# Patient Record
Sex: MALE | Race: WHITE | Employment: OTHER | URBAN - METROPOLITAN AREA
[De-identification: names, ages, dates, MRNs, and addresses within clinical notes are randomized per-mention and may not be internally consistent; named-entity substitution may affect disease eponyms.]

---

## 2018-12-21 ENCOUNTER — APPOINTMENT (OUTPATIENT)
Dept: CT IMAGING | Facility: CLINIC | Age: 83
DRG: 057 | End: 2018-12-21
Attending: INTERNAL MEDICINE
Payer: MEDICARE

## 2018-12-21 ENCOUNTER — APPOINTMENT (OUTPATIENT)
Dept: GENERAL RADIOLOGY | Facility: CLINIC | Age: 83
DRG: 057 | End: 2018-12-21
Attending: EMERGENCY MEDICINE
Payer: MEDICARE

## 2018-12-21 ENCOUNTER — HOSPITAL ENCOUNTER (INPATIENT)
Facility: CLINIC | Age: 83
LOS: 7 days | Discharge: NURSING, REHAB OR LONG-TERM CARE | DRG: 057 | End: 2018-12-28
Attending: EMERGENCY MEDICINE | Admitting: INTERNAL MEDICINE
Payer: MEDICARE

## 2018-12-21 DIAGNOSIS — F03.918 SENILE DEMENTIA WITH BEHAVIORAL DISTURBANCE (H): Primary | ICD-10-CM

## 2018-12-21 DIAGNOSIS — I48.91 ATRIAL FIBRILLATION WITH RVR (H): ICD-10-CM

## 2018-12-21 LAB
ALBUMIN UR-MCNC: NEGATIVE MG/DL
ANION GAP SERPL CALCULATED.3IONS-SCNC: 8 MMOL/L (ref 3–14)
APPEARANCE UR: CLEAR
BASOPHILS # BLD AUTO: 0 10E9/L (ref 0–0.2)
BASOPHILS NFR BLD AUTO: 0.9 %
BILIRUB UR QL STRIP: NEGATIVE
BUN SERPL-MCNC: 15 MG/DL (ref 7–30)
CALCIUM SERPL-MCNC: 9.2 MG/DL (ref 8.5–10.1)
CHLORIDE SERPL-SCNC: 102 MMOL/L (ref 94–109)
CO2 SERPL-SCNC: 28 MMOL/L (ref 20–32)
COLOR UR AUTO: YELLOW
CREAT SERPL-MCNC: 1.14 MG/DL (ref 0.66–1.25)
DIFFERENTIAL METHOD BLD: ABNORMAL
EOSINOPHIL # BLD AUTO: 0.1 10E9/L (ref 0–0.7)
EOSINOPHIL NFR BLD AUTO: 1.6 %
ERYTHROCYTE [DISTWIDTH] IN BLOOD BY AUTOMATED COUNT: 13.4 % (ref 10–15)
GFR SERPL CREATININE-BSD FRML MDRD: 60 ML/MIN/{1.73_M2}
GLUCOSE SERPL-MCNC: 111 MG/DL (ref 70–99)
GLUCOSE UR STRIP-MCNC: NEGATIVE MG/DL
HCT VFR BLD AUTO: 40.1 % (ref 40–53)
HGB BLD-MCNC: 13.4 G/DL (ref 13.3–17.7)
HGB UR QL STRIP: NEGATIVE
IMM GRANULOCYTES # BLD: 0 10E9/L (ref 0–0.4)
IMM GRANULOCYTES NFR BLD: 0 %
INTERPRETATION ECG - MUSE: NORMAL
KETONES UR STRIP-MCNC: NEGATIVE MG/DL
LACTATE BLD-SCNC: 1.1 MMOL/L (ref 0.7–2)
LEUKOCYTE ESTERASE UR QL STRIP: NEGATIVE
LYMPHOCYTES # BLD AUTO: 1.1 10E9/L (ref 0.8–5.3)
LYMPHOCYTES NFR BLD AUTO: 25.1 %
MAGNESIUM SERPL-MCNC: 2.3 MG/DL (ref 1.6–2.3)
MCH RBC QN AUTO: 32.6 PG (ref 26.5–33)
MCHC RBC AUTO-ENTMCNC: 33.4 G/DL (ref 31.5–36.5)
MCV RBC AUTO: 98 FL (ref 78–100)
MONOCYTES # BLD AUTO: 0.4 10E9/L (ref 0–1.3)
MONOCYTES NFR BLD AUTO: 9.4 %
NEUTROPHILS # BLD AUTO: 2.8 10E9/L (ref 1.6–8.3)
NEUTROPHILS NFR BLD AUTO: 63 %
NITRATE UR QL: NEGATIVE
NRBC # BLD AUTO: 0 10*3/UL
NRBC BLD AUTO-RTO: 0 /100
NT-PROBNP SERPL-MCNC: 2345 PG/ML (ref 0–1800)
PH UR STRIP: 7 PH (ref 5–7)
PLATELET # BLD AUTO: 233 10E9/L (ref 150–450)
POTASSIUM SERPL-SCNC: 4 MMOL/L (ref 3.4–5.3)
RBC # BLD AUTO: 4.11 10E12/L (ref 4.4–5.9)
RBC #/AREA URNS AUTO: <1 /HPF (ref 0–2)
SODIUM SERPL-SCNC: 138 MMOL/L (ref 133–144)
SOURCE: NORMAL
SP GR UR STRIP: 1.01 (ref 1–1.03)
TROPONIN I SERPL-MCNC: 0.01 UG/L (ref 0–0.04)
TROPONIN I SERPL-MCNC: 0.02 UG/L (ref 0–0.04)
TROPONIN I SERPL-MCNC: 0.02 UG/L (ref 0–0.04)
TSH SERPL DL<=0.005 MIU/L-ACNC: 2.78 MU/L (ref 0.4–4)
UROBILINOGEN UR STRIP-MCNC: NORMAL MG/DL (ref 0–2)
WBC # BLD AUTO: 4.4 10E9/L (ref 4–11)
WBC #/AREA URNS AUTO: <1 /HPF (ref 0–5)

## 2018-12-21 PROCEDURE — 25000125 ZZHC RX 250: Performed by: EMERGENCY MEDICINE

## 2018-12-21 PROCEDURE — 25000132 ZZH RX MED GY IP 250 OP 250 PS 637: Mod: GY | Performed by: INTERNAL MEDICINE

## 2018-12-21 PROCEDURE — 70450 CT HEAD/BRAIN W/O DYE: CPT

## 2018-12-21 PROCEDURE — 25000128 H RX IP 250 OP 636: Performed by: EMERGENCY MEDICINE

## 2018-12-21 PROCEDURE — 85025 COMPLETE CBC W/AUTO DIFF WBC: CPT | Performed by: EMERGENCY MEDICINE

## 2018-12-21 PROCEDURE — 71046 X-RAY EXAM CHEST 2 VIEWS: CPT

## 2018-12-21 PROCEDURE — 83735 ASSAY OF MAGNESIUM: CPT | Performed by: PHYSICIAN ASSISTANT

## 2018-12-21 PROCEDURE — 21000001 ZZH R&B HEART CARE

## 2018-12-21 PROCEDURE — 84443 ASSAY THYROID STIM HORMONE: CPT | Performed by: PHYSICIAN ASSISTANT

## 2018-12-21 PROCEDURE — 83880 ASSAY OF NATRIURETIC PEPTIDE: CPT | Performed by: EMERGENCY MEDICINE

## 2018-12-21 PROCEDURE — 83605 ASSAY OF LACTIC ACID: CPT | Performed by: PHYSICIAN ASSISTANT

## 2018-12-21 PROCEDURE — 80048 BASIC METABOLIC PNL TOTAL CA: CPT | Performed by: EMERGENCY MEDICINE

## 2018-12-21 PROCEDURE — A9270 NON-COVERED ITEM OR SERVICE: HCPCS | Mod: GY | Performed by: INTERNAL MEDICINE

## 2018-12-21 PROCEDURE — 99285 EMERGENCY DEPT VISIT HI MDM: CPT | Mod: 25

## 2018-12-21 PROCEDURE — 93005 ELECTROCARDIOGRAM TRACING: CPT

## 2018-12-21 PROCEDURE — 84484 ASSAY OF TROPONIN QUANT: CPT | Performed by: PHYSICIAN ASSISTANT

## 2018-12-21 PROCEDURE — 96375 TX/PRO/DX INJ NEW DRUG ADDON: CPT

## 2018-12-21 PROCEDURE — 84484 ASSAY OF TROPONIN QUANT: CPT | Performed by: EMERGENCY MEDICINE

## 2018-12-21 PROCEDURE — 36415 COLL VENOUS BLD VENIPUNCTURE: CPT | Performed by: PHYSICIAN ASSISTANT

## 2018-12-21 PROCEDURE — 96374 THER/PROPH/DIAG INJ IV PUSH: CPT

## 2018-12-21 PROCEDURE — 99223 1ST HOSP IP/OBS HIGH 75: CPT | Mod: AI | Performed by: INTERNAL MEDICINE

## 2018-12-21 PROCEDURE — 81001 URINALYSIS AUTO W/SCOPE: CPT | Performed by: EMERGENCY MEDICINE

## 2018-12-21 PROCEDURE — 25000125 ZZHC RX 250: Performed by: INTERNAL MEDICINE

## 2018-12-21 RX ORDER — METOPROLOL TARTRATE 25 MG/1
25 TABLET, FILM COATED ORAL 2 TIMES DAILY
Status: DISCONTINUED | OUTPATIENT
Start: 2018-12-21 | End: 2018-12-22

## 2018-12-21 RX ORDER — NALOXONE HYDROCHLORIDE 0.4 MG/ML
.1-.4 INJECTION, SOLUTION INTRAMUSCULAR; INTRAVENOUS; SUBCUTANEOUS
Status: DISCONTINUED | OUTPATIENT
Start: 2018-12-21 | End: 2018-12-28 | Stop reason: HOSPADM

## 2018-12-21 RX ORDER — METOPROLOL TARTRATE 1 MG/ML
5-15 INJECTION, SOLUTION INTRAVENOUS EVERY 6 HOURS PRN
Status: DISCONTINUED | OUTPATIENT
Start: 2018-12-21 | End: 2018-12-28 | Stop reason: HOSPADM

## 2018-12-21 RX ORDER — METOPROLOL TARTRATE 25 MG/1
25 TABLET, FILM COATED ORAL 2 TIMES DAILY
Status: DISCONTINUED | OUTPATIENT
Start: 2018-12-21 | End: 2018-12-21

## 2018-12-21 RX ORDER — ACETAMINOPHEN 325 MG/1
650 TABLET ORAL EVERY 4 HOURS PRN
Status: DISCONTINUED | OUTPATIENT
Start: 2018-12-21 | End: 2018-12-28 | Stop reason: HOSPADM

## 2018-12-21 RX ORDER — DILTIAZEM HYDROCHLORIDE 5 MG/ML
15 INJECTION INTRAVENOUS ONCE
Status: COMPLETED | OUTPATIENT
Start: 2018-12-21 | End: 2018-12-21

## 2018-12-21 RX ORDER — QUETIAPINE FUMARATE 25 MG/1
25 TABLET, FILM COATED ORAL 3 TIMES DAILY PRN
Status: DISCONTINUED | OUTPATIENT
Start: 2018-12-21 | End: 2018-12-22

## 2018-12-21 RX ORDER — QUETIAPINE FUMARATE 25 MG/1
25 TABLET, FILM COATED ORAL 2 TIMES DAILY
Status: DISCONTINUED | OUTPATIENT
Start: 2018-12-21 | End: 2018-12-21

## 2018-12-21 RX ORDER — PROCHLORPERAZINE 25 MG
12.5 SUPPOSITORY, RECTAL RECTAL EVERY 12 HOURS PRN
Status: DISCONTINUED | OUTPATIENT
Start: 2018-12-21 | End: 2018-12-28 | Stop reason: HOSPADM

## 2018-12-21 RX ORDER — SIMETHICONE 80 MG
80 TABLET,CHEWABLE ORAL PRN
COMMUNITY
End: 2019-02-04

## 2018-12-21 RX ORDER — FUROSEMIDE 20 MG
20 TABLET ORAL DAILY
Status: DISCONTINUED | OUTPATIENT
Start: 2018-12-21 | End: 2018-12-23

## 2018-12-21 RX ORDER — PROCHLORPERAZINE MALEATE 5 MG
5 TABLET ORAL EVERY 6 HOURS PRN
Status: DISCONTINUED | OUTPATIENT
Start: 2018-12-21 | End: 2018-12-28 | Stop reason: HOSPADM

## 2018-12-21 RX ORDER — LORAZEPAM 2 MG/ML
1 INJECTION INTRAMUSCULAR ONCE
Status: COMPLETED | OUTPATIENT
Start: 2018-12-21 | End: 2018-12-21

## 2018-12-21 RX ORDER — ACETAMINOPHEN 650 MG/1
650 SUPPOSITORY RECTAL EVERY 4 HOURS PRN
Status: DISCONTINUED | OUTPATIENT
Start: 2018-12-21 | End: 2018-12-28 | Stop reason: HOSPADM

## 2018-12-21 RX ADMIN — FUROSEMIDE 20 MG: 20 TABLET ORAL at 18:09

## 2018-12-21 RX ADMIN — METOPROLOL TARTRATE 5 MG: 5 INJECTION, SOLUTION INTRAVENOUS at 23:40

## 2018-12-21 RX ADMIN — QUETIAPINE 25 MG: 25 TABLET ORAL at 19:01

## 2018-12-21 RX ADMIN — LORAZEPAM 1 MG: 2 INJECTION INTRAMUSCULAR; INTRAVENOUS at 13:55

## 2018-12-21 RX ADMIN — METOPROLOL TARTRATE 25 MG: 25 TABLET ORAL at 18:09

## 2018-12-21 RX ADMIN — DILTIAZEM HYDROCHLORIDE 15 MG: 5 INJECTION INTRAVENOUS at 11:33

## 2018-12-21 RX ADMIN — DILTIAZEM HYDROCHLORIDE 10 MG/HR: 5 INJECTION INTRAVENOUS at 11:56

## 2018-12-21 SDOH — HEALTH STABILITY: MENTAL HEALTH: HOW OFTEN DO YOU HAVE A DRINK CONTAINING ALCOHOL?: NEVER

## 2018-12-21 ASSESSMENT — ENCOUNTER SYMPTOMS
CONFUSION: 1
ARTHRALGIAS: 0
FREQUENCY: 1
NAUSEA: 0
NUMBNESS: 0

## 2018-12-21 ASSESSMENT — MIFFLIN-ST. JEOR: SCORE: 1417.95

## 2018-12-21 ASSESSMENT — ACTIVITIES OF DAILY LIVING (ADL): ADLS_ACUITY_SCORE: 15

## 2018-12-21 NOTE — ED PROVIDER NOTES
History     Chief Complaint:  Urinary Frequency    HPI   The patient's history was somewhat limited secondary to a confusion and memory issues. The patient's daughter provided much of the history.    Paul Milligan is a 83 year old male who presents to the ED for evaluation of urinary frequency. The patient's daughter reports that the patient does not have any formal diagnosis of dementia, though has had waxing and waning cognitive issues since March of 2018. Over the past 3 days, the patient has had increased confusion from his baseline, in addition to some urinary frequency. This concerned his daughter about a possible UTI, so they presented to the ED for evaluation. Here in the ED, the patient states he has no complaints. He denies any nausea, chest pain, visual changes, numbness, or other symptoms. His daughter adds that he has had no known falls recently, and the patient denies any pain. Of note, the patient's daughter suspects that he possibly remotely had an episode of atrial fibrillation, though he has had nothing that is long standing. He is not on any medications besides a melatonin at night.    Allergies:  NKDA    Medications:    Melatonin    Past Medical History:    The patient denies any significant past medical history.    Past Surgical History:    The patient does not have any pertinent past surgical history.    Family History:    No past pertinent family history.    Social History:  Marital Status:   [2]  Negative for tobacco use.  Presents with daughter    Review of Systems   Unable to perform ROS: Mental status change   Eyes: Negative for visual disturbance.   Cardiovascular: Negative for chest pain.   Gastrointestinal: Negative for nausea.   Genitourinary: Positive for frequency.   Musculoskeletal: Negative for arthralgias.   Neurological: Negative for numbness.   Psychiatric/Behavioral: Positive for confusion.     Physical Exam     Patient Vitals for the past 24 hrs:   BP Temp Temp src  "Pulse Heart Rate Resp SpO2 Height   12/21/18 1445 -- -- -- -- 102 10 -- --   12/21/18 1430 (!) 131/114 -- -- 130 -- -- -- --   12/21/18 1400 (!) 146/102 -- -- 111 116 9 98 % --   12/21/18 1330 (!) 139/116 -- -- 114 83 18 99 % --   12/21/18 1315 -- -- -- -- 96 (!) 6 98 % --   12/21/18 1310 -- -- -- -- 108 (!) 7 96 % --   12/21/18 1300 (!) 128/94 -- -- 99 92 (!) 36 98 % --   12/21/18 1235 -- -- -- -- 105 18 97 % --   12/21/18 1230 (!) 127/100 -- -- 105 105 11 94 % --   12/21/18 1200 (!) 142/116 -- -- 130 118 (!) 31 (!) 79 % --   12/21/18 1044 -- -- -- -- -- -- 94 % --   12/21/18 1041 (!) 158/120 -- -- 135 -- -- -- --   12/21/18 1036 -- 97.7  F (36.5  C) Oral 152 -- 16 98 % 1.88 m (6' 2\")     Physical Exam  SKIN:  Warm, dry.  HEMATOLOGIC/IMMUNOLOGIC/LYMPHATIC:  No pallor.  No lower limb edema.  HENT:  No JVD.  EYES:  Conjunctivae normal.  CARDIOVASCULAR:  Tachycardic rate with irregularly irregular rhythm.  No murmur.  RESPIRATORY:  No respiratory distress, breath sounds equal and normal.  GASTROINTESTINAL:  Soft, nontender abdomen.  No distension.  MUSCULOSKELETAL:  Normal body habitus.  NEUROLOGIC:  Alert, conversant.  Memory impairment.  No gross motor or sensory deficit.    PSYCHIATRIC:  Labile mood, at times agitated.    Emergency Department Course   ECG:  Indication: Confusion  Time: 1049  Vent. Rate 142 bpm. CO interval *. QRS duration 138. QT/QTc 338/519. P-R-T axis * 101 -23.  Atrial fibrillation with RVR. RBBB. Abnormal ECG. Read time: 1050    Imaging:  Radiographic findings were communicated with the patient and family who voiced understanding of the findings.  XR Chest 2 views:   Heart size is normal. Small right pleural effusion. No pneumothorax or abnormal area of consolidation. Minimal right basilar atelectasis, as per radiology.     Laboratory:  CBC: WBC: 4.4, HGB: 13.4, PLT: 233  BMP: Glucose 111 (H), GFR 60 (L), o/w WNL (Creatinine: 1.14)    1050 Troponin: 0.016    BNP: 2345 (H)    UA with " Microscopic: ordered, pending on admission    Interventions:  1133 Diltiazem 15 mg IV  1156 Diltiazem 10 mg/hr IV infusion  1355 Lorazepam, 1 mg, IV injection  Please see MAR for full list of medications administered in the ED.    Emergency Department Course:  Nursing notes and vitals reviewed. (1050) I performed an exam of the patient as documented above.     IV inserted. Medicine administered as documented above. Blood drawn. This was sent to the lab for further testing, results above.    The patient was sent for a Chest XR while in the emergency department, findings above.     EKG obtained in the ED, see results above.     (1155) I rechecked the patient and discussed the results of his workup thus far.     (1218)  I consulted with Dr. Herrera of the hospitalist services. They are in agreement to accept the patient for admission.    (1350) I reevaluated the patient and provided an update in regards to his ED course. The patient was voicing concerns that he did not want to stay, but did not fully understand why he was being admitted with his current mental status. The patient's family is still on board with the plan for admission.    Findings and plan explained to the Patient and daughter who consents to admission. Discussed the patient with Dr. Herrera, who will admit the patient to a medical bed for further monitoring, evaluation, and treatment.    Impression & Plan      Medical Decision Making:  Paul Milligan is a 83 year old male who presents with increasing confusion and evaluation revealed atrial fibrillation with rapid ventricular response.  That could cause confusion.  Testing otherwise relatively reassuring. Urinalysis is pending.  The patient will be admitted..    Diagnosis:    ICD-10-CM    1. Atrial fibrillation with RVR (H) I48.91      Disposition:  Admitted to Dr. Herrera    [place] Disclosure:  Jocy CASAS, am serving as a scribe on 12/21/2018 at 10:43 AM to personally document services  performed by Tai Womack MD based on my observations and the provider's statements to me.     Jocy Adames  12/21/2018    EMERGENCY DEPARTMENT       Tai Womack MD  12/22/18 1034

## 2018-12-21 NOTE — H&P
"Admitted:     12/21/2018      PRIMARY CARE PROVIDER:  Patient does not have a primary care provider.      CHIEF COMPLAINT:  Altered mental status.      HISTORY OF PRESENT ILLNESS:  Paul Milligan is an 83-year-old male with past medical history of remote atrial fibrillation, presumed paroxysmal, who presented to the Emergency Department accompanied by daughter for evaluation.  History was largely obtained via daughter as patient is cognitively impaired.  Daughter reports that over the past 3 days, the patient has had worsening confusion and agitation as well as urinary frequency and urgency, resulting in multiple sleepless nights.  Daughter notes that patient does not routinely follow with a provider and has likely not seen a physician in 10 years or more.  She does report that she seems to recall him having a history of atrial fibrillation, although does not remember the extent of this.  She gives a history of patient having cognitive concerns with memory loss since approximately March of this year.  She states that he has had a progressive decline in his cognition, however, remains able to care for himself and continues to reside at home with his wife, although she does note that he no longer drives long distances and typically sticks to areas that he is very familiar with.  She reports that over the last 6-7 months, there have been intermittent overnights which are described as \"bad nights.\"  She talks with her mother on a regular basis and states that when her father has a bad night, he will be extremely confused and at times he does not recognize his own wife.  He does not know where he is and becomes quite agitated with minimal ability to redirect.  She does note that he has historically taken the keys and left the home in the family vehicle, only to return a few hours later without anyone knowing where he goes.  She reports that he and his wife typically take more of a holistic approach to medications and " medicines and, therefore, they are on quite a few supplements.  She has noted some sleep disregulation, particularly over the last 3-4 months for which they have started melatonin which appears to be helpful; however, beginning Monday, 4 days prior to evaluation, the patient had significant changes overnight with severe confusion and inability to redirect.  She has thus spent the night at her parents' home in order to assist her mother.  She reports that patient has been up and ambulatory to the bathroom multiple times overnight which has resulted in lack of sleep, both for him as well as daughter.  She also reports that patient has not recognized her or her mother and is increasingly confused in the overnights.  This morning she called the nurse line for their insurance provider and, after having a conversation, she was directed to the Emergency Department to evaluate for possible underlying infectious or metabolic etiology of patient's acute worsening.      On arrival in the emergency department, the patient was seen by Dr. Womack.  Vitals were performed and were significant for hypertension as well as a heart rate of 152.  EKG was performed and confirmed atrial fibrillation with RVR.  Chest x-ray was obtained and indicated small right pleural effusion without area of consolidation.  Laboratory studies included a CBC and BMP which were overall unremarkable.  Troponin was negative.  BNP was elevated at 2345. The patient clinically appeared euvolemic, however, given his significantly elevated heart rate, he was given diltiazem bolus and started on a diltiazem infusion with improvement in rates to the 110s-120s.  The patient has remained asymptomatic while being in the Emergency Department.  Given atrial fibrillation with RVR and a possible underlying UTI with worsening cognitive impairments, request was made to admit to inpatient status under the Hospitalist Service.      The patient presently is evaluated in the  hospital room with daughter at bedside who provides majority of history as patient does not recall the events of the past 24-48 hours.  Daughter reports that he has not had any fevers, chills, cough, difficulty breathing, chest pain, shortness of breath.  She notes that he will intermittently have elevated heart rate over the past 6-7 months when taking pulse and blood pressure at the local grocery store.  He has been able to get up and walk 2-3 miles per day, although she does note that at times he will experience increasing shortness of breath which appears to pass.  She is highly concerned regarding his cognitive status; however, she does admit that it has been in progressive decline since approximately March of this year.      PAST MEDICAL HISTORY:  Remote history of what sounds like paroxysmal atrial fibrillation.  His daughter reports intermittent elevations in heart rate and irregularity.  Otherwise, no known medical history.      SURGICAL HISTORY:  Right shoulder surgery.      PRIOR TO ADMISSION MEDICATIONS:  None.  The patient takes multiple supplements.      ALLERGIES:  NO KNOWN DRUG ALLERGIES.      FAMILY HISTORY:  Reviewed, significant for cognitive impairment in mother.      SOCIAL HISTORY:  The patient currently lives in [place] with his wife.  He is presently able to perform all ADLs and work around the home.  He is a nonsmoker, nondrinker.      REVIEW OF SYSTEMS:  A 10-point review of systems was performed and is otherwise negative.  Please refer to the HPI.      PHYSICAL EXAMINATION:   VITAL SIGNS:  Temperature of 97.7, heart rate of 130, respiratory rate 10, blood pressure 131/114, SpO2 of 98% on room air.   GENERAL:  Well-developed, thin, elderly male who appears comfortable and stated age.   HEENT:  Head is normocephalic.  EOMs are intact bilaterally.  Conjunctivae and sclerae are clear, not injected bilaterally.  Nose, mouth patent.  Mucous membranes are moist.   LUNGS:  Clear to  auscultation bilaterally without wheezes or crackles.   CARDIOVASCULAR:  Tachycardic, irregularly irregular, normal S1 and S2, no murmur appreciated.   ABDOMEN:  Soft, nontender, nondistended, positive bowel sounds, located diffusely.     NEUROLOGIC:   The patient is awake and alert.  He is oriented only to self.  When asked what year it is, he reports that one can look at the calendar.  When asked who the president is, he says Mark.  He appears to believe it is 1950 or 1960.  He is able to follow commands.   strength is 5/5 bilaterally, plantar flexion and dorsiflexion are 5/5 bilaterally.   SKIN:  Warm and dry, no rash.  There is no pedal edema.      LABORATORY AND IMAGING:  As reviewed in Epic and as noted in HPI.      ASSESSMENT AND PLAN:  Paul Milligan is an 83-year-old male with past medical history of remote paroxysmal atrial fibrillation and subjective progressive cognitive decline as reported by daughter since March 2018.  He presented to the Emergency Department for evaluation of 3-day history of acute worsening confusion with associated tachycardia and identified to be in atrial fibrillation with RVR with possible underlying UTI.   1.  Atrial fibrillation with rapid ventricular response.  The patient has been initiated on a diltiazem drip with improvement in rates, however, are persistently elevated.  He is asymptomatic without chest pain, chest pressure, shortness of breath.  The patient's daughter does report that he has a history of what sounds like paroxysmal atrial fibrillation.  He does not follow with a physician on a regular basis.  At this time, we will obtain an echocardiogram.  He will continue on diltiazem drip.  We will start on metoprolol b.i.d. and monitor on telemetry.  He will have electrolytes as well as thyroid functions evaluated.  All of his prior to admission herbal supplements will be held at this time.  Additionally, he will be evaluated for possible underlying infection as  noted below.  Patient with elevated proBNP in the absence of lower extremity pedal edema, however, does have evidence of slight volume overload on chest x-ray.  At this time, we will continue to monitor clinically and attempt to achieve rate control with a beta blocker and diltiazem as noted above.  We will monitor closely with strict I's and O's and daily weights and initiate Lasix therapy as needed.  Given concern for underlying congestive heart failure.   2.  Dysuria with urinary frequency and urgency.  We will obtain a urinalysis and urine culture  and treat empirically for infection if concerning.   3.  Altered mental status.  The patient without known history of dementia or cognitive impairment, although family reports a progressive decline over the past 6-7 months.  In the setting of patient's underlying paroxysmal atrial fibrillation, now in atrial fibrillation with RVR, it certainly is possible for patient to have sustained a cerebrovascular accident in the past, contributing to cognitive deficits, although there is also strong family history of dementia.  Patient has never been formally evaluated in regard to his cognitive status.  At this time, we will obtain a CT head to rule out alternative etiology, although I strongly suspect that dementia is playing a role.  The patient's QTc is notably 512 on EKG, therefore, would exercise caution in using antipsychotics in this patient; however, given 3-day history of significant confusion overnight, I anticipate the risks may outweigh the benefits and therefore p.r.n. Seroquel has been ordered.  The patient will need to be monitored extremely closely on telemetry should this medication be utilized.   4.  Deep venous thrombosis prophylaxis:  PCDs.     5.  CODE STATUS:  The patient is full code.      This patient was staffed with Dr. Parul Herrera, who independently interviewed and evaluated patient and is in agreement with the above-mentioned plan.         PARUL PACKER  MD LORENZO       As dictated by BROOKLYNN RED PA-C            D: 2018   T: 2018   MT: ISAMAR      Name:     MARCE BOO   MRN:      [id number]        Account:      [id number]   :      1935        Admitted:     2018                   Document: [id number]

## 2018-12-21 NOTE — PROGRESS NOTES
I independently examined patient, reviewed chart and discussed the assessment and plan with Pablo Maxwell PA-C and I agree with her assessment and plan. I discussed our evaluation and plan of care with patient as well.     Paul Milligan is a 83 year old with remove history of PAF and progressive memory loss and increasing confusing since March which became acutely worse in the last couple of days. He also was having urinary symptoms.     He as afebrile, not septic appearing, nl WBC. In Afib with RVR. Nl WBC. CXR reviewed shows R effusion with some patchy infiltrate, possible edema. BNP elevated at 2345.     UA ordered and pending given symptoms, but low suspicion for significant infection.   Started lasix 20 mg dialy for gentle diuresis   For HR control, start metoprolol 25 mg BID with IV metoprolol PRN  Attempt to stop drip if HR remains under 100. Discussed with RN.  Agree with CT to evaluate for stroke/bleed, if negative will proceed to MRI tomorrow.   Seroquel ordered TID PRN for agitation, discussed with RN.     Miladis Herrera MD

## 2018-12-21 NOTE — PHARMACY-ADMISSION MEDICATION HISTORY
Admission medication history interview status for the 12/21/2018  admission is complete. See EPIC admission navigator for prior to admission medications     Medication history source reliability:Good    Actions taken by pharmacist (provider contacted, etc):Interviewed pts daughter     Additional medication history information not noted on PTA med list :Pt takes a daily aspirin and multiple supplements - pts daughter had list of supplements but no doses noted    Medication reconciliation/reorder completed by provider prior to medication history? No    Time spent in this activity: 15 mintues    Prior to Admission medications    Medication Sig Last Dose Taking? Auth Provider   Ascorbic Acid (VITAMIN C PO) Take by mouth daily  Yes Unknown, Entered By History   aspirin (ASA) 325 MG EC tablet Take 325 mg by mouth every 6 hours as needed for moderate pain 12/20/2018 at Unknown time Yes Reported, Patient   aspirin (ASA) 325 MG EC tablet Take 325 mg by mouth daily 12/21/2018 at Unknown time Yes Unknown, Entered By History   CALCIUM CARBONATE PO Take by mouth daily  Yes Unknown, Entered By History   Cholecalciferol (VITAMIN D-3 PO) Take by mouth At Bedtime  Yes Unknown, Entered By History   COENZYME Q10 PO Take by mouth daily  Yes Unknown, Entered By History   Digestive Enzymes (PAPAYA ENZYME PO) Take by mouth daily  Yes Unknown, Entered By History   MAGNESIUM SULFATE PO Take by mouth 2 times daily  Yes Unknown, Entered By History   MELATONIN PO Take by mouth At Bedtime  Yes Unknown, Entered By History   Omega-3 Fatty Acids (FISH OIL PO) Take by mouth daily  Yes Unknown, Entered By History   Saw Palmetto, Serenoa repens, (SAW PALMETTO PO) Take by mouth daily  Yes Unknown, Entered By History   simethicone (GAS-X) 80 MG chewable tablet Take 80 mg by mouth as needed for flatulence or cramping  Yes Unknown, Entered By History   VALERIAN ROOT PO Take by mouth At Bedtime  Yes Unknown, Entered By History   vitamin B complex with  vitamin C (VITAMIN  B COMPLEX) tablet Take 1 tablet by mouth daily  Yes Unknown, Entered By History   VITAMIN E PO Take by mouth daily  Yes Unknown, Entered By History   ZINC SULFATE PO Take by mouth daily  Yes Unknown, Entered By History

## 2018-12-21 NOTE — ED NOTES
"[facility]  ED Nurse Handoff Report    ED Chief complaint: Urinary Frequency (3 days of urinary frequency - daughter concern for UTI)      ED Diagnosis:   Final diagnoses:   Atrial flutter with rapid ventricular response (H)       Code Status: Full Code    Allergies: No Known Allergies    Activity level - Baseline/Home:  Stand with Assist    Activity Level - Current:   Stand with Assist     Needed?: No    Isolation: No  Infection: Not Applicable  Bariatric?: No    Vital Signs:   Vitals:    12/21/18 1044 12/21/18 1200 12/21/18 1230 12/21/18 1235   BP:  (!) 142/116 (!) 127/100    Pulse:  130 105    Resp:  (!) 31 11 18   Temp:       TempSrc:       SpO2: 94% (!) 79% 94% 97%   Height:           Cardiac Rhythm: ,   Cardiac  Cardiac Rhythm: Atrial fibrillation    Pain level:      Is this patient confused?: Yes   Does this patient have a guardian?  No         If yes, is there guardianship documents in the Epic \"Code/ACP\" activity?  No         Guardian Notified?  No  [place] - Suicide Severity Rating Scale Completed?  Yes  If yes, what color did the patient score?  White    Patient Report: Initial Complaint:   83 year old male who presents to the ED for evaluation of urinary frequency. The patient's daughter reports that the patient does not have any formal diagnosis of dementia, though has had waxing and waning cognitive issues since March of 2018. Over the past 3 days, the patient has had increased confusion from his baseline, in addition to some urinary frequency. This concerned his daughter about a possible UTI, so they presented to the ED for evaluation. Here in the ED, the patient states he has no complaints. He denies any nausea, chest pain, visual changes, numbness, or other symptoms. His daughter adds that he has had no known falls recently, and the patient denies any pain. Of note, the patient's daughter suspects that he possibly remotely had an episode of atrial fibrillation, though he " has had nothing that is long standing. He is not on any medications besides a melatonin at night.     Focused Assessment:   Alert - confused to time  Denies any pain or SOB  Lung sounds clear   Atrial fib.     Tests Performed:   Abnormal Results:   BNP 2345    Treatments provided:   Cardezem 15mg bolus  Cardezem 10mg/hour    Family Comments:   daughter    OBS brochure/video discussed/provided to patient/family: No              Name of person given brochure if not patient:                 Relationship to patient:       ED Medications:   Medications   diltiazem (CARDIZEM) 125 mg in sodium chloride 0.9 % 125 mL infusion (10 mg/hr Intravenous New Bag 12/21/18 1156)   diltiazem (CARDIZEM) injection 15 mg (15 mg Intravenous Given 12/21/18 1133)       Drips infusing?:  Yes    For the majority of the shift this patient was Green.   Interventions performed were none.    Severe Sepsis OR Septic Shock Diagnosis Present: No    To be done/followed up on inpatient unit:        ED NURSE PHONE NUMBER:   [phone]

## 2018-12-21 NOTE — PROGRESS NOTES
RECEIVING UNIT ED HANDOFF REVIEW    ED Nurse Handoff Report was reviewed by: Maude Enamorado on December 21, 2018 at 1:29 PM

## 2018-12-22 ENCOUNTER — APPOINTMENT (OUTPATIENT)
Dept: MRI IMAGING | Facility: CLINIC | Age: 83
DRG: 057 | End: 2018-12-22
Attending: INTERNAL MEDICINE
Payer: MEDICARE

## 2018-12-22 LAB
ANION GAP SERPL CALCULATED.3IONS-SCNC: 9 MMOL/L (ref 3–14)
BUN SERPL-MCNC: 14 MG/DL (ref 7–30)
CALCIUM SERPL-MCNC: 8.6 MG/DL (ref 8.5–10.1)
CHLORIDE SERPL-SCNC: 104 MMOL/L (ref 94–109)
CO2 SERPL-SCNC: 25 MMOL/L (ref 20–32)
CREAT SERPL-MCNC: 1.1 MG/DL (ref 0.66–1.25)
GFR SERPL CREATININE-BSD FRML MDRD: 62 ML/MIN/{1.73_M2}
GLUCOSE SERPL-MCNC: 78 MG/DL (ref 70–99)
MAGNESIUM SERPL-MCNC: 2.2 MG/DL (ref 1.6–2.3)
POTASSIUM SERPL-SCNC: 3.4 MMOL/L (ref 3.4–5.3)
SODIUM SERPL-SCNC: 138 MMOL/L (ref 133–144)

## 2018-12-22 PROCEDURE — A9270 NON-COVERED ITEM OR SERVICE: HCPCS | Mod: GY | Performed by: INTERNAL MEDICINE

## 2018-12-22 PROCEDURE — 25000125 ZZHC RX 250: Performed by: INTERNAL MEDICINE

## 2018-12-22 PROCEDURE — 70553 MRI BRAIN STEM W/O & W/DYE: CPT

## 2018-12-22 PROCEDURE — 25000132 ZZH RX MED GY IP 250 OP 250 PS 637: Mod: GY | Performed by: INTERNAL MEDICINE

## 2018-12-22 PROCEDURE — 83735 ASSAY OF MAGNESIUM: CPT | Performed by: INTERNAL MEDICINE

## 2018-12-22 PROCEDURE — 21000001 ZZH R&B HEART CARE

## 2018-12-22 PROCEDURE — 25500064 ZZH RX 255 OP 636: Performed by: INTERNAL MEDICINE

## 2018-12-22 PROCEDURE — 25000132 ZZH RX MED GY IP 250 OP 250 PS 637: Mod: GY | Performed by: PHYSICIAN ASSISTANT

## 2018-12-22 PROCEDURE — 99233 SBSQ HOSP IP/OBS HIGH 50: CPT | Performed by: INTERNAL MEDICINE

## 2018-12-22 PROCEDURE — 80048 BASIC METABOLIC PNL TOTAL CA: CPT | Performed by: INTERNAL MEDICINE

## 2018-12-22 PROCEDURE — A9270 NON-COVERED ITEM OR SERVICE: HCPCS | Mod: GY | Performed by: PHYSICIAN ASSISTANT

## 2018-12-22 PROCEDURE — A9585 GADOBUTROL INJECTION: HCPCS | Performed by: INTERNAL MEDICINE

## 2018-12-22 PROCEDURE — 36415 COLL VENOUS BLD VENIPUNCTURE: CPT | Performed by: INTERNAL MEDICINE

## 2018-12-22 RX ORDER — POTASSIUM CHLORIDE 7.45 MG/ML
10 INJECTION INTRAVENOUS
Status: DISCONTINUED | OUTPATIENT
Start: 2018-12-22 | End: 2018-12-28 | Stop reason: HOSPADM

## 2018-12-22 RX ORDER — GADOBUTROL 604.72 MG/ML
6 INJECTION INTRAVENOUS ONCE
Status: COMPLETED | OUTPATIENT
Start: 2018-12-22 | End: 2018-12-22

## 2018-12-22 RX ORDER — QUETIAPINE FUMARATE 25 MG/1
25 TABLET, FILM COATED ORAL ONCE
Status: COMPLETED | OUTPATIENT
Start: 2018-12-22 | End: 2018-12-22

## 2018-12-22 RX ORDER — METOPROLOL TARTRATE 50 MG
50 TABLET ORAL 2 TIMES DAILY
Status: DISCONTINUED | OUTPATIENT
Start: 2018-12-22 | End: 2018-12-25

## 2018-12-22 RX ORDER — POTASSIUM CHLORIDE 29.8 MG/ML
20 INJECTION INTRAVENOUS
Status: DISCONTINUED | OUTPATIENT
Start: 2018-12-22 | End: 2018-12-28 | Stop reason: HOSPADM

## 2018-12-22 RX ORDER — MAGNESIUM SULFATE HEPTAHYDRATE 40 MG/ML
4 INJECTION, SOLUTION INTRAVENOUS EVERY 4 HOURS PRN
Status: DISCONTINUED | OUTPATIENT
Start: 2018-12-22 | End: 2018-12-28 | Stop reason: HOSPADM

## 2018-12-22 RX ORDER — POTASSIUM CHLORIDE 1500 MG/1
20-40 TABLET, EXTENDED RELEASE ORAL
Status: DISCONTINUED | OUTPATIENT
Start: 2018-12-22 | End: 2018-12-28 | Stop reason: HOSPADM

## 2018-12-22 RX ORDER — QUETIAPINE FUMARATE 25 MG/1
25 TABLET, FILM COATED ORAL AT BEDTIME
Status: DISCONTINUED | OUTPATIENT
Start: 2018-12-22 | End: 2018-12-24

## 2018-12-22 RX ORDER — POTASSIUM CHLORIDE 7.45 MG/ML
10 INJECTION INTRAVENOUS
Status: DISCONTINUED | OUTPATIENT
Start: 2018-12-22 | End: 2018-12-22

## 2018-12-22 RX ORDER — MAGNESIUM SULFATE HEPTAHYDRATE 40 MG/ML
2 INJECTION, SOLUTION INTRAVENOUS DAILY PRN
Status: DISCONTINUED | OUTPATIENT
Start: 2018-12-22 | End: 2018-12-28 | Stop reason: HOSPADM

## 2018-12-22 RX ORDER — POTASSIUM CL/LIDO/0.9 % NACL 10MEQ/0.1L
10 INTRAVENOUS SOLUTION, PIGGYBACK (ML) INTRAVENOUS
Status: DISCONTINUED | OUTPATIENT
Start: 2018-12-22 | End: 2018-12-28 | Stop reason: HOSPADM

## 2018-12-22 RX ORDER — POTASSIUM CHLORIDE 29.8 MG/ML
20 INJECTION INTRAVENOUS
Status: DISCONTINUED | OUTPATIENT
Start: 2018-12-22 | End: 2018-12-22

## 2018-12-22 RX ORDER — POTASSIUM CHLORIDE 1.5 G/1.58G
20-40 POWDER, FOR SOLUTION ORAL
Status: DISCONTINUED | OUTPATIENT
Start: 2018-12-22 | End: 2018-12-28 | Stop reason: HOSPADM

## 2018-12-22 RX ORDER — POTASSIUM CL/LIDO/0.9 % NACL 10MEQ/0.1L
10 INTRAVENOUS SOLUTION, PIGGYBACK (ML) INTRAVENOUS
Status: DISCONTINUED | OUTPATIENT
Start: 2018-12-22 | End: 2018-12-22

## 2018-12-22 RX ORDER — POTASSIUM CHLORIDE 1.5 G/1.58G
20-40 POWDER, FOR SOLUTION ORAL
Status: DISCONTINUED | OUTPATIENT
Start: 2018-12-22 | End: 2018-12-22

## 2018-12-22 RX ORDER — POTASSIUM CHLORIDE 1500 MG/1
20-40 TABLET, EXTENDED RELEASE ORAL
Status: DISCONTINUED | OUTPATIENT
Start: 2018-12-22 | End: 2018-12-22

## 2018-12-22 RX ADMIN — FUROSEMIDE 20 MG: 20 TABLET ORAL at 09:50

## 2018-12-22 RX ADMIN — METOPROLOL TARTRATE 50 MG: 50 TABLET, FILM COATED ORAL at 09:50

## 2018-12-22 RX ADMIN — METOPROLOL TARTRATE 50 MG: 50 TABLET, FILM COATED ORAL at 19:12

## 2018-12-22 RX ADMIN — Medication 12.5 MG: at 19:12

## 2018-12-22 RX ADMIN — Medication 1 MG: at 00:00

## 2018-12-22 RX ADMIN — POTASSIUM CHLORIDE 20 MEQ: 1500 TABLET, EXTENDED RELEASE ORAL at 15:18

## 2018-12-22 RX ADMIN — GADOBUTROL 6 ML: 604.72 INJECTION INTRAVENOUS at 13:58

## 2018-12-22 RX ADMIN — QUETIAPINE 25 MG: 25 TABLET ORAL at 12:00

## 2018-12-22 RX ADMIN — ASPIRIN 325 MG: 325 TABLET, DELAYED RELEASE ORAL at 09:50

## 2018-12-22 RX ADMIN — METOPROLOL TARTRATE 5 MG: 5 INJECTION, SOLUTION INTRAVENOUS at 08:27

## 2018-12-22 ASSESSMENT — ACTIVITIES OF DAILY LIVING (ADL)
ADLS_ACUITY_SCORE: 23

## 2018-12-22 NOTE — PLAN OF CARE
A&Ox1.  VSS.  Tele afib with CVR/RVR with activity.  Impulsive and restless, unsteady on feet.  Sitter at bedside.  Up with 2 to edge of bed to use urinal, incontinent at times.  CT head done.  Echo tomorrow and possibly MRI.

## 2018-12-22 NOTE — PLAN OF CARE
"Pt is oriented to self. Forgetful. Sometimes redirectable. Up ao1-2, unsteady gait. Daughter Velma, resourceful and primary caretaker with pt's wife. +CMS. LS clear. Diltiazem gtt discontinued at 1730, PO metropolol dose given  at 1810. HR below 100, continues to be Afib. CT head completed. Echo needed. UA/UC sent.   Pt became anxious and agitated after daughter and daughter's  left for the evening. Pt forcefully attempted to remove gait belt once applied for balance needing help using urinal. Pt attempted to get out of bed x5, setting off alarm, stating \"I'm not staying here,\" \"I need to go home.\" removed telemetry box. seroquel given x1 for agitation. Requested assistance supervising pt from charge RN.  "

## 2018-12-22 NOTE — PLAN OF CARE
Pt stable over night, Pt in A fib with CVR.  Pt did received one dose if PRN Metroprolol for HR greater than 120.  Pt denies pain or sob.  Pt is alert to self only and is very forgetful and can get agitated at times.  Pt had a sitter over night.  Will continue to monitor.

## 2018-12-22 NOTE — PROGRESS NOTES
[facility]    Medicine Progress Note - Hospitalist Service       Date of Admission:  12/21/2018  Assessment & Plan  Paul Milligan is an 83-year-old male with past medical history of remote paroxysmal atrial fibrillation and subjective progressive cognitive decline as reported by daughter since March 2018.  He presented to the Emergency Department for evaluation of 3-day history of acute worsening confusion with associated tachycardia and identified to be in atrial fibrillation with RVR with possible underlying UTI.     AFib with RVR   Heart Failure mild, unknown EF.- h/o paroxysmal afib. TSH nl. K 4 at admission, Mg 2.2. BNP elevated with small right pleural effusion on CXR, and patchy infiltrate/possible edema, no SOB/hypoxia.   - Off diltiazem gtt  - Started metoprolol 25 mg BID, increase to 50 mg BID  - started on lasix 20 mg daily.   - If HR remains > 110, will add diltiazem PO.   - Challenge will be make sure patient takes his medication given disorientation with dementia.    - Echo pending.   - Follow daily weights and strict I and O's.   - CHADsVASc 3-4. (+/-CHF)  Stroke evaluation as below. Discussed with daughter on 12/22/18 and she would like to know if he has had recent strokes for both prognosis and to help her make a decision re: anticoagulation.     Altered Mental Status, in the context of probable Dementia - Family reports a progressive decline over the past 6-7 months. Daughter staying with parents over the last 3 and notes significant confusion overnight, and this is what prompted ER visit (not afib)  - Head CT shows pronounced atrophy within the hippocampus  - MRI ordered. Give seroquel before test.     Prolonged QTc on admission   - follow on telemetry with EKG in AM as he will receive seroquel PRN.     Dysuria with urinary frequency and urgency.  UA negative           Diet: Combination Diet Regular Diet Adult; Low Lactose Diet    DVT Prophylaxis: Pneumatic Compression  Devices  Petersen Catheter: not present  Code Status: Full Code      Disposition Plan   Expected discharge: 1-2 days , recommended to transitional care unit once HR controlled, have made a decision re: anticoagulation, safe discharge plan.  Entered: Miladis Herrera MD 12/22/2018, 11:05 AM       Miladis Herrera MD  Hospitalist Service  [facility]    ______________________________________________________________________    Interval History Patient received seroquel last night x 1, more agitated this morning and confused. No SOB, HR elevated when he was late for medications this AM. Otherwise controlled. Off dilt gtt. No N/V.     Data reviewed today: I reviewed all medications, new labs and imaging results over the last 24 hours. I personally reviewed the EKG tracing showing afib with intermittent RVR.    Physical Exam   Vital Signs: Temp: 97.4  F (36.3  C) Temp src: Oral BP: 106/79 Pulse: 64 Heart Rate: 101 Resp: 20 SpO2: 97 % O2 Device: None (Room air)    Weight: 141 lbs 12.8 oz  Constitutional:  NAD,   Neuropsyche:  Not oriented, able to answer some questions appropriately, currently calm  Respiratory:  Breathing comfortably, good air exchange, no wheezes, no crackles.   Cardiovascular:  Irregular rate and rhythm, no edema.  GI:  soft, NT/ND, BS normal  Skin/Integumen:  No acute rash, bruising or sign of bleeding.         Data   Recent Labs   Lab 12/22/18  0533 12/21/18  2255 12/21/18  1644 12/21/18  1050   WBC  --   --   --  4.4   HGB  --   --   --  13.4   MCV  --   --   --  98   PLT  --   --   --  233     --   --  138   POTASSIUM 3.4  --   --  4.0   CHLORIDE 104  --   --  102   CO2 25  --   --  28   BUN 14  --   --  15   CR 1.10  --   --  1.14   ANIONGAP 9  --   --  8   LILIANA 8.6  --   --  9.2   GLC 78  --   --  111*   TROPI  --  0.015 0.018 0.016     Recent Results (from the past 24 hour(s))   CT Head w/o Contrast    Narrative    CT SCAN OF THE HEAD WITHOUT CONTRAST   12/21/2018 6:30 PM      HISTORY:  Six to seven-month history of worsening cognition.    TECHNIQUE:  Axial images of the head and coronal reformations without  IV contrast material. Radiation dose for this scan was reduced using  automated exposure control, adjustment of the mA and/or kV according  to patient size, or iterative reconstruction technique.    COMPARISON: None.    FINDINGS: There is no evidence of intracranial hemorrhage, mass, acute  infarct or anomaly. There is generalized atrophy of the brain.  Moderate hippocampal atrophy. There is low attenuation in the white  matter of the cerebral hemispheres consistent with sequelae of small  vessel ischemic disease. Ventricular size is within normal limits  without evidence of hydrocephalus.     The visualized portions of the sinuses and mastoids appear normal. The  bony calvarium and bones of the skull base appear intact.       Impression    IMPRESSION:     1. No evidence of acute intracranial hemorrhage, mass, or herniation.  2. There is generalized atrophy of the brain. Moderate hippocampal  atrophy. White matter changes are present in the cerebral hemispheres  that are consistent with small vessel ischemic disease in this age  patient.      SUNNY SR MD     Medications     Reason anticoagulant not prescribed for atrial fibrillation         aspirin  325 mg Oral Daily     furosemide  20 mg Oral Daily     gadobutrol  6 mL Intravenous Once     metoprolol tartrate  50 mg Oral BID     QUEtiapine  25 mg Oral At Bedtime

## 2018-12-23 ENCOUNTER — APPOINTMENT (OUTPATIENT)
Dept: OCCUPATIONAL THERAPY | Facility: CLINIC | Age: 83
DRG: 057 | End: 2018-12-23
Attending: INTERNAL MEDICINE
Payer: MEDICARE

## 2018-12-23 ENCOUNTER — APPOINTMENT (OUTPATIENT)
Dept: PHYSICAL THERAPY | Facility: CLINIC | Age: 83
DRG: 057 | End: 2018-12-23
Attending: INTERNAL MEDICINE
Payer: MEDICARE

## 2018-12-23 LAB
CALCIUM SERPL-MCNC: 8.3 MG/DL (ref 8.5–10.1)
CHLORIDE SERPL-SCNC: 104 MMOL/L (ref 94–109)
CO2 SERPL-SCNC: 23 MMOL/L (ref 20–32)
CREAT SERPL-MCNC: 1.18 MG/DL (ref 0.66–1.25)
GFR SERPL CREATININE-BSD FRML MDRD: 57 ML/MIN/{1.73_M2}
GLUCOSE SERPL-MCNC: 81 MG/DL (ref 70–99)
POTASSIUM SERPL-SCNC: 3.7 MMOL/L (ref 3.4–5.3)
SODIUM SERPL-SCNC: 137 MMOL/L (ref 133–144)

## 2018-12-23 PROCEDURE — 40000193 ZZH STATISTIC PT WARD VISIT

## 2018-12-23 PROCEDURE — A9270 NON-COVERED ITEM OR SERVICE: HCPCS | Mod: GY | Performed by: INTERNAL MEDICINE

## 2018-12-23 PROCEDURE — 36415 COLL VENOUS BLD VENIPUNCTURE: CPT | Performed by: INTERNAL MEDICINE

## 2018-12-23 PROCEDURE — A9270 NON-COVERED ITEM OR SERVICE: HCPCS | Mod: GY | Performed by: PSYCHIATRY & NEUROLOGY

## 2018-12-23 PROCEDURE — 97116 GAIT TRAINING THERAPY: CPT | Mod: GP

## 2018-12-23 PROCEDURE — 93010 ELECTROCARDIOGRAM REPORT: CPT | Performed by: INTERNAL MEDICINE

## 2018-12-23 PROCEDURE — 82310 ASSAY OF CALCIUM: CPT | Performed by: INTERNAL MEDICINE

## 2018-12-23 PROCEDURE — 99233 SBSQ HOSP IP/OBS HIGH 50: CPT | Performed by: INTERNAL MEDICINE

## 2018-12-23 PROCEDURE — 97166 OT EVAL MOD COMPLEX 45 MIN: CPT | Mod: GO | Performed by: OCCUPATIONAL THERAPIST

## 2018-12-23 PROCEDURE — 25000132 ZZH RX MED GY IP 250 OP 250 PS 637: Mod: GY | Performed by: INTERNAL MEDICINE

## 2018-12-23 PROCEDURE — 93005 ELECTROCARDIOGRAM TRACING: CPT

## 2018-12-23 PROCEDURE — 97162 PT EVAL MOD COMPLEX 30 MIN: CPT | Mod: GP

## 2018-12-23 PROCEDURE — 99223 1ST HOSP IP/OBS HIGH 75: CPT | Performed by: PSYCHIATRY & NEUROLOGY

## 2018-12-23 PROCEDURE — 97535 SELF CARE MNGMENT TRAINING: CPT | Mod: GO | Performed by: OCCUPATIONAL THERAPIST

## 2018-12-23 PROCEDURE — 82947 ASSAY GLUCOSE BLOOD QUANT: CPT | Performed by: INTERNAL MEDICINE

## 2018-12-23 PROCEDURE — 80051 ELECTROLYTE PANEL: CPT | Performed by: INTERNAL MEDICINE

## 2018-12-23 PROCEDURE — 21000001 ZZH R&B HEART CARE

## 2018-12-23 PROCEDURE — 25000132 ZZH RX MED GY IP 250 OP 250 PS 637: Mod: GY | Performed by: PSYCHIATRY & NEUROLOGY

## 2018-12-23 PROCEDURE — 82565 ASSAY OF CREATININE: CPT | Performed by: INTERNAL MEDICINE

## 2018-12-23 PROCEDURE — 25000132 ZZH RX MED GY IP 250 OP 250 PS 637: Mod: GY | Performed by: PHYSICIAN ASSISTANT

## 2018-12-23 PROCEDURE — 40000133 ZZH STATISTIC OT WARD VISIT: Performed by: OCCUPATIONAL THERAPIST

## 2018-12-23 PROCEDURE — A9270 NON-COVERED ITEM OR SERVICE: HCPCS | Mod: GY | Performed by: PHYSICIAN ASSISTANT

## 2018-12-23 RX ORDER — OLANZAPINE 10 MG/2ML
5 INJECTION, POWDER, FOR SOLUTION INTRAMUSCULAR DAILY PRN
Status: DISCONTINUED | OUTPATIENT
Start: 2018-12-23 | End: 2018-12-28 | Stop reason: HOSPADM

## 2018-12-23 RX ORDER — DILTIAZEM HYDROCHLORIDE 120 MG/1
120 CAPSULE, EXTENDED RELEASE ORAL DAILY
Status: DISCONTINUED | OUTPATIENT
Start: 2018-12-23 | End: 2018-12-28 | Stop reason: HOSPADM

## 2018-12-23 RX ADMIN — DILTIAZEM HYDROCHLORIDE 120 MG: 120 CAPSULE, EXTENDED RELEASE ORAL at 18:09

## 2018-12-23 RX ADMIN — POTASSIUM CHLORIDE 20 MEQ: 1500 TABLET, EXTENDED RELEASE ORAL at 15:51

## 2018-12-23 RX ADMIN — Medication 12.5 MG: at 18:18

## 2018-12-23 RX ADMIN — Medication 12.5 MG: at 11:40

## 2018-12-23 RX ADMIN — ASPIRIN 325 MG: 325 TABLET, DELAYED RELEASE ORAL at 11:40

## 2018-12-23 RX ADMIN — Medication 12.5 MG: at 09:32

## 2018-12-23 RX ADMIN — METOPROLOL TARTRATE 50 MG: 50 TABLET, FILM COATED ORAL at 11:39

## 2018-12-23 RX ADMIN — QUETIAPINE 12.5 MG: 25 TABLET, FILM COATED ORAL at 15:29

## 2018-12-23 RX ADMIN — METOPROLOL TARTRATE 50 MG: 50 TABLET, FILM COATED ORAL at 18:09

## 2018-12-23 ASSESSMENT — MIFFLIN-ST. JEOR: SCORE: 1423.39

## 2018-12-23 ASSESSMENT — ACTIVITIES OF DAILY LIVING (ADL)
ADLS_ACUITY_SCORE: 21
ADLS_ACUITY_SCORE: 23
ADLS_ACUITY_SCORE: 21
PREVIOUS_RESPONSIBILITIES: YARDWORK;DRIVING
ADLS_ACUITY_SCORE: 23

## 2018-12-23 NOTE — PROGRESS NOTES
12/23/18 1231   Quick Adds   Type of Visit Initial Occupational Therapy Evaluation   Living Environment   Lives With spouse   Living Arrangements house   Living Environment Comment Lives in a rambler-style home. Has 2 steps to enter the home from the garage. Has a railing on the L as he is entering. He uses a walk-in shower with grab bars in the basement.   Self-Care   Usual Activity Tolerance good   Current Activity Tolerance fair   Equipment Currently Used at Home none   Activity/Exercise/Self-Care Comment Recently has been having limited endurance.   Functional Level   Ambulation 0-->independent   Transferring 0-->independent   Toileting 0-->independent   Bathing 0-->independent   Dressing 0-->independent   Eating 0-->independent   Swallowing 0-->swallows foods/liquids without difficulty   Fall history within last six months no   Prior Functional Level Comment Was walking up to 2 miles per day, sometimes outside and sometimes at the mall with family. Has been doing the yardwork and still driving familiar routes.   General Information   Onset of Illness/Injury or Date of Surgery - Date 12/22/18   Referring Physician Miladis Herrera MD   Patient/Family Goals Statement Patient wants to go home; family concerned about what is the best/safest discharge plan   Additional Occupational Profile Info/Pertinent History of Current Problem aPul Milligan is an 83-year-old male with past medical history of remote paroxysmal atrial fibrillation and subjective progressive cognitive decline as reported by daughter since March 2018.  He presented to the Emergency Department for evaluation of 3-day history of acute worsening confusion with associated tachycardia and identified to be in atrial fibrillation with RVR with possible underlying UTI.    Precautions/Limitations fall precautions   General Observations Sitting up in a chair with 1:1 present. 2 daughters also present.   Cognitive Status Examination   Orientation person    Level of Consciousness alert;agitated   Follows Commands (Cognition) follows one step commands   Memory impaired   Attention Sustained attention impaired   Organization/Problem Solving Problem solving impaired   Executive Function Working memory impaired, decreased storage of information for performing tasks;Planning ability impaired   Visual Perception   Visual Perception No deficits were identified   Visual Perception Comments Denied troubles   Sensory Examination   Sensory Quick Adds No deficits were identified   Pain Assessment   Patient Currently in Pain No   Range of Motion (ROM)   ROM Comment R shoulder limited to approx 90 flexion from previous frozen shoulder, otherwise WNL   Strength   Strength Comments 5/5 LUE and R biceps/triceps   Hand Strength   Hand Strength Comments strong, equal grasp   Muscle Tone Assessment   Muscle Tone Quick Adds No deficits were identified   Coordination   Coordination Comments difficulty following commands for finger to thumb opposition   Mobility   Bed Mobility Comments Not observed; SBA per report   Transfer Skills   Transfer Comments Not observed this session; needs CGA from 1:1 attendant   Balance   Balance Comments Patient has been walking halls with CGA-MIN A without AD, with unsteadiness and path deviations per 1:1 attendant   Upper Body Dressing   Level of [place]: Dress Upper Body minimum assist (75% patients effort)   Physical Assist/Nonphysical Assist: Dress Upper Body verbal cues   Lower Body Dressing   Level of [place]: Dress Lower Body minimum assist (75% patients effort)   Physical Assist/Nonphysical Assist: Dress Lower Body verbal cues   Toileting   Level of [place]: Toilet contact guard   Physical Assist/Nonphysical Assist: Toilet verbal cues   Grooming   Level of [place]: Grooming contact guard   Physical Assist/Nonphysical Assist: Grooming verbal cues   Eating/Self Feeding   Level of [place]: Eating stand-by assist   Physical  Assist/Nonphysical Assist: Eating set-up required   Instrumental Activities of Daily Living (IADL)   Previous Responsibilities yardwork;driving   Activities of Daily Living Analysis   Impairments Contributing to Impaired Activities of Daily Living balance impaired;cognition impaired   General Therapy Interventions   Planned Therapy Interventions ADL retraining;cognition;transfer training;risk factor education;progressive activity/exercise   Clinical Impression   Criteria for Skilled Therapeutic Interventions Met yes, treatment indicated   OT Diagnosis impaired ADL due to cognitive impairment   Influenced by the following impairments impaired activity tolerance-tachycardia   Assessment of Occupational Performance 3-5 Performance Deficits   Identified Performance Deficits ADL, IADL, mobility, cognition, social   Clinical Decision Making (Complexity) Moderate complexity   Therapy Frequency 5 times/wk   Predicted Duration of Therapy Intervention (days/wks) 1 week   Anticipated Equipment Needs at Discharge shower chair;toileting equipment   Anticipated Discharge Disposition Transitional Care Facility;Long Term Care Facility;Other (see comments)   Risks and Benefits of Treatment have been explained. Yes   Patient, Family & other staff in agreement with plan of care Yes   Clinical Impression Comments Needs close 24 hour care due to dementia, whether at home or TCU, but would initially recommend discharge to TCU   Total Evaluation Time   Total Evaluation Time (Minutes) 15

## 2018-12-23 NOTE — PLAN OF CARE
OT: Patient is confused with 1:1 attendant. Will hold OT until later today when daughter available to provide history, as per chart, patient was living in his own home but with worsening mentation over the last year.

## 2018-12-23 NOTE — PROGRESS NOTES
[facility]    Medicine Progress Note - Hospitalist Service       Date of Admission:  12/21/2018  Assessment & Plan  Paul Milligan is an 83-year-old male with past medical history of remote paroxysmal atrial fibrillation and subjective progressive cognitive decline as reported by daughter since March 2018.  He presented to the Emergency Department for evaluation of 3-day history of acute worsening confusion with associated tachycardia and identified to be in atrial fibrillation with RVR with possible underlying UTI.     AFib with RVR   Heart Failure mild, unknown EF.- h/o paroxysmal afib. TSH nl. K 4 at admission, Mg 2.2. BNP elevated with small right pleural effusion on CXR, and patchy infiltrate/possible edema, no SOB/hypoxia.   - Started metoprolol 50 mg BID.   - Given lasix 20 mg daily x 2 days, appears euvolemic, good oxygen saturations > discharge further doses.   - If HR remains > 110, will add diltiazem PO.   - Challenge will be make sure patient takes his medication given disorientation with dementia.    - Echo pending, patient refusing this AM due to disorientation.    - Follow daily weights and strict I and O's.   - On  mg daily.   - CHADsVASc 3-4. (+/-CHF)  No evidence of stroke on MRI. Discussed with patient's daughter, Velma that preventing stroke likely outweighs risk of bleed. Given his new diagnosis of dementia, she appropriately is considering whether she wants to start this.     Altered Mental Status, in the context of probable Dementia, Alzheimer's Type - Family reports a progressive decline over the past 6-7 months. Daughter staying with parents over the last 3 and notes significant confusion overnight, and this is what prompted ER visit (not afib). Head CT showed pronounced atrophy within the hippocampus, MRI negative for stroke.   - Continues to be paranoid, difficult to control.   - Seroquel ordered 25 mg q HS with PRN doses during the day.   - Zyprexa IM  - Psychiatry  consulted for their recommendations for managing behavioral disturbance. Namenda?   - Calms when daughter available.   - Will likely need to discharge to Memory Care. Per daughter, wife not capable of taking care of him at home. PT/OT, social work consulted.     Prolonged QTc reviewed EKG shows QT of 380 and due to afib I think QTc may be overestimated. Discussed with EP cardiologist, Dr. Logan. We reviewed EKG.   - He agrees low risk, likely over-estimated due to afib. Continue psychotropic medications following on telemetry.     Dysuria with urinary frequency and urgency - UA completely normal and no fever, leukocytosis or other signs of infection.   - RN's reporting foul smell on 12/23/18. Repeat UA. If negative, no culture indicated.   - encouragePO intake, as dehydration, most likely cause of foul smell.         Diet: Combination Diet Regular Diet Adult; Low Lactose Diet    DVT Prophylaxis: Pneumatic Compression Devices  Petersen Catheter: not present  Code Status: Full Code      Communication: Discussed with cardiology, RN and daughter on 12/23/18     Disposition Plan   Expected discharge: 1-2 days , recommended to transitional care unit once HR controlled, have made a decision re: anticoagulation, safe discharge plan. PT, OT, Social work consulted.   Entered: Miladis Herrera MD 12/23/2018, 9:54 AM       Miladis Herrera MD  Hospitalist Service  [facility]    ______________________________________________________________________    Interval History  Patient always agitated in the AM, refusing medications and cares. Wants to leave. No respiratory distress. VSS except for afib with RVR when agitated and not taking medications. Nose notes foul smell of urine. Patient unable to participate in ROS.     Data reviewed today: I reviewed all medications, new labs and imaging results over the last 24 hours. I personally reviewed the EKG tracing showing afib with intermittent RVR. QTc 380, QTc calculated  at 510. RBBB and LAFB    Physical Exam   Vital Signs: Temp: 98  F (36.7  C) Temp src: Axillary BP: (!) 133/104 Pulse: 60 Heart Rate: 85 Resp: 18 SpO2: 90 % O2 Device: None (Room air)    Weight: 143 lbs 0 oz  Constitutional:  NAD,   Neuropsyche:  Not oriented, paranoid, but not aggressive. Answering questions, but disoriented wants to go home.  Respiratory:  Breathing comfortably, good air exchange, no wheezes, no crackles.   Cardiovascular:  Irregular rate and rhythm, tachy, no edema.  GI:  soft, NT/ND, BS normal   Skin/Integumen:  No acute rash, bruising or sign of bleeding.         Data   Recent Labs   Lab 12/23/18  0548 12/22/18  0533 12/21/18  2255 12/21/18  1644 12/21/18  1050   WBC  --   --   --   --  4.4   HGB  --   --   --   --  13.4   MCV  --   --   --   --  98   PLT  --   --   --   --  233   NA  --  138  --   --  138   POTASSIUM 3.7 3.4  --   --  4.0   CHLORIDE  --  104  --   --  102   CO2  --  25  --   --  28   BUN  --  14  --   --  15   CR  --  1.10  --   --  1.14   ANIONGAP  --  9  --   --  8   LILIANA  --  8.6  --   --  9.2   GLC  --  78  --   --  111*   TROPI  --   --  0.015 0.018 0.016     Recent Results (from the past 24 hour(s))   MR Brain w/o & w Contrast    Narrative    MRI BRAIN WITHOUT AND WITH CONTRAST  12/22/2018 2:02 PM    HISTORY:  Altered level of consciousness. Atrial fibrillation.     TECHNIQUE:  Multiplanar, multisequence MRI of the brain without and  with 6 mL Gadavist.    COMPARISON: CT head 12/21/2018    FINDINGS:  There is generalized atrophy of the brain. White matter T2  hyperintensities are seen in the cerebral hemispheres consistent with  sequelae of small vessel ischemic disease.  There is no evidence of  hemorrhage, mass, acute infarct, or anomaly.  There are no gadolinium  enhancing lesions.     The facial structures appear normal. The arteries at the base of the  brain and the dural venous sinuses appear patent.       Impression    IMPRESSION:     1. No evidence of infarct or  other acute abnormality.  2. Brain atrophy and white matter changes consistent with sequelae of  small vessel ischemic disease.     ADRIAN VERMA MD     Medications     Reason anticoagulant not prescribed for atrial fibrillation         aspirin  325 mg Oral Daily     furosemide  20 mg Oral Daily     metoprolol tartrate  50 mg Oral BID     QUEtiapine  25 mg Oral At Bedtime

## 2018-12-23 NOTE — PROGRESS NOTES
Discussed plan with daughter and she and her sister both want to start anticoagulation for stroke prevention for atrial fibrillation. Stop ASA. start apixaban tomorrow. Pharmacy Liason consulted.     Started diltiazem for improved HR control.

## 2018-12-23 NOTE — PLAN OF CARE
Discharge Planner PT   Patient plan for discharge: pt wants to go home  Current status: PT consult received. Chart reviewed. PT evaluation and treatment initiated. 83-year-old male with past medical history of remote paroxysmal atrial fibrillation and subjective progressive cognitive decline as reported by daughter since March 2018.  He presented to the Emergency Department for evaluation of 3-day history of acute worsening confusion with associated tachycardia and identified to be in atrial fibrillation with RVR with possible underlying UTI.PLOF: lives with wife in rambler home with 2 steps and platform step to enter. Was shoveling driveway about 1 month ago, does not use assistive device.     Currrently: Pt oriented to self only. Not oriented to hospital. Decreased command following. Pt very irritable throughout encounter, frequently questioning why he is doing things and easily upset. Min A x 1 for balance with sit to stand without assistive device. Amb 350ft without AD, min A x 2 people for safety due to balance and impulsivity, right hand hold assist, pt demos very fast gait speed-requires frequent cues to slow down for safety. intermittent lateral veering left and right, no scissoring, however demos narrow base of support. Flexed forward trunk. HR at rest 95bpm, HR post qfj=979c with mild SOB, BP post fyz=639/100mmHg, SPO2=97%.    Barriers to return to prior living situation: cognition, balance, stairs to enter home  Recommendations for discharge: TCU  Rationale for recommendations: Pt will benefit from continued skilled PT to address currently strength and balance impairments to maximize his safety with mobility. Pt's daughter's are concerned that pt's spouse will not be able to provide pt with level of assist he is currently needing.        Entered by: Amber Funk 12/23/2018 2:17 PM

## 2018-12-23 NOTE — CONSULTS
"Consult Date:  12/23/2018      REASON FOR CONSULTATION:  Altered mental status in the context of dementia.      REQUESTING CLINICIAN:  Miladis Herrera MD.      CHIEF COMPLAINT:  Dementia with behavioral disturbance.      HISTORY OF PRESENT ILLNESS:  Paul Milligan is an 83-year-old male with no past psychiatric history and a medical history to include remote atrial fibrillation, who was brought to the Emergency Department accompanied by his daughter for evaluation of worsening mental status over the preceding week.  He had no formal past diagnosis of dementia, though family first started noticing symptoms in that regard around March of this year.  The first thing they recognized was they were getting new tires put on the car and while that was taking place, he began getting confused and was not certain why the car was at the facility.  Since that time, his mental status has ebbed and flowed, but he has been behaviorally contained.  Some days are better than others and he is able to be redirected.  They typically find that evenings are more difficult for him with respect to disorientation.  He has been having episodes wherein he is not recognizing his wife and will perseverate on \"wanting to go home,\" which is in reference to [place].      They started noticing a more abrupt decline around Thanksgiving where he was very fatigued and not really interactive.  However, over the past week, they have noticed that he has been increasingly more confused.  They have not seen any response to internal stimuli, though they have noticed that he has been getting up to use the bathroom on the order of 8 times per night.  Given these changes in the urinary symptoms, family was concerned about a urinary tract infection and brought him to the Emergency Department for evaluation.      In the Emergency Department, he was found to be hypertensive and tachycardic at 152.  EKG demonstrated fibrillation with RVR and chest x-ray " demonstrated a small right pleural effusion.  CBC and BMP were unremarkable.  BNP was elevated (2345).  Negative troponins.  There was mention of concerns about a prolonged QT interval and use of antipsychotics; however, Cardiology determined that QTc may be artificially inflated due to AFib and that he remains at low risk with respect to antipsychotic usage.  Given urinary complaints, a UA was completed which thus far has been normal.  He has been afebrile.  They will repeat the UA, but there is some suspicion that it was simple dehydration, which may be leading to some of the foul smell.      On interview, the patient is fully disoriented, but calm and typically sits in the room with his eyes closed.  The bulk of the history as described above was gathered from his 2 daughters who were present in the room.  He apparently slept well last night with the utilization of Seroquel, whereas prior sleep was more elusive.  He still is having some periods of confusion and agitation, most notably this morning, and has received 2 small doses of Seroquel.  He has not required restraint.  Given the circumstances, family is wondering about the potential for improvement versus needs to find more supportive cares as he lives independently with his wife and his care needs are likely escalating beyond her abilities.      PAST PSYCHIATRIC HISTORY:  None.      PAST CHEMICAL DEPENDENCY HISTORY:  None.      PAST MEDICAL HISTORY:  Remote history of paroxysmal atrial fibrillation.      PAST SURGICAL HISTORY:  Right shoulder surgery.      FAMILY HISTORY:  Father stroke.   Mother with cognitive impairment.      SOCIAL HISTORY:  The patient lives independently with his wife in [place], [place].  He is supported by 2 of his daughters.      REVIEW OF SYSTEMS:  Ten-point review of systems completed and negative other than noted in HPI.      ALLERGIES:  NO KNOWN DRUG ALLERGIES.      MEDICATIONS:  No prior to admission medications.       MENTAL STATUS EXAMINATION:  Tall, lean, elderly male, sitting in hospital chair, accompanied by his 2 daughters.  He is fully disoriented, but calm and pleasant.  The bulk of my time in the room was spent with him sitting calmly in his chair with his eyes closed, though he shook my hand upon leaving.  No evidence of response to internal stimuli.  Insight is minimal.  No active safety concerns.      VITAL SIGNS:  Temperature 98, pulse 60, respirations 18, blood pressure 133/104, oxygen saturation 90%.      DIAGNOSES:   1.  Delirium on top of Alzheimer's dementia.   2.  Atrial fibrillation with rapid ventricular response.   3.  Dysuria with urinary frequency and urgency, rule out UTI.      ASSESSMENT:  Paul Milligan is an 83-year-old male with no past psychiatric history and has been rather healthy through the bulk of his life without much interface with the medical system.  Family noticed the emergence of memory difficulties beginning in March of this year and has seen a progressive decline since that time, though he has generally been able to be reoriented and dealt with with minimal difficulties since that time.  However, over the past week in particular, they have noticed a much more dramatic worsening of symptoms to include worsening confusion, sleep, and behavioral abnormalities.  He was brought to the Emergency Department and found to be in atrial fibrillation with RVR and given urinary complaints, a urinalysis was performed which was initially negative.  They are repeating the urinalysis, but at present there are no markers of infection.  He was responsive to Seroquel last night and restoration of sleep, though had some behavioral challenges this morning.  He received a couple low doses of Seroquel and at present is calm.  We will transition those p.r.n. doses to scheduled Seroquel and titrate towards further benefit while the medical team continues investigating possible sources of delirium.      PLAN:   1.   Begin Seroquel 12.5 mg in the morning, 12.5 mg at 2:00 p.m. and continue with 25 mg each day at bedtime dose.   2.  Will adjust Seroquel pending response.   3.  Family in consideration of potential ongoing care needs upon discharge.   4.  Reconsult Psychiatry.         SAMI SANABRIA DO             D: 2018   T: 2018   MT: JAIMIE      Name:     MARCE BOO   MRN:      [id number]        Account:       [id number]   :      1935           Consult Date:  2018      Document: [id number]       cc: Miladis Herrera MD

## 2018-12-23 NOTE — PLAN OF CARE
Discharge Planner OT   Patient plan for discharge: Patient wants to go home; family unsure of what to do.  Current status: OT eval completed and treatment initiated. Patient has 1:1 attendant. He has walked the halls several times with CGA-Min A with some path deviations and unsteadiness. Today, he was sitting up in the chair; HR 90's-120's at rest. Completed SLUMS; patient scored 2/30, indicating dementia. His two daughters were present and report that he was still driving, showering himself, shoveling the driveway. They have noticed a cognitive decline since last March, and one of his daughters reports this low score is not surprising to her.  Barriers to return to prior living situation: Dementia, need for close 24 hour care; unsteady gait with high falls risk and impaired tolerance to activity with tachycardia at rest.  Recommendations for discharge: Memory Care TCU. Patient should stop driving.  Rationale for recommendations: Patient needs close, 24 hour care at this time, due to progressive dementia. It is likely that his walking/balance and activity tolerance will improve, as just last week he was walking up to 2 miles per family report. Family does not think that their mother would be able to provide close, 24 hour care for him at their home, although he was reportedly functioning fairly well in his familiar environment prior to coming here.       Entered by: Selena Joel 12/23/2018 1:48 PM

## 2018-12-23 NOTE — PROGRESS NOTES
12 hour RN.  Transfers A1.  A/O to self.  Pt is confused.  Bedside attendant in place. Pt refused to take morning medications and refused echocardiogram.  Family called and updated.  2 daughters came in.  Pt did take medication after daughter arrived.  Pt did ambulate in halls x2.  VSS. Tele afib cvr/rvr.  HR ranges from 80-120s.  Occasionally HR goes to 140-150 with activity.  Pt started on po Diltiazem. Denies pain.  Psychiatry did consult.  Pt now on scheduled Seroquel BID + at bedtime.  Pt voiding w/o difficutly.  Plan TBD.  TCU or memory care?  UA sent down. Results pending.

## 2018-12-23 NOTE — PLAN OF CARE
Alert to self.  Unsteady.  Sitter at bedside. Received Seroquel on evening and slept well late evening and during night.  Cooperative and calm.  VSS.  AFIB with CVR with rate going as low as 60 during night. Started Metoprolol PO and has PRN IV available for heart rate >120.  Oral Lasix given, although patient up 2 pounds since yesterday. UA sent and normal despite urine having foul odor. Incontinent at times.  Psych to see today.

## 2018-12-23 NOTE — CONSULTS
Care Transition Initial Assessment - SW  Reason For Consult: discharge planning  Met with: FAMILY   Active Problems:    Atrial fibrillation with RVR (H)       DATA  Lives With: spouse   Living Arrangements: house  Quality of Family Relationships: involved, supportive  Description of Support System: Supportive, Involved  Who is your support system?: Wife, Children  Support Assessment: Adequate family and caregiver support.   Identified issues/concerns regarding health management: SW following for d.c needs  Quality of Family Relationships: involved, supportive     ASSESSMENT  Cognitive Status: DAELA  Concerns to be addressed: Patient is a 83 year old male who was admitted to the hospital for A-fib with RVR. Prior to hospitalization patient was living at home with spouse where he was managing well. Patient does have dementia and is currently requiring a sitter at bedside. Patient's daughter's (Dagoberto) are aware that patient will need TCU at d. and are in agreement to having a referral sent to [facility]. SW will send referrals and update family once assessments are complete. Patient's daughter's also [facility] for [facility].      PLAN  Financial costs for the patient includes   Patient given options and choices for discharge to memory care TCU  Patient/family is agreeable to the plan?  YES  Patient Goals and Preferences: [facility].  Patient anticipates discharging to: TCU-memory care    TOR Rockwell   [phone]

## 2018-12-23 NOTE — PROGRESS NOTES
12/23/18 1330   Quick Adds   Type of Visit Initial PT Evaluation   Living Environment   Lives With spouse   Living Arrangements house   Living Environment Comment Lives in a rambler-style home. Has 2 steps to enter the home from the garage. Has a railing on the L as he is entering. He uses a walk-in shower with grab bars in the basement.   Self-Care   Usual Activity Tolerance good   Current Activity Tolerance moderate   Equipment Currently Used at Home none   Activity/Exercise/Self-Care Comment Daughters report he was shoveling his driveway about a month ago   Functional Level Prior   Ambulation 0-->independent   Transferring 0-->independent   Toileting 0-->independent   Bathing 0-->independent   Fall history within last six months no   Prior Functional Level Comment Was walking up to 2 miles per day, sometimes outside and sometimes at the mall with family. Has been doing the yardwork and still driving familiar routes.   General Information   Onset of Illness/Injury or Date of Surgery - Date 12/21/18   Referring Physician Miladis Herrera MD   Patient/Family Goals Statement pt wants to go home   Pertinent History of Current Problem (include personal factors and/or comorbidities that impact the POC) 83-year-old male with past medical history of remote paroxysmal atrial fibrillation and subjective progressive cognitive decline as reported by daughter since March 2018.  He presented to the Emergency Department for evaluation of 3-day history of acute worsening confusion with associated tachycardia and identified to be in atrial fibrillation with RVR with possible underlying UTI.   Precautions/Limitations fall precautions   General Observations sitting up in chair, 2 daughters present at bedside. 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    Balance   Balance Comments decreased initial standing balance    Sensory Examination   Sensory Perception Comments pt denies numbness/tingling   Coordination   Coordination Comments NT-pt very irritable throughout encounter   General Therapy Interventions   Planned Therapy Interventions balance training;bed mobility training;gait training;strengthening;transfer training;progressive activity/exercise   Clinical Impression   Criteria for Skilled Therapeutic Intervention yes, treatment indicated   PT Diagnosis impaired gait   Influenced by the following impairments impaired balance, impaired cognition, impulsivity   Functional limitations due to impairments impaired ambulation, impaired stair negotiation   Clinical Presentation Evolving/Changing   Clinical Presentation Rationale based on cognition, clinical presentation, mood, requires 1:1 sitter.    Clinical Decision Making (Complexity) Moderate complexity   Therapy Frequency` 5 times/week   Predicted Duration of Therapy Intervention (days/wks) 1 week   Anticipated Equipment Needs at Discharge (TBD)   Anticipated Discharge  "Disposition Transitional Care Facility   Risk & Benefits of therapy have been explained Yes   Patient, Family & other staff in agreement with plan of care Yes   [facility] AM-PAC TM \"6 Clicks\"   2016, Trustees of [facility], under license to [employer].  All rights reserved.   6 Clicks Short Forms Basic Mobility Inpatient Short Form   [facility] AM-[facility]  \"6 Clicks\" V.2 Basic Mobility Inpatient Short Form   1. Turning from your back to your side while in a flat bed without using bedrails? 3 - A Little   2. Moving from lying on your back to sitting on the side of a flat bed without using bedrails? 3 - A Little   3. Moving to and from a bed to a chair (including a wheelchair)? 3 - A Little   4. Standing up from a chair using your arms (e.g., wheelchair, or bedside chair)? 3 - A Little   5. To walk in hospital room? 3 - A Little   6. Climbing 3-5 steps with a railing? 3 - A Little   Basic Mobility Raw Score (Score out of 24.Lower scores equate to lower levels of function) 18   Total Evaluation Time   Total Evaluation Time (Minutes) 13     "

## 2018-12-24 ENCOUNTER — APPOINTMENT (OUTPATIENT)
Dept: OCCUPATIONAL THERAPY | Facility: CLINIC | Age: 83
DRG: 057 | End: 2018-12-24
Payer: MEDICARE

## 2018-12-24 LAB
ALBUMIN UR-MCNC: 10 MG/DL
APPEARANCE UR: CLEAR
BILIRUB UR QL STRIP: NEGATIVE
COLOR UR AUTO: YELLOW
GLUCOSE UR STRIP-MCNC: NEGATIVE MG/DL
HGB UR QL STRIP: NEGATIVE
HYALINE CASTS #/AREA URNS LPF: 1 /LPF (ref 0–2)
KETONES UR STRIP-MCNC: NEGATIVE MG/DL
LEUKOCYTE ESTERASE UR QL STRIP: NEGATIVE
MAGNESIUM SERPL-MCNC: 2.3 MG/DL (ref 1.6–2.3)
MUCOUS THREADS #/AREA URNS LPF: PRESENT /LPF
NITRATE UR QL: NEGATIVE
PH UR STRIP: 5.5 PH (ref 5–7)
POTASSIUM SERPL-SCNC: 4 MMOL/L (ref 3.4–5.3)
RBC #/AREA URNS AUTO: 1 /HPF (ref 0–2)
SOURCE: ABNORMAL
SP GR UR STRIP: 1.02 (ref 1–1.03)
UROBILINOGEN UR STRIP-MCNC: NORMAL MG/DL (ref 0–2)
WBC #/AREA URNS AUTO: 1 /HPF (ref 0–5)

## 2018-12-24 PROCEDURE — 99232 SBSQ HOSP IP/OBS MODERATE 35: CPT | Performed by: INTERNAL MEDICINE

## 2018-12-24 PROCEDURE — 25000132 ZZH RX MED GY IP 250 OP 250 PS 637: Mod: GY | Performed by: PSYCHIATRY & NEUROLOGY

## 2018-12-24 PROCEDURE — 36415 COLL VENOUS BLD VENIPUNCTURE: CPT | Performed by: INTERNAL MEDICINE

## 2018-12-24 PROCEDURE — A9270 NON-COVERED ITEM OR SERVICE: HCPCS | Mod: GY | Performed by: INTERNAL MEDICINE

## 2018-12-24 PROCEDURE — 84132 ASSAY OF SERUM POTASSIUM: CPT | Performed by: INTERNAL MEDICINE

## 2018-12-24 PROCEDURE — A9270 NON-COVERED ITEM OR SERVICE: HCPCS | Mod: GY | Performed by: PSYCHIATRY & NEUROLOGY

## 2018-12-24 PROCEDURE — 25000132 ZZH RX MED GY IP 250 OP 250 PS 637: Mod: GY | Performed by: INTERNAL MEDICINE

## 2018-12-24 PROCEDURE — 99232 SBSQ HOSP IP/OBS MODERATE 35: CPT | Performed by: PSYCHIATRY & NEUROLOGY

## 2018-12-24 PROCEDURE — 93005 ELECTROCARDIOGRAM TRACING: CPT

## 2018-12-24 PROCEDURE — 83735 ASSAY OF MAGNESIUM: CPT | Performed by: INTERNAL MEDICINE

## 2018-12-24 PROCEDURE — 81001 URINALYSIS AUTO W/SCOPE: CPT | Performed by: INTERNAL MEDICINE

## 2018-12-24 PROCEDURE — 21000001 ZZH R&B HEART CARE

## 2018-12-24 PROCEDURE — 40000133 ZZH STATISTIC OT WARD VISIT: Performed by: OCCUPATIONAL THERAPIST

## 2018-12-24 PROCEDURE — 97535 SELF CARE MNGMENT TRAINING: CPT | Mod: GO | Performed by: OCCUPATIONAL THERAPIST

## 2018-12-24 PROCEDURE — 93010 ELECTROCARDIOGRAM REPORT: CPT | Performed by: INTERNAL MEDICINE

## 2018-12-24 RX ORDER — QUETIAPINE FUMARATE 25 MG/1
50 TABLET, FILM COATED ORAL AT BEDTIME
Status: DISCONTINUED | OUTPATIENT
Start: 2018-12-24 | End: 2018-12-28

## 2018-12-24 RX ADMIN — DILTIAZEM HYDROCHLORIDE 120 MG: 120 CAPSULE, EXTENDED RELEASE ORAL at 18:38

## 2018-12-24 RX ADMIN — QUETIAPINE 12.5 MG: 25 TABLET, FILM COATED ORAL at 13:41

## 2018-12-24 RX ADMIN — POTASSIUM CHLORIDE 20 MEQ: 1500 TABLET, EXTENDED RELEASE ORAL at 09:46

## 2018-12-24 RX ADMIN — APIXABAN 5 MG: 5 TABLET, FILM COATED ORAL at 08:48

## 2018-12-24 RX ADMIN — Medication 12.5 MG: at 18:38

## 2018-12-24 RX ADMIN — QUETIAPINE 12.5 MG: 25 TABLET, FILM COATED ORAL at 08:48

## 2018-12-24 RX ADMIN — METOPROLOL TARTRATE 50 MG: 50 TABLET, FILM COATED ORAL at 18:38

## 2018-12-24 RX ADMIN — QUETIAPINE 50 MG: 25 TABLET ORAL at 20:11

## 2018-12-24 RX ADMIN — APIXABAN 5 MG: 5 TABLET, FILM COATED ORAL at 20:12

## 2018-12-24 RX ADMIN — METOPROLOL TARTRATE 50 MG: 50 TABLET, FILM COATED ORAL at 08:48

## 2018-12-24 ASSESSMENT — MIFFLIN-ST. JEOR: SCORE: 1409.79

## 2018-12-24 ASSESSMENT — ACTIVITIES OF DAILY LIVING (ADL)
ADLS_ACUITY_SCORE: 21
ADLS_ACUITY_SCORE: 21
ADLS_ACUITY_SCORE: 23

## 2018-12-24 NOTE — PLAN OF CARE
"Patient restless latter part of evening and most of night shift.  Patient did not sleep at all.  Has a general mistrust of sitters.  Patient pulling off telemetry wires.  Refuses BP's and oral meds.  Mistrusts the medications we are giving him.  Patient talks about wanting to \"walk out of here and get in my car and go!\".  Patient oriented only to self.  Unable to remember daughter's names.  Repeat UA obtained and sent to lab.  Urine appeared concentrated, clear and no foul odor noted at this time.  Patient slightly unsteady when up.  "

## 2018-12-24 NOTE — PLAN OF CARE
"PT: Attempted PT session, pt resting comfortably in bed, declining participation stating \"I did enough already today.\" Provided encouragement but pt continue to decline participation at this time.  "

## 2018-12-24 NOTE — PROGRESS NOTES
[facility]    Medicine Progress Note - Hospitalist Service       Date of Admission:  12/21/2018  Assessment & Plan  Paul Milligan is an 83-year-old male with past medical history of remote paroxysmal atrial fibrillation and subjective progressive cognitive decline as reported by daughter since March 2018.  He presented to the Emergency Department for evaluation of 3-day history of acute worsening confusion with associated tachycardia and identified to be in atrial fibrillation with RVR with possible underlying UTI.     AFib with RVR   Heart Failure mild, unknown EF.- h/o paroxysmal afib. TSH nl. K 4 at admission, Mg 2.2. BNP elevated with small right pleural effusion on CXR, and patchy infiltrate/possible edema, no SOB/hypoxia.    - Started metoprolol 50 mg BID at admission.   - Started diltiazem  mg daily on 12/23/18   - HR better controlled.   - Given lasix 20 mg daily x 2 days, appears euvolemic, good oxygen saturations > discharge further doses. Wt down on 12/24/18.   - Challenge will be make sure patient takes his medication given disorientation with dementia.    - Echo still pending on 12/24/18, patient previously refusing but now calm, discussed with HUC to make sure this is done.    - CHADsVASc 3-4. (+/-CHF)  No evidence of stroke on MRI. Discussed with patient's daughter, Velma that preventing stroke likely outweighs risk of bleed. Despite dementia, physically fit, no history of falls. Both daughters agree.   - Started eliquis 5 mg BID on 12/24.     Probable Dementia, Alzheimer's Type, New Diagnosis - Family reports a progressive decline over the past 6-7 months. Daughter staying with parents over the 3 days PTA and noted significant confusion overnight, and this is what prompted ER visit (not afib). Head CT showed pronounced atrophy within the hippocampus, MRI negative for stroke, shows small vessel ischemic disease.   - Calm and cooperative on 12/24/18   - Appreciate psychiatry  consult on 12/24/18. Increased seroquel to 50 mg HS with 12.5 mg BID during the day.   - Zyprexa IM available for emergency.   - Needs TCU at discharge.     Prolonged QTc reviewed EKG shows QT of 380 and due to afib I think QTc may be overestimated. Discussed with EP cardiologist, Dr. Logan. We reviewed EKG.   - He agrees low risk, likely over-estimated due to afib. Continue psychotropic medications following on telemetry.   - With increase in seroquel. Ordered EKG for 12/25.     Foul smelling urine -  - RN's reporting foul smell on 12/23/18. Repeat UA remains negative, appears dehydrated. No indication for UCx     Diet: Combination Diet Regular Diet Adult; Low Lactose Diet    DVT Prophylaxis: Pneumatic Compression Devices  Petersen Catheter: not present  Code Status: Full Code      Communication: Discussed with daughter on 12/23/18     Disposition Plan   Expected discharge: 1-2 days , recommended to transitional care unit once without sitter for 24 hours and bed available, HR remains controlled. . PT, OT, Social work consulted.   Entered: Miladis Herrera MD 12/24/2018, 5:41 PM       Miladis Herrera MD  Hospitalist Service  [facility]    ______________________________________________________________________    Interval History  Much more calm and cooperative today. HR controlled this morning. No SOB, weight down, fevers. Can not give history.     Data reviewed today: I reviewed all medications, new labs and imaging results over the last 24 hours. I personally reviewed the EKG tracing showing afib with intermittent RVR. QTc 380, QTc calculated at 510. RBBB and LAFB    Physical Exam   Vital Signs: Temp: 97.3  F (36.3  C) Temp src: Oral BP: (!) 117/91 Pulse: 88 Heart Rate: 72 Resp: 16 SpO2: 97 % O2 Device: None (Room air)    Weight: 140 lbs 0 oz  Constitutional:  NAD,   Neuropsyche:  Not oriented cooperative and calm today.  Respiratory:  Breathing comfortably, good air exchange, no wheezes, no  crackles.   Cardiovascular:  Irregular rate and rhythm, tachy, no edema.  GI:  soft, NT/ND, BS normal   Skin/Integumen:  No acute rash, bruising or sign of bleeding.         Data   Recent Labs   Lab 12/24/18  0649 12/23/18  0548 12/22/18  0533 12/21/18  2255 12/21/18  1644 12/21/18  1050   WBC  --   --   --   --   --  4.4   HGB  --   --   --   --   --  13.4   MCV  --   --   --   --   --  98   PLT  --   --   --   --   --  233   NA  --  137 138  --   --  138   POTASSIUM 4.0 3.7 3.4  --   --  4.0   CHLORIDE  --  104 104  --   --  102   CO2  --  23 25  --   --  28   BUN  --   --  14  --   --  15   CR  --  1.18 1.10  --   --  1.14   ANIONGAP  --   --  9  --   --  8   LILIANA  --  8.3* 8.6  --   --  9.2   GLC  --  81 78  --   --  111*   TROPI  --   --   --  0.015 0.018 0.016     No results found for this or any previous visit (from the past 24 hour(s)).  Medications     Reason anticoagulant not prescribed for atrial fibrillation         apixaban ANTICOAGULANT  5 mg Oral BID     diltiazem ER  120 mg Oral Daily     metoprolol tartrate  50 mg Oral BID     QUEtiapine  12.5 mg Oral BID     QUEtiapine  50 mg Oral At Bedtime

## 2018-12-24 NOTE — CONSULTS
Medication coverage check for [employer]. Patient has a full deductible remaining to meet. He is eligible for free trial first fill, but then next year his first month would cost over $400 out of pocket before he would have a somewhat reasonable copay (about $85 monthly).  Vania Peraza CphT  [facility] Discharge Pharmacy Liaison  Liaison Cell: [phone]

## 2018-12-24 NOTE — PLAN OF CARE
Discharge Planner OT   Patient plan for discharge: Memory Care TCU (family's plan)  Current status: Patient still with 1:1. Able to follow single step commands when he is agreeable, but often will refuse. Refused to get out of bed to walk to the bathroom for ADL. Shaved with electric shaver with repeated simple cues, but then needed Mod A for thoroughness as he leaves several spots unshaven and is not able to correct with cues. Brushed teeth and combed hair in similar fashion.  Barriers to return to prior living situation: Newly diagnosed dementia, his wife is unable to care for patient at home, unsteady gait  Recommendations for discharge: Memory care TCU. Patient should stop driving  Rationale for recommendations: Patient was living at home with his wife and was recently shoveling the driveway, taking long walks. He has had worsening mentation since last March per family, and is now needing significant cues and assist with all ADL and mobility. Prior to admit he was still driving.       Entered by: Selena Joel 12/24/2018 12:18 PM

## 2018-12-24 NOTE — CONSULTS
[facility] Psychiatric Consult Progress Note    Interval History:   Pt seen, chart reviewed, case discussed with nursing staff and treating clinicians.  Paul was assessed on the cardiac special care unit.  He has a sitter at bedside.  He was lying passively in bed, he seems confused, at times his answers are nonsensical, though he seems to know for the most part that he is in the hospital.  Nursing notes reflect some periods of agitation and poor sleep.  He was started on some quetiapine last Friday by Dr. Martinez.  He really cannot give me any meaningful history at this time.     Review of systems:   10 point Review of Systems completed by Dr. Kumar, and is  is negative other than noted in the HPI     Medications:       apixaban ANTICOAGULANT  5 mg Oral BID     diltiazem ER  120 mg Oral Daily     metoprolol tartrate  50 mg Oral BID     QUEtiapine  12.5 mg Oral BID     QUEtiapine  50 mg Oral At Bedtime     acetaminophen, acetaminophen, magnesium sulfate, magnesium sulfate, magnesium sulfate, melatonin, metoprolol, naloxone, Reason anticoagulant not prescribed for atrial fibrillation, OLANZapine, potassium chloride, potassium chloride with lidocaine, potassium chloride, potassium chloride, potassium chloride, prochlorperazine **OR** prochlorperazine **OR** prochlorperazine, QUEtiapine    Mental Status Examination:     Appearance:  dressed in hospital scrubs, appeared older than stated age and fatigued  Eye Contact:  poor   Speech:  mumbling  Language:poor  Psychomotor Behavior:  no evidence of tardive dyskinesia, dystonia, or tics and fidgeting  Mood:  anxious  Affect:  intensity is flat  Thought Process:  disorganized no loose associations  Thought Content:  no evidence of suicidal ideation or homicidal ideation and no evidence of psychotic thought  Oriented to:  confused  Attention Span and Concentration:  poor  Recent and Remote Memory:  poor  Fund of Knowledge: delayed  Muscle Strength and  Tone: normal  Gait and Station: Normal  Insight:  limited  Judgment:  poor        Labs/Vitals:     Recent Results (from the past 24 hour(s))   UA with Microscopic reflex to Culture    Collection Time: 12/24/18  2:10 AM   Result Value Ref Range    Color Urine Yellow     Appearance Urine Clear     Glucose Urine Negative NEG^Negative mg/dL    Bilirubin Urine Negative NEG^Negative    Ketones Urine Negative NEG^Negative mg/dL    Specific Gravity Urine 1.021 1.003 - 1.035    Blood Urine Negative NEG^Negative    pH Urine 5.5 5.0 - 7.0 pH    Protein Albumin Urine 10 (A) NEG^Negative mg/dL    Urobilinogen mg/dL Normal 0.0 - 2.0 mg/dL    Nitrite Urine Negative NEG^Negative    Leukocyte Esterase Urine Negative NEG^Negative    Source Midstream Urine     WBC Urine 1 0 - 5 /HPF    RBC Urine 1 0 - 2 /HPF    Mucous Urine Present (A) NEG^Negative /LPF    Hyaline Casts 1 0 - 2 /LPF   Potassium    Collection Time: 12/24/18  6:49 AM   Result Value Ref Range    Potassium 4.0 3.4 - 5.3 mmol/L   Magnesium (AM Draw)    Collection Time: 12/24/18  6:49 AM   Result Value Ref Range    Magnesium 2.3 1.6 - 2.3 mg/dL     B/P: 129/100, T: 97.6, P: 103, R: 16    Impression:   Paul presents with a major neurocognitive disorder consistent with Alzheimer's dementia, superimposed delirium.  We will make some minor adjustments to quetiapine, dose at 8 PM, increase this to 50 mg to target sleep      DIagnoses:   1.  Delirium secondary to multiple medical comorbidities  2.  Major neurocognitive disorder, Alzheimer's type  3.Atrial fibrillation         Plan:   1. Written information given on medications. Side effects, risks, benefits reviewed.  2.  Adjust quetiapine to 12.5 mg twice daily, 50 mg nightly  3.  Agree he will need a TCU, however level of care after discharge  4.  Reconsult psychiatry as needed      Attestation:  Patient has been seen and evaluated by me,  Christopher Kumar MD

## 2018-12-24 NOTE — PROGRESS NOTES
SW:  D:  Received a call back from [facility].  They can clinically accept patent once he is sitter free for 24 hours.  Received a call back from [facility].  They have no available cognitive impairment beds.  They will follow up on Wednesday.  P:  Will continue to follow.

## 2018-12-25 ENCOUNTER — APPOINTMENT (OUTPATIENT)
Dept: CARDIOLOGY | Facility: CLINIC | Age: 83
DRG: 057 | End: 2018-12-25
Attending: INTERNAL MEDICINE
Payer: MEDICARE

## 2018-12-25 LAB
ANION GAP SERPL CALCULATED.3IONS-SCNC: 6 MMOL/L (ref 3–14)
BUN SERPL-MCNC: 27 MG/DL (ref 7–30)
CALCIUM SERPL-MCNC: 8.3 MG/DL (ref 8.5–10.1)
CHLORIDE SERPL-SCNC: 103 MMOL/L (ref 94–109)
CO2 SERPL-SCNC: 27 MMOL/L (ref 20–32)
CREAT SERPL-MCNC: 1.27 MG/DL (ref 0.66–1.25)
GFR SERPL CREATININE-BSD FRML MDRD: 52 ML/MIN/{1.73_M2}
GLUCOSE SERPL-MCNC: 76 MG/DL (ref 70–99)
POTASSIUM SERPL-SCNC: 3.6 MMOL/L (ref 3.4–5.3)
SODIUM SERPL-SCNC: 136 MMOL/L (ref 133–144)

## 2018-12-25 PROCEDURE — A9270 NON-COVERED ITEM OR SERVICE: HCPCS | Mod: GY | Performed by: PHYSICIAN ASSISTANT

## 2018-12-25 PROCEDURE — 25000132 ZZH RX MED GY IP 250 OP 250 PS 637: Mod: GY | Performed by: PSYCHIATRY & NEUROLOGY

## 2018-12-25 PROCEDURE — 40000264 ECHOCARDIOGRAM COMPLETE

## 2018-12-25 PROCEDURE — A9270 NON-COVERED ITEM OR SERVICE: HCPCS | Mod: GY | Performed by: INTERNAL MEDICINE

## 2018-12-25 PROCEDURE — 25000132 ZZH RX MED GY IP 250 OP 250 PS 637: Mod: GY | Performed by: HOSPITALIST

## 2018-12-25 PROCEDURE — A9270 NON-COVERED ITEM OR SERVICE: HCPCS | Mod: GY | Performed by: PSYCHIATRY & NEUROLOGY

## 2018-12-25 PROCEDURE — 25500064 ZZH RX 255 OP 636: Performed by: INTERNAL MEDICINE

## 2018-12-25 PROCEDURE — 80048 BASIC METABOLIC PNL TOTAL CA: CPT | Performed by: HOSPITALIST

## 2018-12-25 PROCEDURE — A9270 NON-COVERED ITEM OR SERVICE: HCPCS | Mod: GY | Performed by: HOSPITALIST

## 2018-12-25 PROCEDURE — 21000001 ZZH R&B HEART CARE

## 2018-12-25 PROCEDURE — 93306 TTE W/DOPPLER COMPLETE: CPT | Mod: 26 | Performed by: INTERNAL MEDICINE

## 2018-12-25 PROCEDURE — 25000132 ZZH RX MED GY IP 250 OP 250 PS 637: Mod: GY | Performed by: PHYSICIAN ASSISTANT

## 2018-12-25 PROCEDURE — 36415 COLL VENOUS BLD VENIPUNCTURE: CPT | Performed by: HOSPITALIST

## 2018-12-25 PROCEDURE — 25000132 ZZH RX MED GY IP 250 OP 250 PS 637: Mod: GY | Performed by: INTERNAL MEDICINE

## 2018-12-25 PROCEDURE — 99232 SBSQ HOSP IP/OBS MODERATE 35: CPT | Performed by: HOSPITALIST

## 2018-12-25 RX ORDER — METOPROLOL TARTRATE 25 MG/1
25 TABLET, FILM COATED ORAL 2 TIMES DAILY
Status: DISCONTINUED | OUTPATIENT
Start: 2018-12-25 | End: 2018-12-26

## 2018-12-25 RX ADMIN — Medication 12.5 MG: at 11:52

## 2018-12-25 RX ADMIN — Medication 12.5 MG: at 17:33

## 2018-12-25 RX ADMIN — QUETIAPINE 12.5 MG: 25 TABLET, FILM COATED ORAL at 08:40

## 2018-12-25 RX ADMIN — METOPROLOL TARTRATE 25 MG: 25 TABLET ORAL at 19:57

## 2018-12-25 RX ADMIN — QUETIAPINE 50 MG: 25 TABLET ORAL at 19:57

## 2018-12-25 RX ADMIN — METOPROLOL TARTRATE 50 MG: 50 TABLET, FILM COATED ORAL at 10:57

## 2018-12-25 RX ADMIN — QUETIAPINE 12.5 MG: 25 TABLET, FILM COATED ORAL at 14:49

## 2018-12-25 RX ADMIN — APIXABAN 5 MG: 5 TABLET, FILM COATED ORAL at 08:40

## 2018-12-25 RX ADMIN — APIXABAN 5 MG: 5 TABLET, FILM COATED ORAL at 19:56

## 2018-12-25 RX ADMIN — DILTIAZEM HYDROCHLORIDE 120 MG: 120 CAPSULE, EXTENDED RELEASE ORAL at 19:56

## 2018-12-25 RX ADMIN — Medication 1 MG: at 21:41

## 2018-12-25 RX ADMIN — HUMAN ALBUMIN MICROSPHERES AND PERFLUTREN 9 ML: 10; .22 INJECTION, SOLUTION INTRAVENOUS at 13:15

## 2018-12-25 RX ADMIN — POTASSIUM CHLORIDE 20 MEQ: 1500 TABLET, EXTENDED RELEASE ORAL at 06:11

## 2018-12-25 ASSESSMENT — ACTIVITIES OF DAILY LIVING (ADL)
ADLS_ACUITY_SCORE: 23

## 2018-12-25 ASSESSMENT — MIFFLIN-ST. JEOR: SCORE: 1419.76

## 2018-12-25 NOTE — PROGRESS NOTES
SW:  D:  Call placed to update [facility] as to patient's status.  They can accept patient tomorrow.  P:  Will continue to follow.

## 2018-12-25 NOTE — PROGRESS NOTES
[facility]    Medicine Progress Note - Hospitalist Service       Date of Admission:  12/21/2018  Assessment & Plan  Paul Milligan is an 83-year-old male with past medical history of remote paroxysmal atrial fibrillation and subjective progressive cognitive decline as reported by daughter since March 2018.  He presented to the Emergency Department for evaluation of 3-day history of acute worsening confusion with associated tachycardia and identified to be in atrial fibrillation with RVR with possible underlying UTI.     AFib with RVR   - Heart Failure mild, unknown EF.- h/o paroxysmal afib. TSH nl.  BNP elevated with small right pleural effusion on CXR, and patchy infiltrate/possible edema, no SOB/hypoxia.    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    - Echo still pending , patient previously refusing but now calm   - CHADsVASc 3-4. (+/-CHF)  No evidence of stroke on MRI. Dr Herrera Discussed with patient's daughter, Velma that preventing stroke likely outweighs risk of bleed. Despite dementia, physically fit, no history of falls. Both daughters agree.   - Started eliquis 5 mg BID on 12/24.     Probable Dementia, Alzheimer's Type, New Diagnosis - Family reports a progressive decline over the past 6-7 months. Daughter staying with parents over the 3 days PTA and noted significant confusion overnight, and this is what prompted ER visit (not afib). Head CT showed pronounced atrophy within the hippocampus, MRI negative for stroke, shows small vessel  ischemic disease.   - Calm and cooperative on 12/24/18   - Appreciate psychiatry consult on 12/24/18. Increased seroquel to 50 mg HS with 12.5 mg BID during the day.   - Zyprexa IM available for emergency.   - Needs TCU at discharge.     Prolonged QTc reviewed EKG shows QT of 510 and due to afib , QTc may be overestimated. Dr york Discussed with EP cardiologist, Dr. Logan. We reviewed EKG. ; repeat EKG with qTc 480  - He agrees low risk, likely over-estimated due to afib. Continue psychotropic medications following on telemetry    Foul smelling urine -  - RN's reporting foul smell on 12/23/18. Repeat UA remains negative, appears dehydrated. No indication for UCx     Diet: Combination Diet Regular Diet Adult; Low Lactose Diet    DVT Prophylaxis: Pneumatic Compression Devices  Petersen Catheter: not present  Code Status: Full Code      Communication: Nurse updated family (daughter and wife) about disposition plan     Disposition Plan   Expected discharge: 1-2 days , recommended to transitional care unit once without sitter for 24 hours and bed available, HR remains controlled. . PT, OT, Social work consulted.     Entered: Nik Davis MD 12/25/2018, 10:56 AM       Nik Davis MD  Hospitalist Service  [facility]    ______________________________________________________________________    Interval History  HR overnight 12/14 noted in high 30s and then increases to 130s with activity; sitter discontinued last evening; appears calm and cooperative today.     Data reviewed today: I reviewed all medications, new labs and imaging results over the last 24 hours.    Physical Exam   Vital Signs: Temp: 97.9  F (36.6  C) Temp src: Oral BP: 98/56 Pulse: 67 Heart Rate: 58 Resp: 16 SpO2: 99 % O2 Device: None (Room air)    Weight: 142 lbs 3.2 oz  Constitutional:  NAD, sitting in chair  Neuropsyche:  Not oriented; cooperative and calm today.  Respiratory:  Breathing comfortably, good air exchange, no  wheezes, no crackles.   Cardiovascular:  Irregular rate and rhythm, tachy, no edema.  GI:  soft, NT/ND, BS normal   Skin/Integumen:  No acute rash, bruising or sign of bleeding.         Data   Recent Labs   Lab 12/25/18  0530 12/24/18  0649 12/23/18  0548 12/22/18  0533 12/21/18  2255 12/21/18  1644 12/21/18  1050   WBC  --   --   --   --   --   --  4.4   HGB  --   --   --   --   --   --  13.4   MCV  --   --   --   --   --   --  98   PLT  --   --   --   --   --   --  233     --  137 138  --   --  138   POTASSIUM 3.6 4.0 3.7 3.4  --   --  4.0   CHLORIDE 103  --  104 104  --   --  102   CO2 27  --  23 25  --   --  28   BUN 27  --   --  14  --   --  15   CR 1.27*  --  1.18 1.10  --   --  1.14   ANIONGAP 6  --   --  9  --   --  8   LILIANA 8.3*  --  8.3* 8.6  --   --  9.2   GLC 76  --  81 78  --   --  111*   TROPI  --   --   --   --  0.015 0.018 0.016     No results found for this or any previous visit (from the past 24 hour(s)).  Medications     Reason anticoagulant not prescribed for atrial fibrillation         apixaban ANTICOAGULANT  5 mg Oral BID     diltiazem ER  120 mg Oral Daily     metoprolol tartrate  50 mg Oral BID     QUEtiapine  12.5 mg Oral BID     QUEtiapine  50 mg Oral At Bedtime

## 2018-12-25 NOTE — PROGRESS NOTES
12 hour RN.  Transfers SBA -A1.  A/O to self.  Pt is confused.  Pt has been calm and cooperative all day.  VSS. Tele afib cvr at rest, but with ambulation HR ranges from 100s-130s.  Hospitalist updated.  Pt to have EP consult and Metoprolol dose was decreased.  Pt did finally agree to have echocardiogram.  Denies pain.  Pt voiding w/o difficutly.  TCU memory care at discharge when pt is ready.  Pt's daughter would like a private room and is willing to pay extra.

## 2018-12-25 NOTE — PROGRESS NOTES
12 hour RN.  Transfers SBA -A1.  A/O to self.  Pt is confused.  Pt has been calm and cooperative all day.  VSS. Tele afib cvr/rvr.  HR ranges from 80-100s.  Denies pain.  Pt voiding w/o difficutly.  Plan TBD.  TCU memory care at discharge.

## 2018-12-25 NOTE — PLAN OF CARE
Alert to self only, lethargic overnight after bedtime dose of 50 mg Seroquel. Pt confused, impulsive but redirectable. Tele: A fib w/ BBB. Pt initially tachycardiac w/ -110's w/ ambulation at beginning of shift. However once patient was sleeping HR 50-60's. Pt HR low at times, noted HR dropping into 30's as low as 34. Soft BP's overnight, pt asymptomatic and nonsustained. C/o chest tightness after ambulation in hallways, resolved spontaneously w/ rest. EKG done, no notable EKG changes. Pt declines intervention for pain. Up w/ 1 and gait belt d/t unsteady gait. Plan for echocardiogram and likely discharge to [facility].

## 2018-12-26 ENCOUNTER — APPOINTMENT (OUTPATIENT)
Dept: OCCUPATIONAL THERAPY | Facility: CLINIC | Age: 83
DRG: 057 | End: 2018-12-26
Payer: MEDICARE

## 2018-12-26 LAB
ANION GAP SERPL CALCULATED.3IONS-SCNC: 6 MMOL/L (ref 3–14)
BUN SERPL-MCNC: 24 MG/DL (ref 7–30)
CALCIUM SERPL-MCNC: 9.2 MG/DL (ref 8.5–10.1)
CHLORIDE SERPL-SCNC: 106 MMOL/L (ref 94–109)
CO2 SERPL-SCNC: 28 MMOL/L (ref 20–32)
CREAT SERPL-MCNC: 1.12 MG/DL (ref 0.66–1.25)
GFR SERPL CREATININE-BSD FRML MDRD: 61 ML/MIN/{1.73_M2}
GLUCOSE SERPL-MCNC: 94 MG/DL (ref 70–99)
POTASSIUM SERPL-SCNC: 3.9 MMOL/L (ref 3.4–5.3)
SODIUM SERPL-SCNC: 140 MMOL/L (ref 133–144)

## 2018-12-26 PROCEDURE — 25000132 ZZH RX MED GY IP 250 OP 250 PS 637: Mod: GY | Performed by: PSYCHIATRY & NEUROLOGY

## 2018-12-26 PROCEDURE — 99232 SBSQ HOSP IP/OBS MODERATE 35: CPT | Performed by: HOSPITALIST

## 2018-12-26 PROCEDURE — 25000132 ZZH RX MED GY IP 250 OP 250 PS 637: Mod: GY | Performed by: INTERNAL MEDICINE

## 2018-12-26 PROCEDURE — A9270 NON-COVERED ITEM OR SERVICE: HCPCS | Mod: GY | Performed by: HOSPITALIST

## 2018-12-26 PROCEDURE — A9270 NON-COVERED ITEM OR SERVICE: HCPCS | Mod: GY | Performed by: PSYCHIATRY & NEUROLOGY

## 2018-12-26 PROCEDURE — 36415 COLL VENOUS BLD VENIPUNCTURE: CPT | Performed by: HOSPITALIST

## 2018-12-26 PROCEDURE — 97530 THERAPEUTIC ACTIVITIES: CPT | Mod: GO | Performed by: OCCUPATIONAL THERAPIST

## 2018-12-26 PROCEDURE — 99207 ZZC APP CREDIT; MD BILLING SHARED VISIT: CPT | Performed by: NURSE PRACTITIONER

## 2018-12-26 PROCEDURE — 25000132 ZZH RX MED GY IP 250 OP 250 PS 637: Mod: GY | Performed by: HOSPITALIST

## 2018-12-26 PROCEDURE — 80048 BASIC METABOLIC PNL TOTAL CA: CPT | Performed by: HOSPITALIST

## 2018-12-26 PROCEDURE — 99221 1ST HOSP IP/OBS SF/LOW 40: CPT | Performed by: INTERNAL MEDICINE

## 2018-12-26 PROCEDURE — A9270 NON-COVERED ITEM OR SERVICE: HCPCS | Mod: GY | Performed by: INTERNAL MEDICINE

## 2018-12-26 PROCEDURE — 25000128 H RX IP 250 OP 636: Performed by: NURSE PRACTITIONER

## 2018-12-26 PROCEDURE — 40000133 ZZH STATISTIC OT WARD VISIT: Performed by: OCCUPATIONAL THERAPIST

## 2018-12-26 PROCEDURE — 25000128 H RX IP 250 OP 636: Performed by: INTERNAL MEDICINE

## 2018-12-26 PROCEDURE — 21000001 ZZH R&B HEART CARE

## 2018-12-26 RX ORDER — WATER 10 ML/10ML
INJECTION INTRAMUSCULAR; INTRAVENOUS; SUBCUTANEOUS
Status: DISPENSED
Start: 2018-12-26 | End: 2018-12-26

## 2018-12-26 RX ORDER — OLANZAPINE 10 MG/2ML
10 INJECTION, POWDER, FOR SOLUTION INTRAMUSCULAR ONCE
Status: COMPLETED | OUTPATIENT
Start: 2018-12-26 | End: 2018-12-26

## 2018-12-26 RX ADMIN — APIXABAN 5 MG: 5 TABLET, FILM COATED ORAL at 08:42

## 2018-12-26 RX ADMIN — Medication 12.5 MG: at 10:53

## 2018-12-26 RX ADMIN — OLANZAPINE 5 MG: 10 INJECTION, POWDER, FOR SOLUTION INTRAMUSCULAR at 05:21

## 2018-12-26 RX ADMIN — APIXABAN 5 MG: 5 TABLET, FILM COATED ORAL at 21:30

## 2018-12-26 RX ADMIN — Medication 12.5 MG: at 04:38

## 2018-12-26 RX ADMIN — POTASSIUM CHLORIDE 20 MEQ: 1500 TABLET, EXTENDED RELEASE ORAL at 12:07

## 2018-12-26 RX ADMIN — QUETIAPINE 12.5 MG: 25 TABLET, FILM COATED ORAL at 15:33

## 2018-12-26 RX ADMIN — OLANZAPINE 5 MG: 10 INJECTION, POWDER, FOR SOLUTION INTRAMUSCULAR at 12:48

## 2018-12-26 RX ADMIN — METOPROLOL TARTRATE 25 MG: 25 TABLET ORAL at 08:42

## 2018-12-26 RX ADMIN — QUETIAPINE 50 MG: 25 TABLET ORAL at 21:30

## 2018-12-26 RX ADMIN — OLANZAPINE 5 MG: 10 INJECTION, POWDER, FOR SOLUTION INTRAMUSCULAR at 05:19

## 2018-12-26 RX ADMIN — QUETIAPINE 12.5 MG: 25 TABLET, FILM COATED ORAL at 08:42

## 2018-12-26 RX ADMIN — Medication 12.5 MG: at 21:34

## 2018-12-26 RX ADMIN — DILTIAZEM HYDROCHLORIDE 120 MG: 120 CAPSULE, EXTENDED RELEASE ORAL at 21:31

## 2018-12-26 ASSESSMENT — ACTIVITIES OF DAILY LIVING (ADL)
ADLS_ACUITY_SCORE: 23

## 2018-12-26 ASSESSMENT — MIFFLIN-ST. JEOR: SCORE: 1419.76

## 2018-12-26 NOTE — PROGRESS NOTES
"Pt getting up out of bed, requested to leave to go home. Not redirectable, stated he has to leave to go home to wife. Suggested going for walk as part of plan to redirect energy, and pt consented. Pt refused gait belt, but unsteady on feet and said he was leaving. Refused to sit in wheelchair, walked over to CCU unit, and then attempted to open door to exit stair case. Code green called. Patient standing by stairway door, refused to sit in wheelchair but unsteady. With security, tried to get patient into wheelchair but he started shouting, yelling \"help me.\" Pt took a knee on ground, but we eventually managed to lift patient into wheelchair behind him. Once in wheelchair, pt quiet, stopped shouting. Pt wheeled back to room and lifted back to bed. Given IM olanzapine PRN.   "

## 2018-12-26 NOTE — PLAN OF CARE
"Patient alert to self. VSS. Calm and  slept until about 0400. Denies pain. Tele: Afib with CVR. Plan for EP consult. Sitter at bedside.  0430- Patient up and restless, anxious wanting to go home. Walked with patient in hallway and able to direct to room. PRN Seroquel given.    0500- Patient agitated, anxious, restless. Screaming in hallway stating, \"help! Take me to to [facility]!\" \"I need to leave.\" Unable to re-direct patient. 10 mg of IM Zyprexa given per NP. Code 21 called and security able to bring patient in room. Patient now sitting on the bed, sitter at bedside, will continue to monitor.   "

## 2018-12-26 NOTE — PLAN OF CARE
Discharge Planner OT   Patient plan for discharge: Family's plan is memory care TCU  Current status: Patient still with 1:1 attendent. More cooperative today but needing more physical assist. Needed Mod A to stand from chair and hand hold assist of 2 for ambulating approx 200' in the olivares with slow, shuffling steps. Answers questions with illogical speech at times.  Barriers to return to prior living situation: cognitive impairment, unsteady gait with high falls risk  Recommendations for discharge: Memory Care TCU  Rationale for recommendations: Patient has new dementia diagnosis with acute changes in mentation, behaviors, physical abilities.       Entered by: Selena Joel 12/26/2018 2:01 PM

## 2018-12-26 NOTE — PROGRESS NOTES
[facility]  RRT Note: CODE 21  12/26/2018   Time Called: 0520  Code Status: Full Code    Assessment & Plan   IMPRESSION & PLAN:  Code 21 for agitation, aggression, and yelling. Patient is here with dementia and alzheimer's. Security at the bedside and brought patient back to bed. No need for restraints currently.     INTERVENTIONS:  -- 10 mg IM zyprexa  -- redirection to room by security    Interval History     Paul Milligan is a 83 year old male who was admitted on 12/21/2018 for confusion.    Medical history significant for:  History reviewed. No pertinent past medical history.  Past Surgical History:   Procedure Laterality Date     ORTHOPEDIC SURGERY         Allergies   No Known Allergies    Physical Exam   Vital Signs with Ranges:  Temp:  [97.4  F (36.3  C)-98.1  F (36.7  C)] 97.5  F (36.4  C)  Pulse:  [] 123  Heart Rate:  [] 88  Resp:  [16] 16  BP: ()/() 125/91  SpO2:  [97 %-99 %] 99 %  I/O last 3 completed shifts:  In: 1300 [P.O.:1300]  Out: 200 [Urine:200]    Physical Exam   Constitutional: He appears distressed.   HENT:   Head: Normocephalic and atraumatic.   Eyes: Pupils are equal, round, and reactive to light.   Neck: Normal range of motion.   Cardiovascular: An irregularly irregular rhythm present.   Pulmonary/Chest: Effort normal and breath sounds normal.   Abdominal: Soft. He exhibits no distension.   Musculoskeletal: Normal range of motion.   Neurological: He is alert.   Skin: Skin is warm and dry. Capillary refill takes 2 to 3 seconds.   Psychiatric: His speech is normal. His mood appears anxious. His affect is angry and labile. He is agitated, aggressive, hyperactive and actively hallucinating. Thought content is paranoid and delusional. Cognition and memory are impaired. He expresses impulsivity and inappropriate judgment. He is inattentive.     Data     IMAGING: (X-ray/CT/MRI)   No results found for this or any previous visit (from the past 24  hour(s)).    CBC with Diff:  Recent Labs   Lab Test 12/21/18  1050   WBC 4.4   HGB 13.4   MCV 98         No results found for: RETICABSCT  No results found for: RETP    Lactic Acid:    No results found for: LACT        Comprehensive Metabolic Panel:  Recent Labs   Lab 12/25/18  0530 12/24/18  0649     --    POTASSIUM 3.6 4.0   CHLORIDE 103  --    CO2 27  --    ANIONGAP 6  --    GLC 76  --    BUN 27  --    CR 1.27*  --    GFRESTIMATED 52*  --    GFRESTBLACK 61  --    LILIANA 8.3*  --    MAG  --  2.3       UA:  Recent Labs   Lab 12/24/18  0210   COLOR Yellow   APPEARANCE Clear   URINEGLC Negative   URINEBILI Negative   URINEKETONE Negative   SG 1.021   UBLD Negative   URINEPH 5.5   PROTEIN 10*   NITRITE Negative   LEUKEST Negative   RBCU 1   WBCU 1       Time Spent on this Encounter   I spent 30 minutes on the unit/floor managing the care of Paul Milligan. 100% of my time was spent counseling the patient and/or coordinating care regarding services listed in this note.    PAUL Richard, CNP  Hospitalist - [facility]  Text Page  [phone])

## 2018-12-26 NOTE — PROVIDER NOTIFICATION
MD Notification    Notified Person: MD    Notified Person Name:  Susan    Notification Date/Time: 12:40 PM December 26, 2018    Notification Interaction: paged, via in person    Purpose of Notification: 3 code greens this morning/afternoon. Given PRN PO Seroquel x1 and IM Zyprexa x1.    Orders Received: MD will talk to SW and potentially reconsult psych.     Comments:

## 2018-12-26 NOTE — PLAN OF CARE
PT: Attempted PT session. Per discussion with RN, patient just received Seroquel due to restlessness. Will hold PT session this PM as pt's daughter is not present to assist with redirecting patient to ambulate in hallway. Per RN, patient has been up ambulating in room with assist of nursing.

## 2018-12-26 NOTE — PLAN OF CARE
"VSS, O2 sats 94% RA. Baseline disoriented to everything but self. Ambulates without difficulty. Denies problems. \"wants to get car keys and drive\", \"hoping my wife comes soon\". Lots of reassurance given and redirecting but patient is unable to retain memory. Taking scheduled seroquel and was given PRN melatonin for sleep at HS.  "

## 2018-12-26 NOTE — CONSULTS
[facility]    Cardiac Electrophysiology Consultation     Date of Admission:  12/21/2018  Date of Consult (When I saw the patient): 12/26/18    Assessment & Plan   Paul Milligan is a 83 year old male who was admitted on 12/21/2018. I was asked to see the patient for AFib of unknown duration. Worsening cognitive decline past few months but more acute with confusion and agitation last few days along with urinary frequency and urgency. ?h/o pAF. ECG shows AF with RVR at 142 bpm along with RBBB. dilt 15 mg iv given along with dilt sr 120 mg daily and metoprolol 25 mg bid. Rate noted in the 30's at midnight presumably when sleeping. Hr currently in 70's.    AF of unknown duration, asymptomatic although given cognitive deficit can't know for sure. Pt does have conduction disease manifested in RBBB. SVR noted at midnight reflection of that, AVN blockers and hightened vagal tone. Improving now since bb was reduced. I would lower bb even more. We can always titrate up. LVEF is a bit down, no comparison. Given dementia suggesting conservative medical therapy. Appears tolerating Eliquis. No indication for ppm at this point in time.        Eder Van Che    Code Status    Full Code    Primary Care Physician   Physician No Ref-Primary    Unable to obtain a history from the patient due to confusion    Past Medical History   I have reviewed this patient's medical history and updated it with pertinent information if needed.   History reviewed. No pertinent past medical history.    Past Surgical History   I have reviewed this patient's surgical history and updated it with pertinent information if needed.  Past Surgical History:   Procedure Laterality Date     ORTHOPEDIC SURGERY         Prior to Admission Medications   Prior to Admission Medications   Prescriptions Last Dose Informant Patient Reported? Taking?   Ascorbic Acid (VITAMIN C PO)  Daughter Yes Yes   Sig: Take by mouth daily   CALCIUM CARBONATE PO  Daughter  Yes Yes   Sig: Take by mouth daily   COENZYME Q10 PO  Daughter Yes Yes   Sig: Take by mouth daily   Cholecalciferol (VITAMIN D-3 PO)  Daughter Yes Yes   Sig: Take by mouth At Bedtime   Digestive Enzymes (PAPAYA ENZYME PO)  Daughter Yes Yes   Sig: Take by mouth daily   MAGNESIUM SULFATE PO  Daughter Yes Yes   Sig: Take by mouth 2 times daily   MELATONIN PO  Daughter Yes Yes   Sig: Take by mouth At Bedtime   Omega-3 Fatty Acids (FISH OIL PO)  Daughter Yes Yes   Sig: Take by mouth daily   Saw Palmetto, Serenoa repens, (SAW PALMETTO PO)  Daughter Yes Yes   Sig: Take by mouth daily   VALERIAN ROOT PO  Daughter Yes Yes   Sig: Take by mouth At Bedtime   VITAMIN E PO  Daughter Yes Yes   Sig: Take by mouth daily   ZINC SULFATE PO  Daughter Yes Yes   Sig: Take by mouth daily   aspirin (ASA) 325 MG EC tablet 12/20/2018 at Unknown time Daughter Yes Yes   Sig: Take 325 mg by mouth every 6 hours as needed for moderate pain   aspirin (ASA) 325 MG EC tablet 12/21/2018 at Unknown time Daughter Yes Yes   Sig: Take 325 mg by mouth daily   simethicone (GAS-X) 80 MG chewable tablet  Daughter Yes Yes   Sig: Take 80 mg by mouth as needed for flatulence or cramping   vitamin B complex with vitamin C (VITAMIN  B COMPLEX) tablet  Daughter Yes Yes   Sig: Take 1 tablet by mouth daily      Facility-Administered Medications: None     Allergies   No Known Allergies    Social History   I have reviewed this patient's social history and updated it with pertinent information if needed. Paul Milligan  reports that  has never smoked. he has never used smokeless tobacco. He reports that he does not drink alcohol or use drugs.    Family History   I have reviewed this patient's family history and updated it with pertinent information if needed.   No family history on file.    Review of Systems   Comprehensive review of systems was performed with pertinent positives and negatives listed in assessment and plan section.    Physical Exam   Temp: 97.4  F  (36.3  C) Temp src: Oral BP: (!) 129/91 Pulse: 70 Heart Rate: 73 Resp: 16 SpO2: 98 % O2 Device: None (Room air)    Vital Signs with Ranges  Temp:  [97.4  F (36.3  C)-98.1  F (36.7  C)] 97.4  F (36.3  C)  Pulse:  [] 70  Heart Rate:  [] 73  Resp:  [16] 16  BP: (108-129)/() 129/91  SpO2:  [97 %-99 %] 98 %  142 lbs 3.18 oz    Constitutional: awake  Eyes: Lids and lashes normal, pupils equal, round and reactive to light, extra ocular muscles intact, sclera clear, conjunctiva normal  ENT: Normocephalic, without obvious abnormality, atraumatic, sinuses nontender on palpation, external ears without lesions, oral pharynx with moist mucous membranes, tonsils without erythema or exudates, gums normal and good dentition.  Hematologic / Lymphatic: no cervical lymphadenopathy  Respiratory: No increased work of breathing, good air exchange, clear to auscultation bilaterally, no crackles or wheezing  Cardiovascular: irregularly irregular rhythm  GI: No scars, normal bowel sounds, soft, non-distended, non-tender, no masses palpated, no hepatosplenomegally  Skin: no bruising or bleeding  Musculoskeletal: There is no redness, warmth, or swelling of the joints.  Full range of motion noted.    Neurologic: Awake, alert,   Neuropsychiatric: General: normal, calm and normal eye contact    Data   I personally reviewed all recent ECGs and images.  Results for orders placed or performed during the hospital encounter of 18 (from the past 24 hour(s))   Echocardiogram Complete    Narrative    [id number]  [id number]  [id number]  [id number]^LORENZO^PARUL^E           [facility]  Echocardiography Laboratory  [address] [place] [address]        Name: MARCE BOO  MRN: [id number]  : 1935  Study Date: 2018 12:30 PM  Age: 83 yrs  Gender: Male  Patient Location: [facility]  Reason For Study: Afib  Ordering Physician: PARUL VENTURA  Performed By: Karla Nathan     BSA: 1.9 m2  Height: 74  in  Weight: 140 lb  HR: 77  BP: 136/81 mmHg  _____________________________________________________________________________  __        Procedure  Complete Portable Echo Adult. Contrast Optison.  _____________________________________________________________________________  __        Interpretation Summary     1. The left ventricle is normal in size. The visual ejection fraction is  estimated at 45-50%. There is borderline-mild global hypokinesia of the left  ventricle.  2. The right ventricle is normal in structure, function and size.  3. No valve disease.  4. The ascending aorta is Mildly dilated.  5. The IVC is dilated and fails to change with respiration, suggesting  elevated central venous pressure.     No previous echo for comparison.  _____________________________________________________________________________  __        Left Ventricle  The left ventricle is normal in size. There is normal left ventricular wall  thickness. The visual ejection fraction is estimated at 45-50%. Diastolic  function not assessed due to atrial fibrillation. There is borderline-mild  global hypokinesia of the left ventricle.     Right Ventricle  The right ventricle is normal in structure, function and size.     Atria  Normal left atrial size. Right atrial size is normal. There is no atrial shunt  seen.     Mitral Valve  The mitral valve leaflets appear thickened, but open well. There is trace  mitral regurgitation.        Tricuspid Valve  There is mild (1+) tricuspid regurgitation. The right ventricular systolic  pressure is approximated at 9.4 mmHg plus the right atrial pressure.     Aortic Valve  There is mild trileaflet aortic sclerosis. There is mild (1+) aortic  regurgitation. No aortic stenosis is present.     Pulmonic Valve  The pulmonic valve is normal in structure and function.     Vessels  The ascending aorta is Mildly dilated. The IVC is dilated and fails to change  with respiration, suggesting elevated central venous  pressure.     Pericardium  There is no pericardial effusion.        Rhythm  The rhythm was atrial fibrillation with wide QRS rhythm.  _____________________________________________________________________________  __  MMode/2D Measurements & Calculations     IVSd: 1.0 cm  LVIDd: 4.9 cm  LVIDs: 3.9 cm  LVPWd: 1.1 cm  FS: 20.4 %  LV mass(C)d: 195.0 grams  LV mass(C)dI: 104.4 grams/m2  Ao root diam: 4.0 cm  LA dimension: 4.4 cm  asc Aorta Diam: 4.0 cm  LA/Ao: 1.1  RWT: 0.44        Doppler Measurements & Calculations  MV E max samuel: 93.2 cm/sec  MV dec slope: 155.0 cm/sec2  MV dec time: 0.32 sec  PA acc time: 0.19 sec  TR max samuel: 151.2 cm/sec  TR max P.4 mmHg  E/E' av.3  Lateral E/e': 7.8     Medial E/e': 10.9           _____________________________________________________________________________  __           Report approved by: Billy Naylor 2018 01:35 PM      Basic metabolic panel   Result Value Ref Range    Sodium 140 133 - 144 mmol/L    Potassium 3.9 3.4 - 5.3 mmol/L    Chloride 106 94 - 109 mmol/L    Carbon Dioxide 28 20 - 32 mmol/L    Anion Gap 6 3 - 14 mmol/L    Glucose 94 70 - 99 mg/dL    Urea Nitrogen 24 7 - 30 mg/dL    Creatinine 1.12 0.66 - 1.25 mg/dL    GFR Estimate 61 >60 mL/min/[1.73_m2]    GFR Estimate If Black 71 >60 mL/min/[1.73_m2]    Calcium 9.2 8.5 - 10.1 mg/dL

## 2018-12-26 NOTE — PROGRESS NOTES
Pt walking in halls with nursing assistant due to restlessness, but escalated and attempted to leave unit. Not redirectable, when attempting to take back to room stormed off down hallway. Unstable gait. Called code green. Managed to get patient to sit in chair in hallway, pt refusing to go back to room. Eventually with persuasion of Darryl MCCOLLUM and security, went back to room and settled into chair. Given seroquel PRN PO. Pt more peaceful, will continue to monitor.

## 2018-12-26 NOTE — PROGRESS NOTES
SPIRITUAL HEALTH SERVICES Progress Note  [facility]    Visited pt Paul khushbu BHARDWAJ. Pt has dementia and was not able to have a conversation with pt.    I will re-visit him tomorrow with music.     Deb Palomo  Chaplain Resident

## 2018-12-27 ENCOUNTER — APPOINTMENT (OUTPATIENT)
Dept: CT IMAGING | Facility: CLINIC | Age: 83
DRG: 057 | End: 2018-12-27
Attending: NURSE PRACTITIONER
Payer: MEDICARE

## 2018-12-27 ENCOUNTER — APPOINTMENT (OUTPATIENT)
Dept: PHYSICAL THERAPY | Facility: CLINIC | Age: 83
DRG: 057 | End: 2018-12-27
Attending: INTERNAL MEDICINE
Payer: MEDICARE

## 2018-12-27 LAB
GLUCOSE BLDC GLUCOMTR-MCNC: 104 MG/DL (ref 70–99)
POTASSIUM SERPL-SCNC: 4.1 MMOL/L (ref 3.4–5.3)

## 2018-12-27 PROCEDURE — 84132 ASSAY OF SERUM POTASSIUM: CPT | Performed by: INTERNAL MEDICINE

## 2018-12-27 PROCEDURE — 97116 GAIT TRAINING THERAPY: CPT | Mod: GP

## 2018-12-27 PROCEDURE — 99232 SBSQ HOSP IP/OBS MODERATE 35: CPT | Performed by: HOSPITALIST

## 2018-12-27 PROCEDURE — A9270 NON-COVERED ITEM OR SERVICE: HCPCS | Mod: GY | Performed by: PSYCHIATRY & NEUROLOGY

## 2018-12-27 PROCEDURE — 25000132 ZZH RX MED GY IP 250 OP 250 PS 637: Mod: GY | Performed by: INTERNAL MEDICINE

## 2018-12-27 PROCEDURE — 00000146 ZZHCL STATISTIC GLUCOSE BY METER IP

## 2018-12-27 PROCEDURE — 25000132 ZZH RX MED GY IP 250 OP 250 PS 637: Mod: GY | Performed by: PSYCHIATRY & NEUROLOGY

## 2018-12-27 PROCEDURE — A9270 NON-COVERED ITEM OR SERVICE: HCPCS | Mod: GY | Performed by: INTERNAL MEDICINE

## 2018-12-27 PROCEDURE — 21000001 ZZH R&B HEART CARE

## 2018-12-27 PROCEDURE — 40000193 ZZH STATISTIC PT WARD VISIT

## 2018-12-27 PROCEDURE — 70450 CT HEAD/BRAIN W/O DYE: CPT

## 2018-12-27 PROCEDURE — 99231 SBSQ HOSP IP/OBS SF/LOW 25: CPT | Performed by: NURSE PRACTITIONER

## 2018-12-27 PROCEDURE — 99231 SBSQ HOSP IP/OBS SF/LOW 25: CPT | Performed by: INTERNAL MEDICINE

## 2018-12-27 RX ADMIN — APIXABAN 5 MG: 5 TABLET, FILM COATED ORAL at 19:00

## 2018-12-27 RX ADMIN — APIXABAN 5 MG: 5 TABLET, FILM COATED ORAL at 08:35

## 2018-12-27 RX ADMIN — Medication 12.5 MG: at 11:59

## 2018-12-27 RX ADMIN — DILTIAZEM HYDROCHLORIDE 120 MG: 120 CAPSULE, EXTENDED RELEASE ORAL at 19:00

## 2018-12-27 RX ADMIN — Medication 12.5 MG: at 08:35

## 2018-12-27 RX ADMIN — QUETIAPINE 12.5 MG: 25 TABLET, FILM COATED ORAL at 13:26

## 2018-12-27 RX ADMIN — QUETIAPINE 50 MG: 25 TABLET ORAL at 19:00

## 2018-12-27 RX ADMIN — QUETIAPINE 12.5 MG: 25 TABLET, FILM COATED ORAL at 08:35

## 2018-12-27 RX ADMIN — Medication 12.5 MG: at 21:47

## 2018-12-27 RX ADMIN — Medication 12.5 MG: at 19:01

## 2018-12-27 ASSESSMENT — ACTIVITIES OF DAILY LIVING (ADL)
ADLS_ACUITY_SCORE: 23
ADLS_ACUITY_SCORE: 23
ADLS_ACUITY_SCORE: 24
ADLS_ACUITY_SCORE: 23
ADLS_ACUITY_SCORE: 24
ADLS_ACUITY_SCORE: 23

## 2018-12-27 ASSESSMENT — MIFFLIN-ST. JEOR
SCORE: 1427.75
SCORE: 1431.75

## 2018-12-27 NOTE — PROGRESS NOTES
[facility]    Medicine Progress Note - Hospitalist Service       Date of Admission:  12/21/2018  Assessment & Plan  Paul Milligan is an 83-year-old male with past medical history of remote paroxysmal atrial fibrillation and subjective progressive cognitive decline as reported by daughter since March 2018.  He presented to the Emergency Department for evaluation of 3-day history of acute worsening confusion with associated tachycardia and identified to be in atrial fibrillation with RVR with possible underlying UTI.     AFib with RVR   Sinus Pause  - Heart Failure mild, EF 45 %.- h/o paroxysmal afib. TSH nl.  BNP elevated with small right pleural effusion on CXR, and patchy infiltrate/possible edema, no SOB/hypoxia.    - was Started metoprolol 50 mg BID at admission; Started diltiazem  mg daily on 12/23/18   - HR overnight 12/24 noted in high 30s and then increases to 130s with activity  - evaluated by EP; Given dementia suggesting conservative medical therapy; decreased metoprolol further to 12.5 mg po Bid  - 12/26 noted sinus pause 2.8 secs while sleeping; EP recommended to continue CCB and BB and Stop BB for longer pauses in waking hours (>3 seconds).   - Echo with EF 45 % and dilated IVC with some s/o fluid overload but clinically euvolumic  - Given lasix 20 mg daily x 2 days,> discharge further doses on 12/24/18;   - slightly worsening Cr 1.18---1.27---1.12; stabilizing off diuresis    - CHADsVASc 3-4. (+/-CHF)  No evidence of stroke on MRI. Dr Herrera Discussed with patient's daughter, Velma that preventing stroke likely outweighs risk of bleed. Despite dementia, physically fit, no history of falls. Both daughters agree.   - Started eliquis 5 mg BID on 12/24.     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 Daughter staying with parents over the 3 days PTA and noted significant confusion  overnight, and this is what prompted ER visit (not afib). Head CT showed pronounced atrophy within the hippocampus, MRI negative for stroke, shows small vessel ischemic disease.   - intermittently agitated; Code Green was called 12/26, required IM Zyprexa  - Appreciate psychiatry consult on 12/24/18. Increased seroquel to 50 mg HS with 12.5 mg BID during the day.  - reconsulted psych-- to re-eval 12/27  - Zyprexa IM available for emergency.   - Therapies recommend Memory Care TCU   - will discontinue sitter 12/27 as has been calm this am and will monitor--- needs to be off sitter for >24 hrs before he can go to a Memory care Unit    Prolonged QTc reviewed EKG shows QT of 510 and due to afib , QTc may be overestimated. Dr york Discussed with EP cardiologist, Dr. Logan. We reviewed EKG. ; repeat EKG with qTc 480    Foul smelling urine -  - RN's reporting foul smell on 12/23/18. Repeat UA remains negative, appears dehydrated. No indication for UCx     Diet: Combination Diet Regular Diet Adult; Low Lactose Diet    DVT Prophylaxis: Pneumatic Compression Devices  Petersen Catheter: not present  Code Status: Full Code      Communication: discussed care plan with his daughter 12/26 and answered several questions she had; also discussed with care coordinator Alejandra     Disposition Plan   Expected discharge: pending placement;  following for disposition; recommended to transitional care unit once off sitter for >24 hrs.    Entered: Nik Davis MD 12/27/2018, 11:24 AM       Nik Davis MD  Hospitalist Service  [facility]    ______________________________________________________________________    Interval History    - overngiht noted with sinus pauses, longest 2.8 seconds;  intermittent agitation but didn't have much behavior issues overnight; calm this am    Data reviewed today: I reviewed all medications, new labs and imaging results over the last 24 hours.    Physical Exam    Vital Signs: Temp: 97.4  F (36.3  C) Temp src: Oral BP: 115/70 Pulse: 121 Heart Rate: 67 Resp: 18 SpO2: 98 % O2 Device: None (Room air)    Weight: 143 lbs 15.37 oz  Constitutional:  NAD, sitting in chair, alert awake but not oriented  Neuropsyche:  Not oriented; cooperative and calm sitting in chair   Respiratory:  Breathing comfortably, good air exchange, no wheezes, no crackles.   Cardiovascular:  Irregular rate and rhythm, tachy, no edema.  GI:  soft, NT/ND, BS normal   Skin/Integumen:  No acute rash or bruising        Data   Recent Labs   Lab 12/27/18  0632 12/26/18  0828 12/25/18  0530  12/23/18  0548 12/22/18  0533 12/21/18  2255 12/21/18  1644 12/21/18  1050   WBC  --   --   --   --   --   --   --   --  4.4   HGB  --   --   --   --   --   --   --   --  13.4   MCV  --   --   --   --   --   --   --   --  98   PLT  --   --   --   --   --   --   --   --  233   NA  --  140 136  --  137 138  --   --  138   POTASSIUM 4.1 3.9 3.6   < > 3.7 3.4  --   --  4.0   CHLORIDE  --  106 103  --  104 104  --   --  102   CO2  --  28 27  --  23 25  --   --  28   BUN  --  24 27  --   --  14  --   --  15   CR  --  1.12 1.27*  --  1.18 1.10  --   --  1.14   ANIONGAP  --  6 6  --   --  9  --   --  8   LILIANA  --  9.2 8.3*  --  8.3* 8.6  --   --  9.2   GLC  --  94 76  --  81 78  --   --  111*   TROPI  --   --   --   --   --   --  0.015 0.018 0.016    < > = values in this interval not displayed.     No results found for this or any previous visit (from the past 24 hour(s)).  Medications     Reason anticoagulant not prescribed for atrial fibrillation         apixaban ANTICOAGULANT  5 mg Oral BID     diltiazem ER  120 mg Oral Daily     metoprolol tartrate  12.5 mg Oral BID     QUEtiapine  12.5 mg Oral BID     QUEtiapine  50 mg Oral At Bedtime

## 2018-12-27 NOTE — PROGRESS NOTES
"SPIRITUAL HEALTH SERVICES Progress Note  [facility] [place] [place]    Follow up visit with pt Paul. Pt was agitated and yelling at his sitter, and  entered into the room and tried to calm him down.  Pt expressed his emotions of frustration and angry, and  confirmed his emotions and provided emotional support.  Pt was mumbling and pointed out his family picture.  showed the pictures to pt and said, \"You have a beautiful family.     provided presence in care and listening, and emotional support.    I and SH remain available per pt/family request.       Deb Palomo  Chaplain Resident    "

## 2018-12-27 NOTE — PLAN OF CARE
[id number]. Confused, alert to self. Restless and hyperactive today. Bedside attendant. Denied pain. Tele: A-fib CVR. Psych re-consulted. Plan for discharge to TCU memory care.

## 2018-12-27 NOTE — PLAN OF CARE
PT:  Discharge Planner PT   Patient plan for discharge: Did not state  Current status: Pt required CGA sit to/from stand, CGA for gait of 450 ft with HHA with dec in balance noted, more unsteady when amb slowly and is steadier with a faster pace.   Barriers to return to prior living situation: Falls risk, needs assist with mobility  Recommendations for discharge: Memory care TCU  Rationale for recommendations: Pt would benefit from continued PT to improve strength, balance, mobility to increase independence, reduce falls risk and increase safety before returning home. Wife unable to care for patient in his current state.       Entered by: Alyssia Bernardo 12/27/2018 10:27 AM

## 2018-12-27 NOTE — PROGRESS NOTES
12 hour RN.  Transfers SBA -A1 with gait belt.  A/O to self.  Pt is confused.  Pt has been calm and cooperative all day.  VSS. Tele afib cvr at rest and with activity HR ranges from 80-120s.  EP following.  Denies pain.  Pt voiding w/o difficutly.  TCU memory care at discharge when pt is ready.

## 2018-12-27 NOTE — PROGRESS NOTES
[facility]  EP Cardiology Progress Note  Date of Service: 12/27/2018  Primary Cardiologist: Dr. Che    Assessment & Plan    Paul Milligan is a 83 year old male with past medical history significant for waxing and waning cognitive issues   admitted on 12/21/2018 for confusion, possible UTI and found to be in atrial fibrillation with RVR.   EP was asked to see patient due to atrial fibrillation of unknown duration.   He was started on diltiazem IV along with diltiazem  mg daily and metoprolol 25mg twice daily. His heart rate was noted in the 30's at night presumably when sleeping and during the day in 70's therefore his AV node blockers were decreased. His EKG reviewed by Dr. Che AF with RVR at 142 bpm along with RBBB suggesting a conduction disease  His ECHO (12/25/2018) revealed EF 45-50% with mildly dilated ascending aorta, with dilated IVC.       Interval History  VS reviewed and HR has been  bpm, afebrile, -126/64-89, oxygen % on room air.  His weight has been stable.  His I &O reveal he is up 2.3 L since admission and down 0.25 L on 12/26.  Smart disclosure reviewed and the longest pause was 2.8 seconds at 8:30 on 12/27.  He is oriented x 1 and is being fed by staff this morning.        Assessment/Plan:  1.  Atrial fibrillation  Atrial fibrillation of unknown time frame.   His ECHO show reduced EF of 45% and dilated IVC.  On exam he is euvolemic.   Originally, his diltiazem was 120 mg daily and metoprolol was 25 mg BID and were decreased on 12/27. Over the past 24 hours, his heart rates have been 60s-110s with the longest pause was 2.8 seconds at 8:30 on 12/27.  Plan:    Continue anticoagulation for a CHADS VASC 2 (age++) continue Eliquis 5 mg twice daily.   He qualifies for his based on weight of 64 kg and normal creatinine.   If his weight decreases below 60 kg recommend decreasing Eliquis to 2.5 mg twice daily.      Continue diltiazem 120 mg daily     Continue  metoprolol tartrate 12.5 mg twice daily       Alicia Salazar, APRN CNP  P Heart  Text Page  (M-F 7:30 am - 4:00 pm)      Physical Exam   Temp: 97.4  F (36.3  C) Temp src: Oral BP: 115/70 Pulse: 121 Heart Rate: 67 Resp: 18 SpO2: 98 % O2 Device: None (Room air)    Vitals:    12/26/18 0500 12/27/18 0626 12/27/18 0627   Weight: 64.5 kg (142 lb 3.2 oz) 65.7 kg (144 lb 13.5 oz) 65.3 kg (143 lb 15.4 oz)       GEN:  In general, this is thin frail elderly male in no acute distress.  Alert x 1  HEENT:  Pupils normal size, equal, and round. Sclerae nonicteric.   NECK:  Trachea midline.   C/V:  Irregular irregularly rhythm, no murmur, rub or gallop. No S3 or RV heave.   RESP: Respirations are unlabored. No use of accessory muscles. Clear to auscultation bilaterally without wheezing, rales, or rhonchi.  GI: Abdomen soft, nontender, nondistended. No hepatosplenomegaly appreciated. No masses palpable, bowel sounds normal.  EXTREM: no LE edema. No cyanosis or clubbing.  VASC: Radial and dorsalis pedis are normal in volumes and symmetric bilaterally.   NEURO: Alert x 1   PSYCH: flat affect  SKIN: Warm and dry.    Medications     Reason anticoagulant not prescribed for atrial fibrillation         apixaban ANTICOAGULANT  5 mg Oral BID     diltiazem ER  120 mg Oral Daily     metoprolol tartrate  12.5 mg Oral BID     QUEtiapine  12.5 mg Oral BID     QUEtiapine  50 mg Oral At Bedtime       Data   Most Recent 3 CBC's:  Recent Labs   Lab Test 12/21/18  1050   WBC 4.4   HGB 13.4   MCV 98        Most Recent 3 BMP's:  Recent Labs   Lab Test 12/27/18  0632 12/26/18  0828 12/25/18  0530  12/23/18  0548 12/22/18  0533   NA  --  140 136  --  137 138   POTASSIUM 4.1 3.9 3.6   < > 3.7 3.4   CHLORIDE  --  106 103  --  104 104   CO2  --  28 27  --  23 25   BUN  --  24 27  --   --  14   CR  --  1.12 1.27*  --  1.18 1.10   ANIONGAP  --  6 6  --   --  9   LILIANA  --  9.2 8.3*  --  8.3* 8.6   GLC  --  94 76  --  81 78    < > = values in this  interval not displayed.     Most Recent 3 BNP's:  Recent Labs   Lab Test 12/21/18  1050   NTBNPI 2,345*     Most Recent TSH and T4:  Recent Labs   Lab Test 12/21/18  1644   TSH 2.78     Last 24 hours labs reviewed

## 2018-12-27 NOTE — PROGRESS NOTES
"BRIEF NUTRITION ASSESSMENT      REASON FOR ASSESSMENT:  LOS      CURRENT DIET AND INTAKE:  Diet:  Regular, low lactose, no MSG              Chart reviewed  Note pt with dementia - plan memory care TCU at discharge    He has been ordering 3 full meals per day  Flowsheets reflect intake of 100%  Stopped by to see pt - he tells me that he ate lunch  CNA in room notes that he has been eating all of his meals - \"he is a fast eater!\"    ANTHROPOMETRICS:  Height: 6'2\"  Weight: (12/27) 65.3 kg  BMI: 18.4 kg/m2  IBW:  86.4 kg  Weight Status: Underweight BMI <18.5  %IBW: 76%  Weight History:   Wt Readings from Last 10 Encounters:   12/27/18 65.3 kg (143 lb 15.4 oz)         LABS:  Labs noted    MALNUTRITION:  Unable to assess  Pt appears thin, but do not have a wt history or diet history      NUTRITION INTERVENTION:  Nutrition Diagnosis:  No nutrition diagnosis at this time.    Implementation:  Nutrition Education --->  not appropriate at this time due to patient condition      FOLLOW UP/MONITORING:   Will re-evaluate in 7 - 10 days, or sooner, if re-consulted.          "

## 2018-12-28 ENCOUNTER — APPOINTMENT (OUTPATIENT)
Dept: PHYSICAL THERAPY | Facility: CLINIC | Age: 83
DRG: 057 | End: 2018-12-28
Payer: MEDICARE

## 2018-12-28 ENCOUNTER — APPOINTMENT (OUTPATIENT)
Dept: OCCUPATIONAL THERAPY | Facility: CLINIC | Age: 83
DRG: 057 | End: 2018-12-28
Payer: MEDICARE

## 2018-12-28 VITALS
SYSTOLIC BLOOD PRESSURE: 107 MMHG | DIASTOLIC BLOOD PRESSURE: 64 MMHG | OXYGEN SATURATION: 100 % | BODY MASS INDEX: 18.48 KG/M2 | WEIGHT: 143.96 LBS | HEIGHT: 74 IN | RESPIRATION RATE: 18 BRPM | HEART RATE: 138 BPM | TEMPERATURE: 97.3 F

## 2018-12-28 LAB
INTERPRETATION ECG - MUSE: NORMAL
INTERPRETATION ECG - MUSE: NORMAL

## 2018-12-28 PROCEDURE — 25000132 ZZH RX MED GY IP 250 OP 250 PS 637: Mod: GY | Performed by: INTERNAL MEDICINE

## 2018-12-28 PROCEDURE — A9270 NON-COVERED ITEM OR SERVICE: HCPCS | Mod: GY | Performed by: INTERNAL MEDICINE

## 2018-12-28 PROCEDURE — 40000193 ZZH STATISTIC PT WARD VISIT: Performed by: PHYSICAL THERAPIST

## 2018-12-28 PROCEDURE — 97110 THERAPEUTIC EXERCISES: CPT | Mod: GP | Performed by: PHYSICAL THERAPIST

## 2018-12-28 PROCEDURE — 99239 HOSP IP/OBS DSCHRG MGMT >30: CPT | Performed by: HOSPITALIST

## 2018-12-28 PROCEDURE — 97535 SELF CARE MNGMENT TRAINING: CPT | Mod: GO | Performed by: OCCUPATIONAL THERAPIST

## 2018-12-28 PROCEDURE — A9270 NON-COVERED ITEM OR SERVICE: HCPCS | Mod: GY | Performed by: PSYCHIATRY & NEUROLOGY

## 2018-12-28 PROCEDURE — 25000132 ZZH RX MED GY IP 250 OP 250 PS 637: Mod: GY | Performed by: PSYCHIATRY & NEUROLOGY

## 2018-12-28 PROCEDURE — 40000133 ZZH STATISTIC OT WARD VISIT: Performed by: OCCUPATIONAL THERAPIST

## 2018-12-28 PROCEDURE — 97116 GAIT TRAINING THERAPY: CPT | Mod: GP | Performed by: PHYSICAL THERAPIST

## 2018-12-28 RX ORDER — DILTIAZEM HYDROCHLORIDE 120 MG/1
120 CAPSULE, EXTENDED RELEASE ORAL DAILY
Qty: 30 CAPSULE | Refills: 0 | DISCHARGE
Start: 2018-12-28 | End: 2019-02-04

## 2018-12-28 RX ORDER — LANOLIN ALCOHOL/MO/W.PET/CERES
3 CREAM (GRAM) TOPICAL AT BEDTIME
Status: DISCONTINUED | OUTPATIENT
Start: 2018-12-28 | End: 2018-12-28 | Stop reason: HOSPADM

## 2018-12-28 RX ORDER — QUETIAPINE FUMARATE 25 MG/1
25 TABLET, FILM COATED ORAL AT BEDTIME
Qty: 30 TABLET | Refills: 0 | DISCHARGE
Start: 2018-12-28 | End: 2019-01-28

## 2018-12-28 RX ORDER — METOPROLOL TARTRATE 25 MG/1
12.5 TABLET, FILM COATED ORAL 2 TIMES DAILY
Qty: 30 TABLET | Refills: 0 | DISCHARGE
Start: 2018-12-28 | End: 2019-01-28

## 2018-12-28 RX ORDER — QUETIAPINE FUMARATE 25 MG/1
12.5 TABLET, FILM COATED ORAL 2 TIMES DAILY
Qty: 30 TABLET | Refills: 0 | DISCHARGE
Start: 2018-12-28 | End: 2019-01-28

## 2018-12-28 RX ORDER — LANOLIN ALCOHOL/MO/W.PET/CERES
3 CREAM (GRAM) TOPICAL AT BEDTIME
Qty: 30 TABLET | Refills: 0 | DISCHARGE
Start: 2018-12-28 | End: 2019-02-04

## 2018-12-28 RX ORDER — QUETIAPINE FUMARATE 25 MG/1
25 TABLET, FILM COATED ORAL AT BEDTIME
Status: DISCONTINUED | OUTPATIENT
Start: 2018-12-28 | End: 2018-12-28 | Stop reason: HOSPADM

## 2018-12-28 RX ORDER — QUETIAPINE FUMARATE 25 MG/1
12.5 TABLET, FILM COATED ORAL 3 TIMES DAILY PRN
DISCHARGE
Start: 2018-12-28 | End: 2019-01-28

## 2018-12-28 RX ADMIN — QUETIAPINE 12.5 MG: 25 TABLET, FILM COATED ORAL at 08:52

## 2018-12-28 RX ADMIN — APIXABAN 5 MG: 5 TABLET, FILM COATED ORAL at 08:52

## 2018-12-28 RX ADMIN — Medication 12.5 MG: at 08:52

## 2018-12-28 ASSESSMENT — ACTIVITIES OF DAILY LIVING (ADL)
ADLS_ACUITY_SCORE: 23

## 2018-12-28 NOTE — PLAN OF CARE
Patient to discharge to Memory Care at [facility] via stretcher. Daughter here at bedside. Paperwork packet sent with patient.

## 2018-12-28 NOTE — PLAN OF CARE
Physical Therapy Discharge Summary    Reason for therapy discharge:    Discharged to Memory care TCU     Progress towards therapy goal(s). See goals on Care Plan in [facility] electronic health record for goal details.  Goals partially met.  Barriers to achieving goals:   limited tolerance for therapy and discharge from facility.    Therapy recommendation(s):    Continued therapy is recommended.  Rationale/Recommendations:  TCU to maximize return to PLOF.

## 2018-12-28 NOTE — PROGRESS NOTES
SW:  D:  Received discharge orders for patient.  Bed available at [facility] for today.  Patient will need stretcher transport as he is confused and is not safe to be alone in the back of the rig.  Call placed to [facility] to arrange for stretcher transport at 13:00 today.  Faxed the facesheet and PCS to [facility].  Call placed to update patient's daughter and she is in agreement to the plan and confirmed that patient will have a private room with no extra charge.  Also explained that they will try and have a room with a courtyard view for patient.  Call placed to update [facility] and faxed the orders and the PAS.    PAS-RR    D: Per DHS regulation, SW completed and submitted PAS-RR to [place] [employer] Direct Connect via the Senior LinkAge Line.  PAS-RR confirmation # is : [id number].    I: SW spoke with patient's daughter Velma and they are aware a PAS-RR has been submitted.  SW reviewed with kolton Carreon that they may be contacted for a follow up appointment within 10 days of hospital discharge if their SNF stay is < 30 days.  Contact information for [facility] LinkAge Line was also provided.    A: Kolton Carreon verbalized understanding.    P: Further questions may be directed to [facility] LinkAge Line at #[phone], option #4 for PAS-RR staff.

## 2018-12-28 NOTE — PLAN OF CARE
Discharge Planner PT   Patient plan for discharge: Memory care TCU per chart  Current status: PT: Patient noted to have a fall last night without apparent injury; Flat affect this am; slower to follow commands; sitting at EOB with nursing assistant present; sit>stand with min A and unsteadiness; encouragement to come to stand; able to ambulate 150 feet this am but needed min A/HHA on L and SBA of nursing assistant for safety as staff reports he appears weaker this am. Some SOB noted and very slow gait speed initially with lateral path deviations but no gross LOB. Able to transfer to bedside chair with min A and extra cues to get patient to sit down. Chair alarm placed for patient safety; Able to participate in seated ex's for strengthening and circulatory benefits.  Barriers to return to prior living situation: falls risk; current level of assist; impaired cognition; wife unable to provide current cares needed  Recommendations for discharge: TCU/Memory care TCU  Rationale for recommendations: Pt would benefit from continued PT to improve strength, balance, mobility to increase independence, reduce falls risk and increase safety before returning home. Wife unable to care for patient in his current state         Entered by: Sherine Velasco 12/28/2018 8:50 AM

## 2018-12-28 NOTE — PROGRESS NOTES
This writer promptly responded to bed alarm. Pt already on knees in between bed rails on the floor. Assigned nurse and aid present. Vital signs taken. C/o R knee pain. House MD notified.

## 2018-12-28 NOTE — PLAN OF CARE
Occupational Therapy Discharge Summary    Reason for therapy discharge:    Discharged to Memory care TCU     Progress towards therapy goal(s). [facility] for goal details.  Goals not met.  Barriers to achieving goals:   limited tolerance for therapy and discharge from facility.    Therapy recommendation(s):    Continued therapy is recommended.  Rationale/Recommendations:  Continue OT at [facility] TCU for continued cognitive intervention/evaluation, ADL, to determine if able to return to community living at home, or if LTC needed.

## 2018-12-28 NOTE — PROGRESS NOTES
[facility]    House USAMA Note  12/27/2018   Time Called: 2007    House USAMA called for: Fall    Assessment & Plan     Unwitnessed, suspected mechanical fall without obvious injury noted:   - Upon arrival, pt lying in bed in no apparent distress, awake, alert, oriented to self.  CN II-XII intact, PERRL, equal, symmetrical extremity movement and strength.  Per nursing, pt's bed alarm sounding, nursing immediately entered pt's room to find pt on bilateral knees on the floor nearly parallel to bed, between 2 bed rails, leaning his head on his hand which his arm was leaning on the bed.  Given pt's baseline neurocognitive disorder, unable to recall if hit his head; on Eliquis.  Pt moving his cervical spine without difficulty, full ROM noted, no cervical tenderness noted to palpation.  No obvious head trauma noted; noted bony spur like structure on occipital portion of skull, no surrounding ecchymoses/hematoma/abrasions.  VSS, HR 100s-130s, a-fib (baseline telemetry), SBP 110s, RR 10s, O2 sats 97% on RA, afebrile.     Given unwitnessed fall on anticoagulation, will obtain stat CT head without contrast.         INTERVENTIONS:  - Maintain fall precautions  - Long arm bedside attendant continued  - BG - 104  - Stat CT head without contrast    Discussed with and defer further cares to nursing and hospitalist.    Interval History     Paul Milligan is a 83 year old male who was admitted on 12/21/2018 for worsening confusion, found to have a-fib RVR.    Medical history significant for: Paroxysmal a-fib    Code Status: Full Code    Gino Mcgee, APRN, CNP  House USAMA    Allergies   No Known Allergies    Physical Exam   Vital Signs with Ranges:  Temp:  [97.4  F (36.3  C)-98.2  F (36.8  C)] 98.2  F (36.8  C)  Pulse:  [] 98  Heart Rate:  [] 136  Resp:  [16-18] 18  BP: (101-150)/() 116/89  SpO2:  [96 %-98 %] 97 %  I/O last 3 completed shifts:  In: 840 [P.O.:840]  Out: 600  [Urine:600]    Constitutional: Pt lying in bed, supine, awake, alert, in no apparent distress  Neck: Full ROM noted, no cervical tenderness noted to palpation  Pulmonary: In no apparent distress  Cardiovascular: Appears well perfused  GI: Flat  Skin/Integumen: Warm, dry, no ecchymoses/hematomas/abrasions noted  Neuro: Alert, CN II-XII intact, PERRL, equal symmetrical strength noted in bilateral extremities  Psych:  Calm  Extremities: Moving all extremities without difficulty    Data    IMAGING: (X-ray/CT/MRI)   No results found for this or any previous visit (from the past 24 hour(s)).    Time Spent on this Encounter   I spent 10 minutes on the unit/floor managing the care of Paul Milligan. Over 50% of my time was spent counseling the patient and/or coordinating care regarding services listed in this note.

## 2018-12-28 NOTE — PLAN OF CARE
Discharge Planner OT   Patient plan for discharge: Memory care TCU  Current status: Noted that patient fell overnight. Patient calm and fairly cooperative, but needing assist with mobility and ADL. Min A and cues to transfer, Min A to ambulate in and out of bathroom. Stood at sink to brush teeth with cues and CGA. Followed single step commands approx 75% of trials. With 1:1 attendant and chair alarm at end of session.  Barriers to return to prior living situation: Cognitive impairment, falls risk, needs assist with all ADL and mobility.  Recommendations for discharge: Memory Care TCU  Rationale for recommendations: Patient with newly diagnosed dementia. Was living at home with his wife and driving, but with worsening mentation. His wife is not able to care for him at this time and he has new mobility limitations. (Was shoveling driveway and taking longs walks recently per daughter.)       Entered by: Selena Joel 12/28/2018 10:02 AM

## 2018-12-28 NOTE — PROGRESS NOTES
Writer was called into room to find pt kneeling on floor next to bed with arms resting on bed and head in arms. Pt attempted to get out of bed and set off alarm. Alarm was set with 3 side rails up and long arm sit out of sight of pt. Pt was assisted back to bed. House dr came to assess. Pt reports no pain. No signs of injury noted. Sit is now outside of door with 3 side rails up and bed alarm on. Family was notified of incident.     Pt was sent for head CT. Now back in room.

## 2018-12-28 NOTE — DISCHARGE SUMMARY
[facility]  Discharge Summary        Paul Milligan MRN# [id number]   YOB: 1935 Age: 83 year old     Date of Admission:  12/21/2018  Date of Discharge:  12/28/2018  Admitting Physician:  Miladis Herrera MD  Discharge Physician: Nik Davis MD  Discharging Service: Hospitalist     Primary Provider: No Ref-Primary, Physician  Primary Care Physician Phone Number: None         Discharge Diagnoses/Problem Oriented Hospital Course (Providers):    Paul Milligan was admitted on 12/21/2018 by Miladis Herrera MD and I would refer you to their history and physical.  The following problems were addressed during his hospitalization:    Paul Milligan is an 83-year-old male with past medical history of remote paroxysmal atrial fibrillation and subjective progressive cognitive decline as reported by daughter since March 2018.  He presented to the Emergency Department for evaluation of 3-day history of acute worsening confusion with associated tachycardia and identified to be in atrial fibrillation with RVR with possible underlying UTI.      # AFib with RVR   # Sinus Pause  - Heart Failure mild, EF 45 %.- h/o paroxysmal afib. TSH nl.  BNP elevated with small right pleural effusion on CXR, and patchy infiltrate/possible edema, no SOB/hypoxia.    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   - Echo with EF 45 % and dilated IVC with some s/o fluid overload but clinically euvolumic  - received couple doses of Iv lasix intermittently  - slightly worsening Cr 1.18---1.27---1.12; stabilizing off diuresis    - CHADsVASc 3-4. (+/-CHF)  No evidence of stroke on MRI. Dr Herrera Discussed with patient's daughter, Velma that preventing stroke  likely outweighs risk of bleed. Despite dementia, physically fit, no history of falls. Both daughters agree.   - Started eliquis 5 mg BID on 12/24.      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 Daughter staying with parents over the 3 days PTA and noted significant confusion overnight, and this is what prompted ER visit (not afib). Head CT showed pronounced atrophy within the hippocampus, MRI negative for stroke, shows small vessel ischemic disease.   - intermittently agitated; required IM Zyprexa  - was evaluated by psychiatry and started on seroquel, melatonin   - Therapies recommend Memory Care TCU      Prolonged QTc reviewed EKG shows QT of 510 and due to afib , QTc may be overestimated. Dr york Discussed with EP cardiologist, Dr. Logan. We reviewed EKG. ; repeat EKG with qTc 480     Foul smelling urine -  - RN's reporting foul smell on 12/23/18. Repeat UA remains negative, appears dehydrated. No indication for UCx    Code Status: Full Code                      Pending Results:        Unresulted Labs Ordered in the Past 30 Days of this Admission     No orders found from 10/22/2018 to 12/22/2018.               Discharge Instructions and Follow-Up:      Follow-up Appointments     Follow Up and recommended labs and tests      Follow up with Nursing home physician.  No follow up labs or test are   needed.                  Discharge Disposition:      Discharged to nursing home         Discharge Medications:        Current Discharge Medication List      START taking these medications    Details   apixaban ANTICOAGULANT (ELIQUIS) 5 MG tablet Take 1 tablet (5 mg) by mouth 2 times daily  Qty: 60 tablet, Refills: 0    Associated Diagnoses: Atrial fibrillation with RVR (H)      diltiazem ER (DILT-XR) 120 MG 24 hr capsule Take 1 capsule (120 mg) by mouth daily  Qty: 30 capsule, Refills: 0    Associated Diagnoses:  Atrial fibrillation with RVR (H)      metoprolol tartrate (LOPRESSOR) 25 MG tablet Take 0.5 tablets (12.5 mg) by mouth 2 times daily  Qty: 30 tablet, Refills: 0    Associated Diagnoses: Atrial fibrillation with RVR (H)      !! QUEtiapine (SEROQUEL) 25 MG tablet Take 0.5 tablets (12.5 mg) by mouth 3 times daily as needed (for agitation)    Associated Diagnoses: Senile dementia with behavioral disturbance      !! QUEtiapine (SEROQUEL) 25 MG tablet Take 0.5 tablets (12.5 mg) by mouth 2 times daily  Qty: 30 tablet, Refills: 0    Associated Diagnoses: Senile dementia with behavioral disturbance      !! QUEtiapine (SEROQUEL) 25 MG tablet Take 1 tablet (25 mg) by mouth At Bedtime  Qty: 30 tablet, Refills: 0    Associated Diagnoses: Senile dementia with behavioral disturbance       !! - Potential duplicate medications found. Please discuss with provider.      CONTINUE these medications which have CHANGED    Details   melatonin 3 MG tablet Take 1 tablet (3 mg) by mouth At Bedtime  Qty: 30 tablet, Refills: 0    Associated Diagnoses: Senile dementia with behavioral disturbance         CONTINUE these medications which have NOT CHANGED    Details   Ascorbic Acid (VITAMIN C PO) Take by mouth daily      CALCIUM CARBONATE PO Take by mouth daily      Cholecalciferol (VITAMIN D-3 PO) Take by mouth At Bedtime      COENZYME Q10 PO Take by mouth daily      Digestive Enzymes (PAPAYA ENZYME PO) Take by mouth daily      MAGNESIUM SULFATE PO Take by mouth 2 times daily      Omega-3 Fatty Acids (FISH OIL PO) Take by mouth daily      Saw Palmetto, Serenoa repens, (SAW PALMETTO PO) Take by mouth daily      simethicone (GAS-X) 80 MG chewable tablet Take 80 mg by mouth as needed for flatulence or cramping      VALERIAN ROOT PO Take by mouth At Bedtime      vitamin B complex with vitamin C (VITAMIN  B COMPLEX) tablet Take 1 tablet by mouth daily      VITAMIN E PO Take by mouth daily      ZINC SULFATE PO Take by mouth daily         STOP taking  "these medications       aspirin (ASA) 325 MG EC tablet Comments:   Reason for Stopping:         aspirin (ASA) 325 MG EC tablet Comments:   Reason for Stopping:                    Allergies:       No Known Allergies         Consultations This Hospital Stay:      Consultation during this admission received from cardiology         Condition and Physical Exam on Discharge:      Discharge condition: Stable   Discharge vitals: Blood pressure 108/89, pulse 138, temperature 97.4  F (36.3  C), temperature source Oral, resp. rate 20, height 1.88 m (6' 2\"), weight 65.3 kg (143 lb 15.4 oz), SpO2 99 %.     Constitutional:     NAD, sitting in chair, alert awake but not oriented; confused but calm  Neuropsyche:  Not oriented; cooperative and calm sitting in chair   Respiratory:        Breathing comfortably, good air exchange, no wheezes, no crackles.   Cardiovascular:  Irregular rate and rhythm, tachy, no edema.  GI:  soft, NT/ND, BS normal   Skin/Integumen:  No acute rash or bruising                     Discharge Orders for Skilled Facility (from Discharge Orders):        After Care Instructions     Activity - Up ad ambrosio      Advance Diet as Tolerated      Follow this diet upon discharge: Orders Placed This Encounter      Combination Diet Regular Diet Adult; Low Lactose Diet         Fall precautions      General info for SNF      Length of Stay Estimate: Long Term Care  Condition at Discharge: Stable  Level of care:skilled   Rehabilitation Potential: Fair  Admission H&P remains valid and up-to-date: Yes  Recent Chemotherapy: N/A  Use Nursing Home Standing Orders: Yes         Mantoux instructions      Give two-step Mantoux (PPD) Per Facility Policy Yes                    Rehab orders for Skilled Facility (from Discharge Orders):      Referrals     Future Labs/Procedures    Occupational Therapy Adult Consult     Comments:    Evaluate and treat as clinically indicated.    Reason:  dementia    Physical Therapy Adult Consult     " Comments:    Evaluate and treat as clinically indicated.    Reason:  deconditioning             Discharge Time:      Greater than 30 minutes.        Image Results From This Hospital Stay (For Non-EPIC Providers):        Results for orders placed or performed during the hospital encounter of 12/21/18   XR Chest 2 Views    Narrative    CHEST TWO VIEWS  12/21/2018 11:50 AM     HISTORY: 83-year-old patient with rapid atrial fibrillation.    COMPARISON: None       Impression    IMPRESSION: Heart size is normal. Small right pleural effusion. No  pneumothorax or abnormal area of consolidation. Minimal right basilar  atelectasis.    LIGIA BLAND MD   CT Head w/o Contrast    Narrative    CT SCAN OF THE HEAD WITHOUT CONTRAST   12/21/2018 6:30 PM     HISTORY:  Six to seven-month history of worsening cognition.    TECHNIQUE:  Axial images of the head and coronal reformations without  IV contrast material. Radiation dose for this scan was reduced using  automated exposure control, adjustment of the mA and/or kV according  to patient size, or iterative reconstruction technique.    COMPARISON: None.    FINDINGS: There is no evidence of intracranial hemorrhage, mass, acute  infarct or anomaly. There is generalized atrophy of the brain.  Moderate hippocampal atrophy. There is low attenuation in the white  matter of the cerebral hemispheres consistent with sequelae of small  vessel ischemic disease. Ventricular size is within normal limits  without evidence of hydrocephalus.     The visualized portions of the sinuses and mastoids appear normal. The  bony calvarium and bones of the skull base appear intact.       Impression    IMPRESSION:     1. No evidence of acute intracranial hemorrhage, mass, or herniation.  2. There is generalized atrophy of the brain. Moderate hippocampal  atrophy. White matter changes are present in the cerebral hemispheres  that are consistent with small vessel ischemic disease in this  age  patient.      SUNNY SR MD   MR Brain w/o & w Contrast    Narrative    MRI BRAIN WITHOUT AND WITH CONTRAST  12/22/2018 2:02 PM    HISTORY:  Altered level of consciousness. Atrial fibrillation.     TECHNIQUE:  Multiplanar, multisequence MRI of the brain without and  with 6 mL Gadavist.    COMPARISON: CT head 12/21/2018    FINDINGS:  There is generalized atrophy of the brain. White matter T2  hyperintensities are seen in the cerebral hemispheres consistent with  sequelae of small vessel ischemic disease.  There is no evidence of  hemorrhage, mass, acute infarct, or anomaly.  There are no gadolinium  enhancing lesions.     The facial structures appear normal. The arteries at the base of the  brain and the dural venous sinuses appear patent.       Impression    IMPRESSION:     1. No evidence of infarct or other acute abnormality.  2. Brain atrophy and white matter changes consistent with sequelae of  small vessel ischemic disease.     ADRIAN VERMA MD   CT Head w/o Contrast    Narrative    CT SCAN OF THE HEAD WITHOUT CONTRAST   12/27/2018 10:09 PM     HISTORY: Head trauma, minor, pt on anticoagulation; Unwitnessed fall,  pt with dementia, unable to recall if hit head, no obvious trauma, on  eliquis    TECHNIQUE:  Axial images of the head and coronal reformations without  IV contrast material. Radiation dose for this scan was reduced using  automated exposure control, adjustment of the mA and/or kV according  to patient size, or iterative reconstruction technique.    COMPARISON: MR scan dated 12/22/2018    FINDINGS:  There is diffuse parenchymal volume loss.  White matter  changes are present in the cerebral hemispheres that are consistent  with small vessel ischemic disease in this age patient. There is no  evidence of intracranial hemorrhage, mass, acute infarct or anomaly.  The visualized portions of the sinuses and mastoids appear normal.  There is no evidence of trauma.      Impression    IMPRESSION: No  acute pathology. No bleed, mass, or infarcts are seen.  No change.      HAJA HARRIS MD           Most Recent Lab Results In EPIC (For Non-EPIC Providers):    Most Recent 3 CBC's:  Recent Labs   Lab Test 12/21/18  1050   WBC 4.4   HGB 13.4   MCV 98         Most Recent 3 BMP's:  Recent Labs   Lab Test 12/27/18  0632 12/26/18  0828 12/25/18  0530  12/23/18  0548 12/22/18  0533   NA  --  140 136  --  137 138   POTASSIUM 4.1 3.9 3.6   < > 3.7 3.4   CHLORIDE  --  106 103  --  104 104   CO2  --  28 27  --  23 25   BUN  --  24 27  --   --  14   CR  --  1.12 1.27*  --  1.18 1.10   ANIONGAP  --  6 6  --   --  9   LILIANA  --  9.2 8.3*  --  8.3* 8.6   GLC  --  94 76  --  81 78    < > = values in this interval not displayed.     Most Recent 3 Troponin's:  Recent Labs   Lab Test 12/21/18  2255 12/21/18  1644 12/21/18  1050   TROPI 0.015 0.018 0.016     Most Recent 3 INR's:No lab results found.  Most Recent 2 LFT's:No lab results found.  Most Recent Cholesterol Panel:No lab results found.  Most Recent 6 Bacteria Isolates From Any Culture (See EPIC Reports for Culture Details):No lab results found.  Most Recent TSH, T4 and HgbA1c:  Recent Labs   Lab Test 12/21/18  1644   TSH 2.78

## 2018-12-28 NOTE — PROGRESS NOTES
Spoke with daughter Velma regarding patient having no PCP listed in chart.   Patient has not seen an MD for many years and will need to establish care with a PCP.  With daughter's assistance patient will establish care with Dr. Ismael العلي at the [facility].

## 2018-12-28 NOTE — CONSULTS
[facility] Psychiatric Consult Progress Note    Interval History:   Pt seen, chart reviewed, case discussed with nursing staff and treating clinicians. Patient was more calm and cooperative throughout the early part of the day yesterday, though got a bit more agitated per nursing as the day progressed. He didn't require any olanzapine, but continues on his Seroquel regimen of 12.5 mg/12.5mg/50mg. Had an unwitnessed fall last night presumed secondary to trying to get out of bed. Fell to knees. No signs of head injury. Negative CT. Sitter was removed yesterday given general progress, however sitter back in place after fall. Patient pending transfer to TCU once sitter-free for 24 hrs.     Review of systems:   10 point Review of Systems completed by Han Martinez DO, and is  is negative other than noted in the HPI     Medications:       apixaban ANTICOAGULANT  5 mg Oral BID     diltiazem ER  120 mg Oral Daily     metoprolol tartrate  12.5 mg Oral BID     QUEtiapine  12.5 mg Oral BID     QUEtiapine  50 mg Oral At Bedtime     acetaminophen, acetaminophen, magnesium sulfate, magnesium sulfate, magnesium sulfate, melatonin, metoprolol, naloxone, Reason anticoagulant not prescribed for atrial fibrillation, OLANZapine, potassium chloride, potassium chloride with lidocaine, potassium chloride, potassium chloride, potassium chloride, prochlorperazine **OR** prochlorperazine **OR** prochlorperazine, QUEtiapine    Mental Status Examination:   Patient sitting in chair, just finishing breakfast. Not really communicative with me. Doesn't answer questions with any clarity. Basically mumbles irrelevant content. No apparent distress. Calm. No evidence of response to internal stimuli.        Labs/Vitals:     Recent Results (from the past 24 hour(s))   Glucose by meter    Collection Time: 12/27/18  9:53 PM   Result Value Ref Range    Glucose 104 (H) 70 - 99 mg/dL     B/P: 112/92, T: 98.2, P: 138, R: 18    Impression:    84 yo male with delirium superimposed upon a major neurocognitive disorder consistent with Alzheimer's dementia.  Has had variable stretches where he's doing well with episodic restlessness, irritability, confusion, unredirectability. Not certain is his fall was related to medication, though OT feels that gait has been worse since initiating the medication (they aren't certain if related either). Will try a reduction in Seroquel with re-introduction of melatonin.         DIagnoses:   1.  Delirium secondary to multiple medical comorbidities  2.  Major neurocognitive disorder, Alzheimer's type  3.  Atrial fibrillation             Plan:   1. Begin melatonin 3 mg q8pm  2. Reduce evening Seroquel from 50 to 25 mg  3. Continue Seroquel 12.5 mg bid dosing      Attestation:  Patient has been seen and evaluated by me,  Han Martinez DO

## 2018-12-28 NOTE — PROGRESS NOTES
[facility]    Medicine Progress Note - Hospitalist Service       Date of Admission:  12/21/2018  Assessment & Plan  Paul Milligan is an 83-year-old male with past medical history of remote paroxysmal atrial fibrillation and subjective progressive cognitive decline as reported by daughter since March 2018.  He presented to the Emergency Department for evaluation of 3-day history of acute worsening confusion with associated tachycardia and identified to be in atrial fibrillation with RVR with possible underlying UTI.     # AFib with RVR   # Sinus Pause  - Heart Failure mild, EF 45 %.- h/o paroxysmal afib. TSH nl.  BNP elevated with small right pleural effusion on CXR, and patchy infiltrate/possible edema, no SOB/hypoxia.    - was Started metoprolol 50 mg BID at admission; Started diltiazem  mg daily on 12/23/18   - HR overnight 12/24 noted in high 30s and then increases to 130s with activity  - evaluated by EP; Given dementia suggesting conservative medical therapy; decreased metoprolol further to 12.5 mg po Bid  - 12/26 noted sinus pause 2.8 secs while sleeping; EP recommended to continue CCB and BB and Stop BB for longer pauses in waking hours (>3 seconds).   - Echo with EF 45 % and dilated IVC with some s/o fluid overload but clinically euvolumic  - Given lasix 20 mg daily x 2 days,> discontinued further doses on 12/24/18;   - slightly worsening Cr 1.18---1.27---1.12; stabilizing off diuresis    - CHADsVASc 3-4. (+/-CHF)  No evidence of stroke on MRI. Dr Herrera Discussed with patient's daughter, Velma that preventing stroke likely outweighs risk of bleed. Despite dementia, physically fit, no history of falls. Both daughters agree.   - Started eliquis 5 mg BID on 12/24.     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 Daughter staying with parents over the 3 days PTA and noted significant  confusion overnight, and this is what prompted ER visit (not afib). Head CT showed pronounced atrophy within the hippocampus, MRI negative for stroke, shows small vessel ischemic disease.   - intermittently agitated; Code Green was called 12/26, required IM Zyprexa  - Appreciate psychiatry consult on 12/24/18. Increased seroquel to 50 mg HS with 12.5 mg BID during the day.  - reevaluated by psych 12/28-- started on melatonin and reduced evening seroquel from 50 to 25 mg and to continue Seroquel 12.5 mg bid dosing  - Zyprexa IM available for emergency.   - Therapies recommend Memory Care TCU   - discontinued sitter 12/27 as has been calm this am and will monitor; had a fall last night, was evaluated by RRT (see notes)-- CT head normal    Prolonged QTc reviewed EKG shows QT of 510 and due to afib , QTc may be overestimated. Dr york Discussed with EP cardiologist, Dr. Logan. We reviewed EKG. ; repeat EKG with qTc 480    Foul smelling urine -  - RN's reporting foul smell on 12/23/18. Repeat UA remains negative, appears dehydrated. No indication for UCx     Diet: Combination Diet Regular Diet Adult; Low Lactose Diet    DVT Prophylaxis: Pneumatic Compression Devices  Petersen Catheter: not present  Code Status: Full Code        Disposition Plan   Expected discharge: discharge to [facility] today; has been off sitter for 24 hrs    Entered: Nik Davis MD 12/28/2018, 10:34 AM       Nik Davis MD  Hospitalist Service  [facility]    ______________________________________________________________________    Interval History    - had a fall last night and evaluated by RRT (see RRT note); has been off sitter since yesterday am    Data reviewed today: I reviewed all medications, new labs and imaging results over the last 24 hours.    Physical Exam   Vital Signs: Temp: 97.4  F (36.3  C) Temp src: Oral BP: 108/89 Pulse: 138 Heart Rate: 86 Resp: 20 SpO2: 99 % O2 Device: None (Room air)     Weight: 143 lbs 15.37 oz  Constitutional:  NAD, sitting in chair, alert awake but not oriented  Neuropsyche:  Not oriented; cooperative and calm sitting in chair   Respiratory:  Breathing comfortably, good air exchange, no wheezes, no crackles.   Cardiovascular:  Irregular rate and rhythm, tachy, no edema.  GI:  soft, NT/ND, BS normal   Skin/Integumen:  No acute rash or bruising        Data   Recent Labs   Lab 12/27/18  0632 12/26/18  0828 12/25/18  0530  12/23/18  0548 12/22/18  0533 12/21/18  2255 12/21/18  1644 12/21/18  1050   WBC  --   --   --   --   --   --   --   --  4.4   HGB  --   --   --   --   --   --   --   --  13.4   MCV  --   --   --   --   --   --   --   --  98   PLT  --   --   --   --   --   --   --   --  233   NA  --  140 136  --  137 138  --   --  138   POTASSIUM 4.1 3.9 3.6   < > 3.7 3.4  --   --  4.0   CHLORIDE  --  106 103  --  104 104  --   --  102   CO2  --  28 27  --  23 25  --   --  28   BUN  --  24 27  --   --  14  --   --  15   CR  --  1.12 1.27*  --  1.18 1.10  --   --  1.14   ANIONGAP  --  6 6  --   --  9  --   --  8   LILIANA  --  9.2 8.3*  --  8.3* 8.6  --   --  9.2   GLC  --  94 76  --  81 78  --   --  111*   TROPI  --   --   --   --   --   --  0.015 0.018 0.016    < > = values in this interval not displayed.     Recent Results (from the past 24 hour(s))   CT Head w/o Contrast    Narrative    CT SCAN OF THE HEAD WITHOUT CONTRAST   12/27/2018 10:09 PM     HISTORY: Head trauma, minor, pt on anticoagulation; Unwitnessed fall,  pt with dementia, unable to recall if hit head, no obvious trauma, on  eliquis    TECHNIQUE:  Axial images of the head and coronal reformations without  IV contrast material. Radiation dose for this scan was reduced using  automated exposure control, adjustment of the mA and/or kV according  to patient size, or iterative reconstruction technique.    COMPARISON: MR scan dated 12/22/2018    FINDINGS:  There is diffuse parenchymal volume loss.  White matter  changes are  present in the cerebral hemispheres that are consistent  with small vessel ischemic disease in this age patient. There is no  evidence of intracranial hemorrhage, mass, acute infarct or anomaly.  The visualized portions of the sinuses and mastoids appear normal.  There is no evidence of trauma.      Impression    IMPRESSION: No acute pathology. No bleed, mass, or infarcts are seen.  No change.      HAJA HARRIS MD     Medications     Reason anticoagulant not prescribed for atrial fibrillation         apixaban ANTICOAGULANT  5 mg Oral BID     diltiazem ER  120 mg Oral Daily     melatonin  3 mg Oral At Bedtime     metoprolol tartrate  12.5 mg Oral BID     QUEtiapine  12.5 mg Oral BID     QUEtiapine  25 mg Oral At Bedtime

## 2018-12-31 ENCOUNTER — NURSING HOME VISIT (OUTPATIENT)
Dept: GERIATRICS | Facility: CLINIC | Age: 83
End: 2018-12-31
Payer: COMMERCIAL

## 2018-12-31 ENCOUNTER — TELEPHONE (OUTPATIENT)
Dept: CARDIOLOGY | Facility: CLINIC | Age: 83
End: 2018-12-31

## 2018-12-31 VITALS
HEART RATE: 130 BPM | BODY MASS INDEX: 18.48 KG/M2 | OXYGEN SATURATION: 96 % | SYSTOLIC BLOOD PRESSURE: 120 MMHG | WEIGHT: 143.96 LBS | DIASTOLIC BLOOD PRESSURE: 86 MMHG | RESPIRATION RATE: 18 BRPM | TEMPERATURE: 98.1 F

## 2018-12-31 DIAGNOSIS — F02.818 LATE ONSET ALZHEIMER'S DISEASE WITH BEHAVIORAL DISTURBANCE (H): ICD-10-CM

## 2018-12-31 DIAGNOSIS — G30.1 LATE ONSET ALZHEIMER'S DISEASE WITH BEHAVIORAL DISTURBANCE (H): ICD-10-CM

## 2018-12-31 DIAGNOSIS — I48.91 ATRIAL FIBRILLATION WITH RVR (H): Primary | ICD-10-CM

## 2018-12-31 DIAGNOSIS — R82.90 FOUL SMELLING URINE: ICD-10-CM

## 2018-12-31 DIAGNOSIS — I45.5 SINUS PAUSE: ICD-10-CM

## 2018-12-31 DIAGNOSIS — R53.81 PHYSICAL DECONDITIONING: ICD-10-CM

## 2018-12-31 DIAGNOSIS — R94.31 PROLONGED QT INTERVAL: ICD-10-CM

## 2018-12-31 PROCEDURE — 99310 SBSQ NF CARE HIGH MDM 45: CPT | Performed by: NURSE PRACTITIONER

## 2018-12-31 NOTE — LETTER
12/31/2018        RE: Paul Milligan  [address]  [facility] [address]        [facility]  PRIMARY CARE PROVIDER AND CLINIC:  No Ref-Primary, Physician No address on file  Chief Complaint   Patient presents with     Hospital F/U     [facility] Medical Record Number:  [id number]  Place of Service where encounter took place:  [facility] ([facility]) [[id number]]    HPI:    Paul Milligan is a 83 year old  (1935),admitted to the above facility from  [facility].  Hospital stay 12/21/2018 through 12/28/2018.  Admitted to this facility for  rehab, medical management and nursing care.  HPI information obtained from: facility chart records, facility staff and patient report.      Discharge Diagnoses/Problem Oriented Hospital Course (Providers):   Paul Milligan was admitted on 12/21/2018 by Miladis Herrera MD and I would refer you to their history and physical.  The following problems were addressed during his hospitalization:     Paul Milligan is an 83-year-old male with past medical history of remote paroxysmal atrial fibrillation and subjective progressive cognitive decline as reported by daughter since March 2018.  He presented to the Emergency Department for evaluation of 3-day history of acute worsening confusion with associated tachycardia and identified to be in atrial fibrillation with RVR with possible underlying UTI.      # AFib with RVR   # Sinus Pause  - Heart Failure mild, EF 45 %.- h/o paroxysmal afib. TSH nl.  BNP elevated with small right pleural effusion on CXR, and patchy infiltrate/possible edema, no SOB/hypoxia.    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   - Echo with EF 45 % and dilated  "IVC with some s/o fluid overload but clinically euvolumic  - received couple doses of Iv lasix intermittently  - slightly worsening Cr 1.18---1.27---1.12; stabilizing off diuresis    - CHADsVASc 3-4. (+/-CHF)  No evidence of stroke on MRI. Dr York Discussed with patient's daughter, Velma that preventing stroke likely outweighs risk of bleed. Despite dementia, physically fit, no history of falls. Both daughters agree.   - Started eliquis 5 mg BID on 12/24.      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 Daughter staying with parents over the 3 days PTA and noted significant confusion overnight, and this is what prompted ER visit (not afib). Head CT showed pronounced atrophy within the hippocampus, MRI negative for stroke, shows small vessel ischemic disease.   - intermittently agitated; required IM Zyprexa  - was evaluated by psychiatry and started on seroquel, melatonin   - Therapies recommend Memory Care TCU      Prolonged QTc reviewed EKG shows QT of 510 and due to afib , QTc may be overestimated. Dr york Discussed with EP cardiologist, Dr. Logan. We reviewed EKG. ; repeat EKG with qTc 480     Foul smelling urine -  - RN's reporting foul smell on 12/23/18. Repeat UA remains negative, appears dehydrated. No indication for UCx  ______________________________________________  Current issues are:         Atrial fibrillation with RVR (H)  Sinus pause  Prolonged QT interval  Late onset Alzheimer's disease with behavioral disturbance  Foul smelling urine  Physical deconditioning     Patient seen today for admission visit. Pleasant, elderly man seen sitting in recliner tin day room -- breathing is \"puffing\" at rest, appears SOB - denies SOB. Staff note he has been resting in chair for quite some time - no recent activity. Minimal appropriate response to questions today.     BP " Readin/86  129/79  125/97  120/61  116/82  149/93  128/90    HR:    Wt Readings from Last 4 Encounters:   18 65.3 kg (143 lb 15.4 oz)         CODE STATUS/ADVANCE DIRECTIVES DISCUSSION:   CPR/Full code   Patient's living condition: lives with spouse    ALLERGIES:No known allergies  PAST MEDICAL HISTORY:  has no past medical history on file.  PAST SURGICAL HISTORY:  has a past surgical history that includes orthopedic surgery.  FAMILY HISTORY: family history is not on file.  SOCIAL HISTORY:  reports that  has never smoked. he has never used smokeless tobacco. He reports that he does not drink alcohol or use drugs.    Post Discharge Medication Reconciliation Status: discharge medications reconciled, continue medications without change.  Current Outpatient Medications   Medication Sig Dispense Refill     apixaban ANTICOAGULANT (ELIQUIS) 5 MG tablet Take 1 tablet (5 mg) by mouth 2 times daily 60 tablet 0     diltiazem ER (DILT-XR) 120 MG 24 hr capsule Take 1 capsule (120 mg) by mouth daily 30 capsule 0     melatonin 3 MG tablet Take 1 tablet (3 mg) by mouth At Bedtime 30 tablet 0     metoprolol tartrate (LOPRESSOR) 25 MG tablet Take 0.5 tablets (12.5 mg) by mouth 2 times daily 30 tablet 0     QUEtiapine (SEROQUEL) 25 MG tablet Take 0.5 tablets (12.5 mg) by mouth 3 times daily as needed (for agitation)       QUEtiapine (SEROQUEL) 25 MG tablet Take 0.5 tablets (12.5 mg) by mouth 2 times daily 30 tablet 0     QUEtiapine (SEROQUEL) 25 MG tablet Take 1 tablet (25 mg) by mouth At Bedtime 30 tablet 0     Ascorbic Acid (VITAMIN C PO) Take by mouth daily       CALCIUM CARBONATE PO Take by mouth daily       Cholecalciferol (VITAMIN D-3 PO) Take by mouth At Bedtime       COENZYME Q10 PO Take by mouth daily       Digestive Enzymes (PAPAYA ENZYME PO) Take by mouth daily       MAGNESIUM SULFATE PO Take by mouth 2 times daily       Omega-3 Fatty Acids (FISH OIL PO) Take by mouth daily       Saw [place], [place]  repens, (SAW PALMETTO PO) Take by mouth daily       simethicone (GAS-X) 80 MG chewable tablet Take 80 mg by mouth as needed for flatulence or cramping       VALERIAN ROOT PO Take by mouth At Bedtime       vitamin B complex with vitamin C (VITAMIN  B COMPLEX) tablet Take 1 tablet by mouth daily       VITAMIN E PO Take by mouth daily       ZINC SULFATE PO Take by mouth daily         ROS:  Limited secondary to cognitive impairment but today pt reports he's okay    Exam:  /86   Pulse 130   Temp 98.1  F (36.7  C)   Resp 18   Wt 65.3 kg (143 lb 15.4 oz)   SpO2 96%   BMI 18.48 kg/m     GENERAL APPEARANCE:  appears healthy, somnolent, in respiratory distress due to Afib, cooperative, elderly male upright in recliner in dayroom  ENT:  Mouth and posterior oropharynx normal, moist mucous membranes, [place]  EYES:  EOM, conjunctivae, lids, pupils and irises normal  NECK:  No adenopathy,masses or thyromegaly  RESP:  respiratory effort and palpation of chest normal, lungs clear to auscultation   CV:  Palpation and auscultation of heart done , no edema, +2 pedal pulses, irregular rhythm - atrial fibrillation, Skin:  no open areas, No JVD  ABDOMEN:  normal bowel sounds, soft, nontender, no hepatosplenomegaly or other masses  M/S:   Gait and station abnormal - ADELA 2/2 patient being in recliner  Digits and nails abnormal - arthritic changes present  SKIN:  Inspection of skin and subcutaneous tissue baseline, Palpation of skin and subcutaneous tissue baseline, skin thin and dry  NEURO:   Cranial nerves 2-12 are normal tested and grossly at patient's baseline  PSYCH:  oriented to self, insight and judgement impaired, memory impaired , affect abnormal - withdrawn    Lab/Diagnostic data:  CBC RESULTS:   Recent Labs   Lab Test 12/21/18  1050   WBC 4.4   RBC 4.11*   HGB 13.4   HCT 40.1   MCV 98   MCH 32.6   MCHC 33.4   RDW 13.4          Last Basic Metabolic Panel:  Recent Labs   Lab Test 12/27/18  0632 12/26/18  0828  12/25/18  0530   NA  --  140 136   POTASSIUM 4.1 3.9 3.6   CHLORIDE  --  106 103   LILIANA  --  9.2 8.3*   CO2  --  28 27   BUN  --  24 27   CR  --  1.12 1.27*   GLC  --  94 76       TSH   Date Value Ref Range Status   12/21/2018 2.78 0.40 - 4.00 mU/L Final          ASSESSMENT/PLAN:  (I48.91) Atrial fibrillation with RVR (H)  (primary encounter diagnosis)  (I45.5) Sinus pause  (R94.31) Prolonged QT interval  Comment: Patient in severe Afib today causing what appears to be SOB, but most likely him trying to catch his breath in between the irregularity noted. HR range: 65-130bpm. Currently on regimen of Metoprolol 12.5mg PO BID, Eliquis and Diltiazem 120mg qDay.  Plan: Update cardiology. Increase Metoprolol to 25mg PO BID - hold for HR <60bpm. Continue other medications as ordered.    (G30.1,  F02.81) Late onset Alzheimer's disease with behavioral disturbance  Comment: Chronic; progressive; continues to require 24-hr supportive cares. Managed on regimen of Seroquel. Currently on memory care TCU. No POA paperwork noted in patient chart.  Plan: Continue with  TCU placement for rehab, Continue medications as ordered. OT following - adv per their recommendations. SW to assist with discharge planning. Attain POA paperwork.     (R82.90) Foul smelling urine  Comment: As noted above - no ongoing concerns.  Plan: Monitor    (R53.81) Physical deconditioning  Comment: 2/2 recent hospitalization and above noted diagnoses.  Plan: PT/OT - adv per their recommendations. CBC/BMP.        Electronically signed by:  PAUL Hahn CNP                    Sincerely,        PAUL Hahn CNP

## 2018-12-31 NOTE — PROGRESS NOTES
[facility]  PRIMARY CARE PROVIDER AND CLINIC:  No Ref-Primary, Physician No address on file  Chief Complaint   Patient presents with     Hospital F/U     [facility] Medical Record Number:  [id number]  Place of Service where encounter took place:  [facility] ([facility]) [[id number]]    HPI:    Paul Milligan is a 83 year old  (1935),admitted to the above facility from  [facility].  Hospital stay 12/21/2018 through 12/28/2018.  Admitted to this facility for  rehab, medical management and nursing care.  HPI information obtained from: facility chart records, facility staff and patient report.      Discharge Diagnoses/Problem Oriented Hospital Course (Providers):   Paul Milligan was admitted on 12/21/2018 by Miladis Herrera MD and I would refer you to their history and physical.  The following problems were addressed during his hospitalization:     Pual Milligan is an 83-year-old male with past medical history of remote paroxysmal atrial fibrillation and subjective progressive cognitive decline as reported by daughter since March 2018.  He presented to the Emergency Department for evaluation of 3-day history of acute worsening confusion with associated tachycardia and identified to be in atrial fibrillation with RVR with possible underlying UTI.      # AFib with RVR   # Sinus Pause  - Heart Failure mild, EF 45 %.- h/o paroxysmal afib. TSH nl.  BNP elevated with small right pleural effusion on CXR, and patchy infiltrate/possible edema, no SOB/hypoxia.    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   - Echo with EF 45 % and dilated IVC with some s/o fluid overload but clinically euvolumic  - received couple doses of  "Iv lasix intermittently  - slightly worsening Cr 1.18---1.27---1.12; stabilizing off diuresis    - CHADsVASc 3-4. (+/-CHF)  No evidence of stroke on MRI. Dr York Discussed with patient's daughterVelma that preventing stroke likely outweighs risk of bleed. Despite dementia, physically fit, no history of falls. Both daughters agree.   - Started eliquis 5 mg BID on .      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 Daughter staying with parents over the 3 days PTA and noted significant confusion overnight, and this is what prompted ER visit (not afib). Head CT showed pronounced atrophy within the hippocampus, MRI negative for stroke, shows small vessel ischemic disease.   - intermittently agitated; required IM Zyprexa  - was evaluated by psychiatry and started on seroquel, melatonin   - Therapies recommend Memory Care TCU      Prolonged QTc reviewed EKG shows QT of 510 and due to afib , QTc may be overestimated. Dr york Discussed with EP cardiologist, Dr. Logan. We reviewed EKG. ; repeat EKG with qTc 480     Foul smelling urine -  - RN's reporting foul smell on 18. Repeat UA remains negative, appears dehydrated. No indication for UCx  ______________________________________________  Current issues are:         Atrial fibrillation with RVR (H)  Sinus pause  Prolonged QT interval  Late onset Alzheimer's disease with behavioral disturbance  Foul smelling urine  Physical deconditioning     Patient seen today for admission visit. Pleasant, elderly man seen sitting in recliner tin day room -- breathing is \"puffing\" at rest, appears SOB - denies SOB. Staff note he has been resting in chair for quite some time - no recent activity. Minimal appropriate response to questions today.     BP Readin/86  129/79  125/97  120/61  116/82  149/93  128/90    HR:    Wt Readings from Last 4 Encounters: "   12/27/18 65.3 kg (143 lb 15.4 oz)         CODE STATUS/ADVANCE DIRECTIVES DISCUSSION:   CPR/Full code   Patient's living condition: lives with spouse    ALLERGIES:No known allergies  PAST MEDICAL HISTORY:  has no past medical history on file.  PAST SURGICAL HISTORY:  has a past surgical history that includes orthopedic surgery.  FAMILY HISTORY: family history is not on file.  SOCIAL HISTORY:  reports that  has never smoked. he has never used smokeless tobacco. He reports that he does not drink alcohol or use drugs.    Post Discharge Medication Reconciliation Status: discharge medications reconciled, continue medications without change.  Current Outpatient Medications   Medication Sig Dispense Refill     apixaban ANTICOAGULANT (ELIQUIS) 5 MG tablet Take 1 tablet (5 mg) by mouth 2 times daily 60 tablet 0     diltiazem ER (DILT-XR) 120 MG 24 hr capsule Take 1 capsule (120 mg) by mouth daily 30 capsule 0     melatonin 3 MG tablet Take 1 tablet (3 mg) by mouth At Bedtime 30 tablet 0     metoprolol tartrate (LOPRESSOR) 25 MG tablet Take 0.5 tablets (12.5 mg) by mouth 2 times daily 30 tablet 0     QUEtiapine (SEROQUEL) 25 MG tablet Take 0.5 tablets (12.5 mg) by mouth 3 times daily as needed (for agitation)       QUEtiapine (SEROQUEL) 25 MG tablet Take 0.5 tablets (12.5 mg) by mouth 2 times daily 30 tablet 0     QUEtiapine (SEROQUEL) 25 MG tablet Take 1 tablet (25 mg) by mouth At Bedtime 30 tablet 0     Ascorbic Acid (VITAMIN C PO) Take by mouth daily       CALCIUM CARBONATE PO Take by mouth daily       Cholecalciferol (VITAMIN D-3 PO) Take by mouth At Bedtime       COENZYME Q10 PO Take by mouth daily       Digestive Enzymes (PAPAYA ENZYME PO) Take by mouth daily       MAGNESIUM SULFATE PO Take by mouth 2 times daily       Omega-3 Fatty Acids (FISH OIL PO) Take by mouth daily       Saw Palmetto, Serenoa repens, (SAW PALMETTO PO) Take by mouth daily       simethicone (GAS-X) 80 MG chewable tablet Take 80 mg by mouth as  needed for flatulence or cramping       VALERIAN ROOT PO Take by mouth At Bedtime       vitamin B complex with vitamin C (VITAMIN  B COMPLEX) tablet Take 1 tablet by mouth daily       VITAMIN E PO Take by mouth daily       ZINC SULFATE PO Take by mouth daily         ROS:  Limited secondary to cognitive impairment but today pt reports he's okay    Exam:  /86   Pulse 130   Temp 98.1  F (36.7  C)   Resp 18   Wt 65.3 kg (143 lb 15.4 oz)   SpO2 96%   BMI 18.48 kg/m    GENERAL APPEARANCE:  appears healthy, somnolent, in respiratory distress due to Afib, cooperative, elderly male upright in recliner in dayroom  ENT:  Mouth and posterior oropharynx normal, moist mucous membranes, [place]  EYES:  EOM, conjunctivae, lids, pupils and irises normal  NECK:  No adenopathy,masses or thyromegaly  RESP:  respiratory effort and palpation of chest normal, lungs clear to auscultation   CV:  Palpation and auscultation of heart done , no edema, +2 pedal pulses, irregular rhythm - atrial fibrillation, Skin:  no open areas, No JVD  ABDOMEN:  normal bowel sounds, soft, nontender, no hepatosplenomegaly or other masses  M/S:   Gait and station abnormal - ADELA 2/2 patient being in recliner  Digits and nails abnormal - arthritic changes present  SKIN:  Inspection of skin and subcutaneous tissue baseline, Palpation of skin and subcutaneous tissue baseline, skin thin and dry  NEURO:   Cranial nerves 2-12 are normal tested and grossly at patient's baseline  PSYCH:  oriented to self, insight and judgement impaired, memory impaired , affect abnormal - withdrawn    Lab/Diagnostic data:  CBC RESULTS:   Recent Labs   Lab Test 12/21/18  1050   WBC 4.4   RBC 4.11*   HGB 13.4   HCT 40.1   MCV 98   MCH 32.6   MCHC 33.4   RDW 13.4          Last Basic Metabolic Panel:  Recent Labs   Lab Test 12/27/18  0632 12/26/18  0828 12/25/18  0530   NA  --  140 136   POTASSIUM 4.1 3.9 3.6   CHLORIDE  --  106 103   LILIANA  --  9.2 8.3*   CO2  --  28 27   BUN   --  24 27   CR  --  1.12 1.27*   GLC  --  94 76       TSH   Date Value Ref Range Status   12/21/2018 2.78 0.40 - 4.00 mU/L Final          ASSESSMENT/PLAN:  (I48.91) Atrial fibrillation with RVR (H)  (primary encounter diagnosis)  (I45.5) Sinus pause  (R94.31) Prolonged QT interval  Comment: Patient in severe Afib today causing what appears to be SOB, but most likely him trying to catch his breath in between the irregularity noted. HR range: 65-130bpm. Currently on regimen of Metoprolol 12.5mg PO BID, Eliquis and Diltiazem 120mg qDay.  Plan: Update cardiology. Increase Metoprolol to 25mg PO BID - hold for HR <60bpm. Continue other medications as ordered.    (G30.1,  F02.81) Late onset Alzheimer's disease with behavioral disturbance  Comment: Chronic; progressive; continues to require 24-hr supportive cares. Managed on regimen of Seroquel. Currently on memory care TCU. No POA paperwork noted in patient chart.  Plan: Continue with  TCU placement for rehab, Continue medications as ordered. OT following - adv per their recommendations. SW to assist with discharge planning. Attain POA paperwork.     (R82.90) Foul smelling urine  Comment: As noted above - no ongoing concerns.  Plan: Monitor    (R53.81) Physical deconditioning  Comment: 2/2 recent hospitalization and above noted diagnoses.  Plan: PT/OT - adv per their recommendations. CBC/BMP.        Electronically signed by:  PAUL Hahn CNP

## 2018-12-31 NOTE — TELEPHONE ENCOUNTER
Patient was evaluated by cardiology while inpatient for afib with CVR .RN called [facility] to confirm the follow up plan for patient with Care Coordinator. Patient has dementia and conservative medical therapies were discussed. No follow up ordered for cardiology. RN let direct phone number with care coordinator should patient's daughter desire patient have follow up with cardiology.           12/27/18 Cardiology progress note  asymptomatic AF with CVR, longest pause 2.8 seconds at 830 am without sxs     Key management decisions made by me: continue with current ccb and bb. Stop bb for longer pauses in waking hours (>3 seconds).    Paul Milligan is a 83 year old male with past medical history significant for waxing and waning cognitive issues   admitted on 12/21/2018 for confusion, possible UTI and found to be in atrial fibrillation with RVR.   EP was asked to see patient due to atrial fibrillation of unknown duration.   He was started on diltiazem IV along with diltiazem  mg daily and metoprolol 25mg twice daily. His heart rate was noted in the 30's at night presumably when sleeping and during the day in 70's therefore his AV node blockers were decreased. His EKG reviewed by Dr. Yane URIAS with RVR at 142 bpm along with RBBB suggesting a conduction disease  His ECHO (12/25/2018) revealed EF 45-50% with mildly dilated ascending aorta, with dilated IVC.        Interval History  VS reviewed and HR has been  bpm, afebrile, -126/64-89, oxygen % on room air.  His weight has been stable.  His I &O reveal he is up 2.3 L since admission and down 0.25 L on 12/26.  Smart disclosure reviewed and the longest pause was 2.8 seconds at 8:30 on 12/27.  He is oriented x 1 and is being fed by staff this morning.          Assessment/Plan:  1.  Atrial fibrillation  Atrial fibrillation of unknown time frame.   His ECHO show reduced EF of 45% and dilated IVC.  On exam he is euvolemic.   Originally, his  diltiazem was 120 mg daily and metoprolol was 25 mg BID and were decreased on 12/27. Over the past 24 hours, his heart rates have been 60s-110s with the longest pause was 2.8 seconds at 8:30 on 12/27.  Plan:    Continue anticoagulation for a CHADS VASC 2 (age++) continue Eliquis 5 mg twice daily.   He qualifies for his based on weight of 64 kg and normal creatinine.   If his weight decreases below 60 kg recommend decreasing Eliquis to 2.5 mg twice daily.      Continue diltiazem 120 mg daily     Continue metoprolol tartrate 12.5 mg twice daily         PAUL Yadav CNP  [facility]

## 2019-01-01 ENCOUNTER — RECORDS - HEALTHEAST (OUTPATIENT)
Dept: LAB | Facility: CLINIC | Age: 84
End: 2019-01-01

## 2019-01-02 ENCOUNTER — TRANSFERRED RECORDS (OUTPATIENT)
Dept: HEALTH INFORMATION MANAGEMENT | Facility: CLINIC | Age: 84
End: 2019-01-02

## 2019-01-02 LAB
ANION GAP SERPL CALCULATED.3IONS-SCNC: 8 MMOL/L (ref 5–18)
ANION GAP SERPL CALCULATED.3IONS-SCNC: 8 MMOL/L (ref 5–18)
BUN SERPL-MCNC: 28 MG/DL (ref 8–28)
BUN SERPL-MCNC: 28 MG/DL (ref 8–28)
CALCIUM SERPL-MCNC: 9.2 MG/DL (ref 8.5–10.5)
CALCIUM SERPL-MCNC: 9.2 MG/DL (ref 8.5–10.5)
CHLORIDE BLD-SCNC: 101 MMOL/L (ref 98–107)
CHLORIDE SERPLBLD-SCNC: 101 MMOL/L (ref 98–107)
CO2 SERPL-SCNC: 26 MMOL/L (ref 22–31)
CO2 SERPL-SCNC: 26 MMOL/L (ref 22–31)
CREAT SERPL-MCNC: 1.03 MG/DL (ref 0.7–1.3)
CREAT SERPL-MCNC: 1.03 MG/DL (ref 0.7–1.3)
ERYTHROCYTE [DISTWIDTH] IN BLOOD BY AUTOMATED COUNT: 13.2 % (ref 11–14.5)
ERYTHROCYTE [DISTWIDTH] IN BLOOD BY AUTOMATED COUNT: 13.2 % (ref 11–14.5)
GFR SERPL CREATININE-BSD FRML MDRD: >60 ML/MIN/1.73M2
GFR SERPL CREATININE-BSD FRML MDRD: >60 ML/MIN/1.73M2
GLUCOSE BLD-MCNC: 94 MG/DL (ref 70–125)
GLUCOSE SERPL-MCNC: 94 MG/DL (ref 70–125)
HCT VFR BLD AUTO: 38.4 % (ref 40–54)
HCT VFR BLD AUTO: 38.4 % (ref 40–54)
HEMOGLOBIN: 12.4 G/DL (ref 14–18)
HGB BLD-MCNC: 12.4 G/DL (ref 14–18)
MCH RBC QN AUTO: 32.5 PG (ref 27–34)
MCH RBC QN AUTO: 32.5 PG (ref 27–34)
MCHC RBC AUTO-ENTMCNC: 32.3 G/DL (ref 32–36)
MCHC RBC AUTO-ENTMCNC: 32.3 G/DL (ref 32–36)
MCV RBC AUTO: 101 FL (ref 80–100)
MCV RBC AUTO: 101 FL (ref 80–100)
PLATELET # BLD AUTO: 179 THOU/UL (ref 140–440)
PLATELET # BLD AUTO: 179 THOU/UL (ref 140–440)
PMV BLD AUTO: 11.5 FL (ref 8.5–12.5)
POTASSIUM BLD-SCNC: 3.9 MMOL/L (ref 3.5–5)
POTASSIUM SERPL-SCNC: 3.9 MMOL/L (ref 3.5–5)
RBC # BLD AUTO: 3.81 MILL/UL (ref 4.4–6.2)
RBC # BLD AUTO: 3.81 MILL/UL (ref 4.4–6.2)
SODIUM SERPL-SCNC: 135 MMOL/L (ref 136–145)
SODIUM SERPL-SCNC: 135 MMOL/L (ref 136–145)
WBC # BLD AUTO: 4.9 THOU/UL (ref 4–11)
WBC: 4.9 THOU/UL (ref 4–11)

## 2019-01-03 ENCOUNTER — NURSING HOME VISIT (OUTPATIENT)
Dept: GERIATRICS | Facility: CLINIC | Age: 84
End: 2019-01-03
Payer: COMMERCIAL

## 2019-01-03 VITALS
RESPIRATION RATE: 18 BRPM | SYSTOLIC BLOOD PRESSURE: 133 MMHG | OXYGEN SATURATION: 98 % | TEMPERATURE: 98.6 F | DIASTOLIC BLOOD PRESSURE: 80 MMHG | HEART RATE: 89 BPM

## 2019-01-03 DIAGNOSIS — R53.81 PHYSICAL DECONDITIONING: ICD-10-CM

## 2019-01-03 DIAGNOSIS — R41.0 DELIRIUM: ICD-10-CM

## 2019-01-03 DIAGNOSIS — G47.01 INSOMNIA DUE TO MEDICAL CONDITION: ICD-10-CM

## 2019-01-03 DIAGNOSIS — I49.5 TACHY-BRADY SYNDROME (H): ICD-10-CM

## 2019-01-03 DIAGNOSIS — F03.90 DEMENTIA WITHOUT BEHAVIORAL DISTURBANCE, UNSPECIFIED DEMENTIA TYPE: ICD-10-CM

## 2019-01-03 DIAGNOSIS — I48.0 PAROXYSMAL ATRIAL FIBRILLATION (H): Primary | ICD-10-CM

## 2019-01-03 PROCEDURE — 99305 1ST NF CARE MODERATE MDM 35: CPT | Performed by: INTERNAL MEDICINE

## 2019-01-03 RX ORDER — ACETAMINOPHEN 325 MG/1
650 TABLET ORAL EVERY 4 HOURS PRN
COMMUNITY
End: 2019-02-04

## 2019-01-03 NOTE — LETTER
1/3/2019        RE: Paul Milligan  [address]  [facility] [address]        [facility]  INITIAL VISIT NOTE  January 3, 2019    PRIMARY CARE PROVIDER AND CLINIC:  No Ref-Primary, Physician No address on file    Chief Complaint   Patient presents with     Hospital F/U       HPI:    Paul Milligan is a 83 year old  (1935) male who was seen at [facility] on January 3, 2019 for an initial visit. Medical history is notable for atrial fibrillation and cognitive impairment. He was hospitalized at [facility] from 12/21/18 to 12/28/18 where he presented with increasing confusion. He was found to be in a-fib with RVR and is new on metoprolol and diltiazem. 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 Had some sinus pauses and metoprolol had to be reduced. He is new on apixaban. TTE with EF 45%. Psychiatry consulted re: progressive memory loss. Per their notes, dementia with acute delirium and he is new on quetiapine and melatonin. He was admitted to this facility for medical management and rehab.     Today, Mr. Milligan is seen in the memory care TCU. History is limited due to his cognitive impairment. He is oriented to self. Denies any pain. PT notes some intermittent anxiety. No concerns per nursing.     CODE STATUS:   CPR/Full code     ALLERGIES:     Allergies   Allergen Reactions     No Known Allergies        PAST MEDICAL HISTORY:   Atrial fibrillation  Cognitive impairment    PAST SURGICAL HISTORY:   Past Surgical History:   Procedure Laterality Date     ORTHOPEDIC SURGERY         FAMILY HISTORY:   Unable to review due to cognitive impairment    SOCIAL HISTORY:   Lives with spouse     MEDICATIONS:  Current Outpatient Medications   Medication Sig Dispense Refill     acetaminophen (TYLENOL) 325 MG tablet Take 650 mg by mouth every 4 hours as needed for mild pain for 3 days       apixaban ANTICOAGULANT (ELIQUIS) 5 MG tablet Take 1 tablet (5 mg) by mouth  2 times daily 60 tablet 0     diltiazem ER (DILT-XR) 120 MG 24 hr capsule Take 1 capsule (120 mg) by mouth daily 30 capsule 0     melatonin 3 MG tablet Take 1 tablet (3 mg) by mouth At Bedtime 30 tablet 0     metoprolol tartrate (LOPRESSOR) 25 MG tablet Take 0.5 tablets (12.5 mg) by mouth 2 times daily 30 tablet 0     QUEtiapine (SEROQUEL) 25 MG tablet Take 0.5 tablets (12.5 mg) by mouth 3 times daily as needed (for agitation)       QUEtiapine (SEROQUEL) 25 MG tablet Take 0.5 tablets (12.5 mg) by mouth 2 times daily 30 tablet 0     QUEtiapine (SEROQUEL) 25 MG tablet Take 1 tablet (25 mg) by mouth At Bedtime 30 tablet 0     Ascorbic Acid (VITAMIN C PO) Take by mouth daily       CALCIUM CARBONATE PO Take by mouth daily       Cholecalciferol (VITAMIN D-3 PO) Take by mouth At Bedtime       COENZYME Q10 PO Take by mouth daily       Digestive Enzymes (PAPAYA ENZYME PO) Take by mouth daily       MAGNESIUM SULFATE PO Take by mouth 2 times daily       Omega-3 Fatty Acids (FISH OIL PO) Take by mouth daily       Saw Palmetto, Serenoa repens, (SAW PALMETTO PO) Take by mouth daily       simethicone (GAS-X) 80 MG chewable tablet Take 80 mg by mouth as needed for flatulence or cramping       VALERIAN ROOT PO Take by mouth At Bedtime       vitamin B complex with vitamin C (VITAMIN  B COMPLEX) tablet Take 1 tablet by mouth daily       VITAMIN E PO Take by mouth daily       ZINC SULFATE PO Take by mouth daily         Post Discharge Medication Reconciliation Status: medication reconcilation previously completed during another office visit.    ROS:  Unable to obtain due to cognitive impairment or aphasia    PHYSICAL EXAM:  /80   Pulse 89   Temp 98.6  F (37  C)   Resp 18   SpO2 98%   Gen: sitting in chair, alert, cooperative and in no acute distress  HEENT: normocephalic; oropharynx clear  Card: RRR, S1, S2, no murmurs  Resp: lungs clear to auscultation bilaterally  GI: abdomen soft, non-distended  MSK: normal muscle tone, no  LE edema  Neuro: CX II-XII grossly in tact; ROM in all four extremities grossly in tact  Psych: memory, judgement and insight impaired    LABORATORY/IMAGING DATA:  Reviewed as per Epic    ASSESSMENT/PLAN:    Paroxysmal Atrial Fibrillation  Tachy Drew Syndrome  Presented in RVR. New on metoprolol (dose decreased to 12.5 mg BID due to sinus pause) and diltiazem. HR labile in 70s-150s at TCU, most 110s-130s. Cardiology increased metoprolol on 12/31/18. RRR today with HR in 70s  -- continues on diltiazem 120 mg daily and metoprolol 25 mg BID  -- anticoagulated with apixaban 5 mg BID    Dementia Without Behavioral Disturbance  Acute Delirium  Reported progressive memory loss since March 2018. Psychiatry consulted and clinical picture consistent with dementia with delirium. New on quetiapine.  -- OT following for cog assessment  -- continues on quetiapine 12.5 mg BID and 25 mg qhs as well as 12.5 mg TID PRN      -- goal is to reduce and, if able, discontinued quetiapine    Insomnia  New on melatonin during hospitalization.  -- continues on melatonin 3 mg    Physical Deconditioning  In setting of hospitalization and underlying medical conditions  -- ongoing PT/OT      Electronically signed by:  Rupinder Biggs MD                        Sincerely,        Rupinder Biggs MD

## 2019-01-03 NOTE — PROGRESS NOTES
[place]  INITIAL VISIT NOTE  January 3, 2019    PRIMARY CARE PROVIDER AND CLINIC:  No Ref-Primary, Physician No address on file    Chief Complaint   Patient presents with     Hospital F/U       HPI:    Paul Milligan is a 83 year old  (1935) male who was seen at [facility] TCU on January 3, 2019 for an initial visit. Medical history is notable for atrial fibrillation and cognitive impairment. He was hospitalized at [facility] from 12/21/18 to 12/28/18 where he presented with increasing confusion. He was found to be in a-fib with RVR and is new on metoprolol and diltiazem. 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 Had some sinus pauses and metoprolol had to be reduced. He is new on apixaban. TTE with EF 45%. Psychiatry consulted re: progressive memory loss. Per their notes, dementia with acute delirium and he is new on quetiapine and melatonin. He was admitted to this facility for medical management and rehab.     Today, Mr. Milligan is seen in the memory care TCU. History is limited due to his cognitive impairment. He is oriented to self. Denies any pain. PT notes some intermittent anxiety. No concerns per nursing.     CODE STATUS:   CPR/Full code     ALLERGIES:     Allergies   Allergen Reactions     No Known Allergies        PAST MEDICAL HISTORY:   Atrial fibrillation  Cognitive impairment    PAST SURGICAL HISTORY:   Past Surgical History:   Procedure Laterality Date     ORTHOPEDIC SURGERY         FAMILY HISTORY:   Unable to review due to cognitive impairment    SOCIAL HISTORY:   Lives with spouse     MEDICATIONS:  Current Outpatient Medications   Medication Sig Dispense Refill     acetaminophen (TYLENOL) 325 MG tablet Take 650 mg by mouth every 4 hours as needed for mild pain for 3 days       apixaban ANTICOAGULANT (ELIQUIS) 5 MG tablet Take 1 tablet (5 mg) by mouth 2 times daily 60 tablet 0     diltiazem ER (DILT-XR) 120 MG 24 hr capsule Take 1  capsule (120 mg) by mouth daily 30 capsule 0     melatonin 3 MG tablet Take 1 tablet (3 mg) by mouth At Bedtime 30 tablet 0     metoprolol tartrate (LOPRESSOR) 25 MG tablet Take 0.5 tablets (12.5 mg) by mouth 2 times daily 30 tablet 0     QUEtiapine (SEROQUEL) 25 MG tablet Take 0.5 tablets (12.5 mg) by mouth 3 times daily as needed (for agitation)       QUEtiapine (SEROQUEL) 25 MG tablet Take 0.5 tablets (12.5 mg) by mouth 2 times daily 30 tablet 0     QUEtiapine (SEROQUEL) 25 MG tablet Take 1 tablet (25 mg) by mouth At Bedtime 30 tablet 0     Ascorbic Acid (VITAMIN C PO) Take by mouth daily       CALCIUM CARBONATE PO Take by mouth daily       Cholecalciferol (VITAMIN D-3 PO) Take by mouth At Bedtime       COENZYME Q10 PO Take by mouth daily       Digestive Enzymes (PAPAYA ENZYME PO) Take by mouth daily       MAGNESIUM SULFATE PO Take by mouth 2 times daily       Omega-3 Fatty Acids (FISH OIL PO) Take by mouth daily       Saw Palmetto, Serenoa repens, (SAW PALMETTO PO) Take by mouth daily       simethicone (GAS-X) 80 MG chewable tablet Take 80 mg by mouth as needed for flatulence or cramping       VALERIAN ROOT PO Take by mouth At Bedtime       vitamin B complex with vitamin C (VITAMIN  B COMPLEX) tablet Take 1 tablet by mouth daily       VITAMIN E PO Take by mouth daily       ZINC SULFATE PO Take by mouth daily         Post Discharge Medication Reconciliation Status: medication reconcilation previously completed during another office visit.    ROS:  Unable to obtain due to cognitive impairment or aphasia    PHYSICAL EXAM:  /80   Pulse 89   Temp 98.6  F (37  C)   Resp 18   SpO2 98%   Gen: sitting in chair, alert, cooperative and in no acute distress  HEENT: normocephalic; oropharynx clear  Card: RRR, S1, S2, no murmurs  Resp: lungs clear to auscultation bilaterally  GI: abdomen soft, non-distended  MSK: normal muscle tone, no LE edema  Neuro: CX II-XII grossly in tact; ROM in all four extremities grossly  in tact  Psych: memory, judgement and insight impaired    LABORATORY/IMAGING DATA:  Reviewed as per Epic    ASSESSMENT/PLAN:    Paroxysmal Atrial Fibrillation  Tachy Drew Syndrome  Presented in RVR. New on metoprolol (dose decreased to 12.5 mg BID due to sinus pause) and diltiazem. HR labile in 70s-150s at TCU, most 110s-130s. Cardiology increased metoprolol on 12/31/18. RRR today with HR in 70s  -- continues on diltiazem 120 mg daily and metoprolol 25 mg BID  -- anticoagulated with apixaban 5 mg BID    Dementia Without Behavioral Disturbance  Acute Delirium  Reported progressive memory loss since March 2018. Psychiatry consulted and clinical picture consistent with dementia with delirium. New on quetiapine.  -- OT following for cog assessment  -- continues on quetiapine 12.5 mg BID and 25 mg qhs as well as 12.5 mg TID PRN      -- goal is to reduce and, if able, discontinued quetiapine    Insomnia  New on melatonin during hospitalization.  -- continues on melatonin 3 mg    Physical Deconditioning  In setting of hospitalization and underlying medical conditions  -- ongoing PT/OT      Electronically signed by:  Rupinder Biggs MD

## 2019-01-07 ENCOUNTER — TELEPHONE (OUTPATIENT)
Dept: CARDIOLOGY | Facility: CLINIC | Age: 84
End: 2019-01-07

## 2019-01-07 ENCOUNTER — NURSING HOME VISIT (OUTPATIENT)
Dept: GERIATRICS | Facility: CLINIC | Age: 84
End: 2019-01-07
Payer: COMMERCIAL

## 2019-01-07 VITALS
SYSTOLIC BLOOD PRESSURE: 139 MMHG | DIASTOLIC BLOOD PRESSURE: 94 MMHG | RESPIRATION RATE: 20 BRPM | OXYGEN SATURATION: 98 % | HEART RATE: 122 BPM | TEMPERATURE: 98.2 F

## 2019-01-07 DIAGNOSIS — I48.0 PAROXYSMAL ATRIAL FIBRILLATION (H): Primary | ICD-10-CM

## 2019-01-07 DIAGNOSIS — G47.01 INSOMNIA DUE TO MEDICAL CONDITION: ICD-10-CM

## 2019-01-07 DIAGNOSIS — R53.81 PHYSICAL DECONDITIONING: ICD-10-CM

## 2019-01-07 DIAGNOSIS — R41.0 DELIRIUM: ICD-10-CM

## 2019-01-07 DIAGNOSIS — I49.5 TACHY-BRADY SYNDROME (H): ICD-10-CM

## 2019-01-07 DIAGNOSIS — F03.90 DEMENTIA WITHOUT BEHAVIORAL DISTURBANCE, UNSPECIFIED DEMENTIA TYPE: ICD-10-CM

## 2019-01-07 PROCEDURE — 99310 SBSQ NF CARE HIGH MDM 45: CPT | Performed by: NURSE PRACTITIONER

## 2019-01-07 NOTE — LETTER
"    2019        RE: Paul Milligan  [address]  [facility] [address]        [place] [facility]    Chief Complaint   Patient presents with     RECHECK       [place] Medical Record Number:  [id number]  Place of Service where encounter took place:  [facility] ([facility]) [[id number]]    HPI:    Paul Milligan is a 83 year old  (1935), who is being seen today for an episodic care visit.  HPI information obtained from: facility chart records, facility staff and patient report.    Today's concern is:  1. Paroxysmal atrial fibrillation (H)    2. Tachy-hugh syndrome (H)    3. Dementia without behavioral disturbance, unspecified dementia type    4. Delirium    5. Insomnia due to medical condition    6. Physical deconditioning      Resident visited today while he was just waking for the day. Unable to obtain review of systems due to cognitive impairment but he does deny pain. Staff reports that he has been congested for the past two days and had a small bloody nose over the weekend. Met with daughter this afternoon and she reports that he appears more confused today and is concerned the seroquel may be contributing to this. He has been blowing his nose more and getting out phlegm. Resident asks for \"a vitamin C\".      BP Readin/94  131/89  136/97    HR:    O2 SATS:  %  Wt Readings from Last 4 Encounters:   18 65.3 kg (143 lb 15.4 oz)   18 65.3 kg (143 lb 15.4 oz)     ALLERGIES: No known allergies  Past Medical, Surgical, Family and Social History reviewed and updated in [facility].    Current Outpatient Medications   Medication Sig Dispense Refill     apixaban ANTICOAGULANT (ELIQUIS) 5 MG tablet Take 1 tablet (5 mg) by mouth 2 times daily 60 tablet 0     diltiazem ER (DILT-XR) 120 MG 24 hr capsule Take 1 capsule (120 mg) by mouth daily 30 capsule 0     melatonin 3 MG tablet Take 1 tablet (3 mg) by mouth At Bedtime 30 tablet 0     QUEtiapine (SEROQUEL) 25 MG tablet Take " 0.5 tablets (12.5 mg) by mouth 3 times daily as needed (for agitation)       QUEtiapine (SEROQUEL) 25 MG tablet Take 0.5 tablets (12.5 mg) by mouth 2 times daily 30 tablet 0     QUEtiapine (SEROQUEL) 25 MG tablet Take 1 tablet (25 mg) by mouth At Bedtime 30 tablet 0     acetaminophen (TYLENOL) 325 MG tablet Take 650 mg by mouth every 4 hours as needed for mild pain for 3 days       Ascorbic Acid (VITAMIN C PO) Take by mouth daily       CALCIUM CARBONATE PO Take by mouth daily       Cholecalciferol (VITAMIN D-3 PO) Take by mouth At Bedtime       COENZYME Q10 PO Take by mouth daily       Digestive Enzymes (PAPAYA ENZYME PO) Take by mouth daily       MAGNESIUM SULFATE PO Take by mouth 2 times daily       metoprolol tartrate (LOPRESSOR) 25 MG tablet Take 0.5 tablets (12.5 mg) by mouth 2 times daily (Patient not taking: Reported on 1/7/2019) 30 tablet 0     Omega-3 Fatty Acids (FISH OIL PO) Take by mouth daily       Saw Palmetto, Serenoa repens, (SAW PALMETTO PO) Take by mouth daily       simethicone (GAS-X) 80 MG chewable tablet Take 80 mg by mouth as needed for flatulence or cramping       VALERIAN ROOT PO Take by mouth At Bedtime       vitamin B complex with vitamin C (VITAMIN  B COMPLEX) tablet Take 1 tablet by mouth daily       VITAMIN E PO Take by mouth daily       ZINC SULFATE PO Take by mouth daily       Medications reviewed:  Medications reconciled to facility chart and changes were made to reflect current medications as identified as above med list. Below are the changes that were made:   Medications stopped since last EPIC medication reconciliation:   There are no discontinued medications.    Medications started since last [facility] medication reconciliation:  No orders of the defined types were placed in this encounter.    REVIEW OF SYSTEMS:  Unobtainable secondary to cognitive impairment.     Physical Exam:  BP (!) 139/94   Pulse 122   Temp 98.2  F (36.8  C)   Resp 20   SpO2 98%   GENERAL APPEARANCE:  Alert,  in no distress, pleasant, cooperative.  EYES:  EOM, lids, pupils and irises normal, sclera clear and conjunctiva normal, no discharge or mattering on lids or lashes noted  ENT:  Mouth normal, moist mucous membranes, nose without drainage or crusting, external ears without lesions, hearing acuity intact  NECK: supple, symmetrical  RESP:  respiratory effort and palpation of chest normal, no chest wall tenderness, no respiratory distress, Lung sounds clear, patient is on room air. Has nasal congestion.   CV:  Palpation and auscultation of heart done, rate and rhythm irregular, no murmur. Edema none in bilateral lower extremities. VASCULAR: warm extremities without open areas.  ABDOMEN:  normal bowel sounds, soft, nontender.  M/S:   Gait and station ambulates independently, no tenderness or swelling of the joints; able to move all extremities   SKIN:  Inspection and palpation of skin and subcutaneous tissue: skin warm, dry and intact without rashes  NEURO: cranial nerves 2-12 grossly intact, no facial asymmetry, no speech deficits and able to follow directions, moves all extremities symmetrically  PSYCH:  insight and judgement intact, memory impaired, affect and mood normal      Recent Labs:   CBC RESULTS:   Recent Labs   Lab Test 01/02/19 12/21/18  1050   WBC 4.9 4.4   RBC 3.81* 4.11*   HGB 12.4* 13.4   HCT 38.4* 40.1   * 98   MCH 32.5 32.6   MCHC 32.3 33.4   RDW 13.2 13.4    233       Last Basic Metabolic Panel:  Recent Labs   Lab Test 01/02/19 12/27/18  0632 12/26/18  0828   *  --  140   POTASSIUM 3.9 4.1 3.9   CHLORIDE 101  --  106   LILIANA 9.2  --  9.2   CO2 26  --  28   BUN 28  --  24   CR 1.03  --  1.12   GLC 94  --  94       TSH   Date Value Ref Range Status   12/21/2018 2.78 0.40 - 4.00 mU/L Final     Assessment/Plan:  Paroxysmal Atrial Fibrillation  Tachy Drew Syndrome  Presented in RVR. New on metoprolol (dose decreased to 12.5 mg BID due to sinus pause) and diltiazem. HR labile at TCU, most  110s-130s. Cardiology increased metoprolol on 12/31/18. HR of 122 today which decreased to 75 after metoprolol  -- continues on diltiazem 120 mg daily and metoprolol 25 mg BID  -- anticoagulated with apixaban 5 mg BID  -- follow up with cardiology     Dementia Without Behavioral Disturbance  Acute Delirium  Reported progressive memory loss since March 2018. Psychiatry consulted and clinical picture consistent with dementia with delirium. New on quetiapine. Daughter request that daytime dosing be reduced or discontinued.   -- OT following for cog assessment  -- discontinue quetiapine 12.5 mg BID. Continue with 25 mg qhs as well as 12.5 mg TID PRN. Will decrease further if able.      Insomnia  New on melatonin during hospitalization. Sleeping well.   -- continues on melatonin 3 mg     Physical Deconditioning  In setting of hospitalization and underlying medical conditions  -- ongoing PT/OT    Total time spent with patient visit was 35 min including patient visit and meeting with daughter. Greater than 50% of total time spent with counseling and coordinating care due to variable heart rates, delirium, and congestion.       Electronically signed by  PAUL Evans CNP                  Sincerely,        PAUL Roy CNP

## 2019-01-07 NOTE — TELEPHONE ENCOUNTER
Kareen, RN from [facility] called, and states family has many questions regarding A. Fib with RVR and requesting f/u with cardiology to discuss options. Pt's HR continues to be variable 's, weakness. Pt was seen by Alicia Salazar NP and Dr. Che while IP. SILVINA KINSEY reportedly wanting our clinic to have update. Phone call transferred to scheduling to schedule OP EP USAMA OVLan Phillips RN.

## 2019-01-07 NOTE — PROGRESS NOTES
"[place] [facility]    Chief Complaint   Patient presents with     DEVAUGHN       [place] Medical Record Number:  [id number]  Place of Service where encounter took place:  [facility] ([facility]) [[id number]]    HPI:    Paul Milligan is a 83 year old  (1935), who is being seen today for an episodic care visit.  HPI information obtained from: facility chart records, facility staff and patient report.    Today's concern is:  1. Paroxysmal atrial fibrillation (H)    2. Tachy-hugh syndrome (H)    3. Dementia without behavioral disturbance, unspecified dementia type    4. Delirium    5. Insomnia due to medical condition    6. Physical deconditioning      Resident visited today while he was just waking for the day. Unable to obtain review of systems due to cognitive impairment but he does deny pain. Staff reports that he has been congested for the past two days and had a small bloody nose over the weekend. Met with daughter this afternoon and she reports that he appears more confused today and is concerned the seroquel may be contributing to this. He has been blowing his nose more and getting out phlegm. Resident asks for \"a vitamin C\".      BP Readin/94  131/89  136/97    HR:    O2 SATS:  %  Wt Readings from Last 4 Encounters:   18 65.3 kg (143 lb 15.4 oz)   18 65.3 kg (143 lb 15.4 oz)     ALLERGIES: No known allergies  Past Medical, Surgical, Family and Social History reviewed and updated in [facility].    Current Outpatient Medications   Medication Sig Dispense Refill     apixaban ANTICOAGULANT (ELIQUIS) 5 MG tablet Take 1 tablet (5 mg) by mouth 2 times daily 60 tablet 0     diltiazem ER (DILT-XR) 120 MG 24 hr capsule Take 1 capsule (120 mg) by mouth daily 30 capsule 0     melatonin 3 MG tablet Take 1 tablet (3 mg) by mouth At Bedtime 30 tablet 0     QUEtiapine (SEROQUEL) 25 MG tablet Take 0.5 tablets (12.5 mg) by mouth 3 times daily as needed (for agitation)       " QUEtiapine (SEROQUEL) 25 MG tablet Take 0.5 tablets (12.5 mg) by mouth 2 times daily 30 tablet 0     QUEtiapine (SEROQUEL) 25 MG tablet Take 1 tablet (25 mg) by mouth At Bedtime 30 tablet 0     acetaminophen (TYLENOL) 325 MG tablet Take 650 mg by mouth every 4 hours as needed for mild pain for 3 days       Ascorbic Acid (VITAMIN C PO) Take by mouth daily       CALCIUM CARBONATE PO Take by mouth daily       Cholecalciferol (VITAMIN D-3 PO) Take by mouth At Bedtime       COENZYME Q10 PO Take by mouth daily       Digestive Enzymes (PAPAYA ENZYME PO) Take by mouth daily       MAGNESIUM SULFATE PO Take by mouth 2 times daily       metoprolol tartrate (LOPRESSOR) 25 MG tablet Take 0.5 tablets (12.5 mg) by mouth 2 times daily (Patient not taking: Reported on 1/7/2019) 30 tablet 0     Omega-3 Fatty Acids (FISH OIL PO) Take by mouth daily       Saw Palmetto, Serenoa repens, (SAW PALMETTO PO) Take by mouth daily       simethicone (GAS-X) 80 MG chewable tablet Take 80 mg by mouth as needed for flatulence or cramping       VALERIAN ROOT PO Take by mouth At Bedtime       vitamin B complex with vitamin C (VITAMIN  B COMPLEX) tablet Take 1 tablet by mouth daily       VITAMIN E PO Take by mouth daily       ZINC SULFATE PO Take by mouth daily       Medications reviewed:  Medications reconciled to facility chart and changes were made to reflect current medications as identified as above med list. Below are the changes that were made:   Medications stopped since last EPIC medication reconciliation:   There are no discontinued medications.    Medications started since last [facility] medication reconciliation:  No orders of the defined types were placed in this encounter.    REVIEW OF SYSTEMS:  Unobtainable secondary to cognitive impairment.     Physical Exam:  BP (!) 139/94   Pulse 122   Temp 98.2  F (36.8  C)   Resp 20   SpO2 98%   GENERAL APPEARANCE:  Alert, in no distress, pleasant, cooperative.  EYES:  EOM, lids, pupils and irises  normal, sclera clear and conjunctiva normal, no discharge or mattering on lids or lashes noted  ENT:  Mouth normal, moist mucous membranes, nose without drainage or crusting, external ears without lesions, hearing acuity intact  NECK: supple, symmetrical  RESP:  respiratory effort and palpation of chest normal, no chest wall tenderness, no respiratory distress, Lung sounds clear, patient is on room air. Has nasal congestion.   CV:  Palpation and auscultation of heart done, rate and rhythm irregular, no murmur. Edema none in bilateral lower extremities. VASCULAR: warm extremities without open areas.  ABDOMEN:  normal bowel sounds, soft, nontender.  M/S:   Gait and station ambulates independently, no tenderness or swelling of the joints; able to move all extremities   SKIN:  Inspection and palpation of skin and subcutaneous tissue: skin warm, dry and intact without rashes  NEURO: cranial nerves 2-12 grossly intact, no facial asymmetry, no speech deficits and able to follow directions, moves all extremities symmetrically  PSYCH:  insight and judgement intact, memory impaired, affect and mood normal      Recent Labs:   CBC RESULTS:   Recent Labs   Lab Test 01/02/19 12/21/18  1050   WBC 4.9 4.4   RBC 3.81* 4.11*   HGB 12.4* 13.4   HCT 38.4* 40.1   * 98   MCH 32.5 32.6   MCHC 32.3 33.4   RDW 13.2 13.4    233       Last Basic Metabolic Panel:  Recent Labs   Lab Test 01/02/19 12/27/18  0632 12/26/18  0828   *  --  140   POTASSIUM 3.9 4.1 3.9   CHLORIDE 101  --  106   LILIANA 9.2  --  9.2   CO2 26  --  28   BUN 28  --  24   CR 1.03  --  1.12   GLC 94  --  94       TSH   Date Value Ref Range Status   12/21/2018 2.78 0.40 - 4.00 mU/L Final     Assessment/Plan:  Paroxysmal Atrial Fibrillation  Tachy Drew Syndrome  Presented in RVR. New on metoprolol (dose decreased to 12.5 mg BID due to sinus pause) and diltiazem. HR labile at TCU, most 110s-130s. Cardiology increased metoprolol on 12/31/18. HR of 122 today  which decreased to 75 after metoprolol  -- continues on diltiazem 120 mg daily and metoprolol 25 mg BID  -- anticoagulated with apixaban 5 mg BID  -- follow up with cardiology     Dementia Without Behavioral Disturbance  Acute Delirium  Reported progressive memory loss since March 2018. Psychiatry consulted and clinical picture consistent with dementia with delirium. New on quetiapine. Daughter request that daytime dosing be reduced or discontinued.   -- OT following for cog assessment  -- discontinue quetiapine 12.5 mg BID. Continue with 25 mg qhs as well as 12.5 mg TID PRN. Will decrease further if able.      Insomnia  New on melatonin during hospitalization. Sleeping well.   -- continues on melatonin 3 mg     Physical Deconditioning  In setting of hospitalization and underlying medical conditions  -- ongoing PT/OT    Total time spent with patient visit was 35 min including patient visit and meeting with daughter. Greater than 50% of total time spent with counseling and coordinating care due to variable heart rates, delirium, and congestion.       Electronically signed by  PAUL Evans CNP

## 2019-01-09 PROBLEM — I50.9 CHF (CONGESTIVE HEART FAILURE) (H): Status: ACTIVE | Noted: 2019-01-09

## 2019-01-09 PROBLEM — F03.90 DEMENTIA (H): Status: ACTIVE | Noted: 2019-01-09

## 2019-01-10 ENCOUNTER — HOSPITAL ENCOUNTER (OUTPATIENT)
Dept: CARDIOLOGY | Facility: CLINIC | Age: 84
Discharge: HOME | End: 2019-01-10
Attending: INTERNAL MEDICINE | Admitting: INTERNAL MEDICINE
Payer: COMMERCIAL

## 2019-01-10 ENCOUNTER — OFFICE VISIT (OUTPATIENT)
Dept: CARDIOLOGY | Facility: CLINIC | Age: 84
End: 2019-01-10
Payer: COMMERCIAL

## 2019-01-10 ENCOUNTER — NURSING HOME VISIT (OUTPATIENT)
Dept: GERIATRICS | Facility: CLINIC | Age: 84
End: 2019-01-10
Payer: COMMERCIAL

## 2019-01-10 VITALS
DIASTOLIC BLOOD PRESSURE: 67 MMHG | HEART RATE: 64 BPM | BODY MASS INDEX: 18.66 KG/M2 | SYSTOLIC BLOOD PRESSURE: 94 MMHG | OXYGEN SATURATION: 96 % | HEIGHT: 74 IN | WEIGHT: 145.4 LBS

## 2019-01-10 VITALS
HEART RATE: 72 BPM | TEMPERATURE: 98.8 F | HEIGHT: 74 IN | WEIGHT: 142.8 LBS | OXYGEN SATURATION: 95 % | BODY MASS INDEX: 18.33 KG/M2 | SYSTOLIC BLOOD PRESSURE: 140 MMHG | DIASTOLIC BLOOD PRESSURE: 69 MMHG | RESPIRATION RATE: 18 BRPM

## 2019-01-10 DIAGNOSIS — I49.5 TACHY-BRADY SYNDROME (H): ICD-10-CM

## 2019-01-10 DIAGNOSIS — F03.90 DEMENTIA WITHOUT BEHAVIORAL DISTURBANCE, UNSPECIFIED DEMENTIA TYPE: ICD-10-CM

## 2019-01-10 DIAGNOSIS — R09.89 CHEST CONGESTION: Primary | ICD-10-CM

## 2019-01-10 DIAGNOSIS — I48.91 ATRIAL FIBRILLATION WITH RVR (H): Primary | ICD-10-CM

## 2019-01-10 DIAGNOSIS — R53.81 MALAISE: ICD-10-CM

## 2019-01-10 DIAGNOSIS — I48.91 ATRIAL FIBRILLATION WITH RVR (H): ICD-10-CM

## 2019-01-10 DIAGNOSIS — I48.0 PAROXYSMAL ATRIAL FIBRILLATION (H): ICD-10-CM

## 2019-01-10 PROCEDURE — 93000 ELECTROCARDIOGRAM COMPLETE: CPT | Performed by: NURSE PRACTITIONER

## 2019-01-10 PROCEDURE — 0296T ZIO PATCH HOLTER ADULT PEDIATRIC GREATER THAN 48 HRS: CPT

## 2019-01-10 PROCEDURE — 99310 SBSQ NF CARE HIGH MDM 45: CPT | Performed by: NURSE PRACTITIONER

## 2019-01-10 PROCEDURE — 0298T ZIO PATCH HOLTER ADULT PEDIATRIC GREATER THAN 48 HRS: CPT | Performed by: INTERNAL MEDICINE

## 2019-01-10 PROCEDURE — 99214 OFFICE O/P EST MOD 30 MIN: CPT | Performed by: NURSE PRACTITIONER

## 2019-01-10 RX ORDER — METOPROLOL TARTRATE 25 MG/1
25 TABLET, FILM COATED ORAL 2 TIMES DAILY
Status: ON HOLD | COMMUNITY
End: 2019-02-14

## 2019-01-10 RX ORDER — QUETIAPINE FUMARATE 25 MG/1
12.5 TABLET, FILM COATED ORAL AT BEDTIME
COMMUNITY
End: 2019-01-28

## 2019-01-10 ASSESSMENT — MIFFLIN-ST. JEOR
SCORE: 1422.49
SCORE: 1434.28

## 2019-01-10 NOTE — PROGRESS NOTES
"[place] [facility]    Chief Complaint   Patient presents with     DEVAUGHN       [place] Medical Record Number:  [id number]  Place of Service where encounter took place:  [facility] ([facility]) [[id number]]    HPI:    Paul Milligan is a 83 year old  (1935), who is being seen today for an episodic care visit.  HPI information obtained from: facility chart records, facility staff and patient report.    Today's concern is:  1. Chest congestion    2. Malaise    3. Paroxysmal atrial fibrillation (H)    4. Tachy-hugh syndrome (H)    5. Dementia without behavioral disturbance, unspecified dementia type       Resident visited today due to ongoing chest congestion. Daughter reports that he has been coughing for the past few days and she has noted that he is coughing when swallowing thin liquids and is concerned for aspiration pneumonia. He reported feeling as he \"was hit by a a bus\". He has been afebrile. Unable to answer if he has body aches or chills but is covered up with two blankets.   BP Readin/69  136/89  136/85    HR:    O2 SATS:  %  Wt Readings from Last 4 Encounters:   01/10/19 64.8 kg (142 lb 12.8 oz)   01/10/19 66 kg (145 lb 6.4 oz)   18 65.3 kg (143 lb 15.4 oz)   18 65.3 kg (143 lb 15.4 oz)     ALLERGIES: No known allergies  Past Medical, Surgical, Family and Social History reviewed and updated in EPIC.    Current Outpatient Medications   Medication Sig Dispense Refill     apixaban ANTICOAGULANT (ELIQUIS) 5 MG tablet Take 1 tablet (5 mg) by mouth 2 times daily 60 tablet 0     Ascorbic Acid (VITAMIN C PO) Take 500 mg by mouth daily        diltiazem ER (DILT-XR) 120 MG 24 hr capsule Take 1 capsule (120 mg) by mouth daily 30 capsule 0     melatonin 3 MG tablet Take 1 tablet (3 mg) by mouth At Bedtime 30 tablet 0     metoprolol tartrate (LOPRESSOR) 25 MG tablet Take 25 mg by mouth 2 times daily Hold HR <60 bpm, hold < 100/60       QUEtiapine (SEROQUEL) 25 MG " tablet Take 12.5 mg by mouth 3 times daily as needed for agitation for 14 days       QUEtiapine (SEROQUEL) 25 MG tablet Take 1 tablet (25 mg) by mouth At Bedtime 30 tablet 0     acetaminophen (TYLENOL) 325 MG tablet Take 650 mg by mouth every 4 hours as needed for mild pain for 3 days       CALCIUM CARBONATE PO Take by mouth daily       Cholecalciferol (VITAMIN D-3 PO) Take by mouth At Bedtime       COENZYME Q10 PO Take by mouth daily       Digestive Enzymes (PAPAYA ENZYME PO) Take by mouth daily       MAGNESIUM SULFATE PO Take by mouth 2 times daily       metoprolol tartrate (LOPRESSOR) 25 MG tablet Take 0.5 tablets (12.5 mg) by mouth 2 times daily (Patient not taking: Reported on 1/10/2019) 30 tablet 0     Omega-3 Fatty Acids (FISH OIL PO) Take by mouth daily       QUEtiapine (SEROQUEL) 25 MG tablet Take 0.5 tablets (12.5 mg) by mouth 3 times daily as needed (for agitation) (Patient not taking: Reported on 1/10/2019)       QUEtiapine (SEROQUEL) 25 MG tablet Take 0.5 tablets (12.5 mg) by mouth 2 times daily (Patient not taking: Reported on 1/10/2019) 30 tablet 0     Saw Palmetto, Serenoa repens, (SAW PALMETTO PO) Take by mouth daily       simethicone (GAS-X) 80 MG chewable tablet Take 80 mg by mouth as needed for flatulence or cramping       VALERIAN ROOT PO Take by mouth At Bedtime       vitamin B complex with vitamin C (VITAMIN  B COMPLEX) tablet Take 1 tablet by mouth daily       VITAMIN E PO Take by mouth daily       ZINC SULFATE PO Take by mouth daily       Medications reconciled to facility chart and changes were made to reflect current medications as identified as above med list. Below are the changes that were made:   Medications stopped since last EPIC medication reconciliation:   There are no discontinued medications.    Medications started since last [facility] medication reconciliation:  Orders Placed This Encounter   Medications     metoprolol tartrate (LOPRESSOR) 25 MG tablet     Sig: Take 25 mg by mouth 2  "times daily Hold HR <60 bpm, hold < 100/60     QUEtiapine (SEROQUEL) 25 MG tablet     Sig: Take 12.5 mg by mouth 3 times daily as needed for agitation for 14 days     REVIEW OF SYSTEMS:  Unobtainable secondary to cognitive impairment.     Physical Exam:  /69   Pulse 72   Temp 98.8  F (37.1  C)   Resp 18   Ht 1.88 m (6' 2\")   Wt 64.8 kg (142 lb 12.8 oz)   SpO2 95%   BMI 18.33 kg/m    GENERAL APPEARANCE:  Alert, tired appearance, dark circles under eyes.   EYES:  EOM, lids, pupils and irises normal, sclera clear and conjunctiva normal, no discharge or mattering on lids or lashes noted  ENT:  Mouth normal, moist mucous membranes, nose without drainage or crusting, external ears without lesions, hearing acuity intact  RESP:  respiratory effort and palpation of chest normal, no chest wall tenderness, no respiratory distress, RML coarse but cleared with coughing. patient is on room air.   CV:  Palpation and auscultation of heart done, rate and rhythm irregular, no murmur. Edema none in bilateral lower extremities. VASCULAR: warm extremities without open areas.  ABDOMEN:  normal bowel sounds, soft, nontender.  M/S:   Gait and station ambulates independently, no tenderness or swelling of the joints; able to move all extremities   SKIN:  Inspection and palpation of skin and subcutaneous tissue: skin warm, dry and intact without rashes  NEURO: cranial nerves 2-12 grossly intact, no facial asymmetry, no speech deficits and able to follow directions, moves all extremities symmetrically  PSYCH:  insight and judgement intact, memory impaired, affect and mood normal      Recent Labs:   CBC RESULTS:   Recent Labs   Lab Test 01/02/19 12/21/18  1050   WBC 4.9 4.4   RBC 3.81* 4.11*   HGB 12.4* 13.4   HCT 38.4* 40.1   * 98   MCH 32.5 32.6   MCHC 32.3 33.4   RDW 13.2 13.4    233       Last Basic Metabolic Panel:  Recent Labs   Lab Test 01/02/19 12/27/18  0632 12/26/18  0828   *  --  140   POTASSIUM 3.9 " 4.1 3.9   CHLORIDE 101  --  106   LILIANA 9.2  --  9.2   CO2 26  --  28   BUN 28  --  24   CR 1.03  --  1.12   GLC 94  --  94       TSH   Date Value Ref Range Status   12/21/2018 2.78 0.40 - 4.00 mU/L Final       Assessment/Plan:  Chest congestion/Malaise  Increasing congestion over the past 2 days with feelings of malaise. No fever or hypoxia but want to rule out pnuemonia given that has been coughing with fluids.   --Chest xray 2 view AP/L today to rule out pneumonia.   --mucinex 600 mg PO q12h x 5 days  --SLP to evaluate swallowing.     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 New on metoprolol and diltiazem. Family wants more aggressive treatment for atrial fibrillation and he was seen by cardiology today. No changes were made to medications today but a ziopatch was placed for three days.   -- continues on diltiazem 120 mg daily and metoprolol 25 mg BID  -- anticoagulated with apixaban 5 mg BID  -- follow up with cardiology (Dr. Che) in 10-14 days     Dementia Without Behavioral Disturbance  Acute Delirium  Reported progressive memory loss since March 2018. Psychiatry consulted and clinical picture consistent with dementia with delirium. New on quetiapine which daytime doses were discontinued earlier this week and daughter requests to have HS dose decreased due to decreased daytime alertness.   - decrease quetiapine to 12.5 mg at qhs as well as 12.5 mg TID PRN.     Total time spent with patient visit was 35 min including patient visit and meeting with daughter. Greater than 50% of total time spent with counseling and coordinating care due to variable heart rates, delirium, and congestion.     Electronically signed by  PAUL Evans CNP

## 2019-01-10 NOTE — LETTER
1/10/2019    Ismael العلي MD  [address]  [place] [address]    RE: Paul Milligan       Dear Colleague,    I had the pleasure of seeing Paul Milligan in the [facility].    HPI:  Paul Milligan is a 83 year old male who presents after hospitalization for confusion and found to be in atrial fibrillation with RVR and he was discharged on 12/28/2018.  Dr. Che saw him in the hospital for     Atrial fibrillation.   He was admitted on 12/21/2018 for confusion, possible UTI and found to be in atrial fibrillation with RVR of unknown duration.  (noted by daughters at OV this is a longstanding problem).  His admission EKG reviewed by Dr. hCe, showed atrial fibrillation with RVR at 142 bpm along with RBBB suggesting a conduction disease.  Due to cognitive deficits patient was not able to communicate symptoms and given his dementia a conservative therapy was taken.     He was started on diltiazem 15 mg IV along with diltiazem  mg daily and metoprolol tartrate 25 mg twice daily.   His heart rate was noted in the 30's at night presumably when sleeping thought to be due to high vagal tone and BB.  His AV node blockers were decreased to diltiazem  mg daily and metoprolol tartrate 12.5 mg twice daily.  At discharge HR's varied between  bpm and with the longest pause was 2.8 seconds with the recommendation of stopping BB for pauses longer than 3 seconds during waking hours.     Other history includes probably Alzheimer's dementia    Diagnostics  ECHO (12/25/2018) revealed EF 45-50% with mildly dilated ascending aorta, with dilated IVC.       He was discharged to LTC and his HR's, per NP note, have been between vary between 50's-130's and then after metoprolol was given his HR decreased to 75 bpm.  It is difficult to assess when the heart rates are being taken in correlation to the medication.  BP have been 130's/80-90's.  Weight has been stable at 143#.    Per  LTC note, there was a request to follow up with cardiology to determine if treatment of diltiazem 120 mg daily and metoprolol tartrate 25 mg BID is effective.   Per the note from the [facility], family wants more aggressive means of treatment for atrial fibrillation.      Today Paul Milligan presents with his 2 daughters, in a wheelchair and his EKG while sitting revealed atrial fibrillation with ventricular rate of 110 bpm.  His palpated HR was noted to be 64 bpm.    He is not a good historian an not able to answer questions, however, half way through the visit he stated he had to go to the restroom.   He appear euvolemic.  He has a cough but his lungs sounded clear with breathing. He was not able to follow commands to take a deep breath.  He was unable to tell me if he is having symptoms of dizziness, lightheadedness, chest pain, or dyspnea.  According to his daughters he has had atrial fibrillation for quite some time and had only been on an aspirin.    His daughters state he has been having a gradual decline in cognition over the past few years however a few weeks ago he had a sharp decline.   They also are willing to pursue a pacemaker and/or AV node ablation if necessary to improve the quality of their father's life.        ASSESSMENT AND PLAN    Atrial fibrillation of unknown duration.  Patient was found to be in atrial fibrillation during admission to the hospital in 12/2018.   Daughters state his atrial fibrillation has been long standing however the H&P presumed his atrial fibrillation was paroxymal.  No available EKG's or data available through care everywhere to verify.  His daughters would like more aggressive treatment of his atrial fibrillation if this improves his quality of life, though patient's significant dementia make this difficult to assess his symptoms.      Plan:    To assess rate control a 3 day zio patch monitor was ordered.  [facility] has documented variable HR's from 's and difficult to  assess when medications has been given in correlations to HR's.   Today in clinic the HR was recorded as 64 bpm and EKG revealed HR of 110 bpm .     Continue with current regimen of diltiazem 120 mg daily and metoprolol 12.5 mg twice daily.  His blood pressure was soft in clinic today which does not allow for increase in AV node blockers.  This was new in comparison to BP at [facility].      Pt will follow up with Dr. Che who saw pt while hospitalized to review zio patch and further discuss long term plan which may include AV node ablation and permanent pacemaker.      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    PAUL Bonner, CNP    This note was completed in part using Dragon voice recognition software. Although reviewed after completion, some word and grammatical errors may occur.    Orders Placed This Encounter   Procedures     Follow-Up with Electrophysiologist     EKG 12-lead complete w/read - Clinics (performed today)     Zio Patch Holter Adult Pediatric Greater than 48 hrs     No orders of the defined types were placed in this encounter.    There are no discontinued medications.      Encounter Diagnosis   Name Primary?     Atrial fibrillation with RVR (H) Yes       CURRENT MEDICATIONS:  Current Outpatient Medications   Medication Sig Dispense Refill     apixaban ANTICOAGULANT (ELIQUIS) 5 MG tablet Take 1 tablet (5 mg) by mouth 2 times daily 60 tablet 0     Ascorbic Acid (VITAMIN C PO) Take 500 mg by mouth daily        diltiazem ER (DILT-XR) 120 MG 24 hr capsule Take 1 capsule (120 mg) by mouth daily 30 capsule 0     melatonin 3 MG tablet Take 1 tablet (3 mg) by mouth At Bedtime 30 tablet 0     metoprolol tartrate (LOPRESSOR) 25 MG tablet Take 0.5 tablets (12.5 mg) by mouth 2 times daily (Patient not  taking: Reported on 1/10/2019) 30 tablet 0     QUEtiapine (SEROQUEL) 25 MG tablet Take 0.5 tablets (12.5 mg) by mouth 3 times daily as needed (for agitation) (Patient not taking: Reported on 1/10/2019)       acetaminophen (TYLENOL) 325 MG tablet Take 650 mg by mouth every 4 hours as needed for mild pain for 3 days       CALCIUM CARBONATE PO Take by mouth daily       Cholecalciferol (VITAMIN D-3 PO) Take by mouth At Bedtime       COENZYME Q10 PO Take by mouth daily       Digestive Enzymes (PAPAYA ENZYME PO) Take by mouth daily       MAGNESIUM SULFATE PO Take by mouth 2 times daily       metoprolol tartrate (LOPRESSOR) 25 MG tablet Take 25 mg by mouth 2 times daily Hold HR <60 bpm, hold < 100/60       Omega-3 Fatty Acids (FISH OIL PO) Take by mouth daily       QUEtiapine (SEROQUEL) 25 MG tablet Take 12.5 mg by mouth 3 times daily as needed for agitation for 14 days       QUEtiapine (SEROQUEL) 25 MG tablet Take 0.5 tablets (12.5 mg) by mouth 2 times daily (Patient not taking: Reported on 1/10/2019) 30 tablet 0     QUEtiapine (SEROQUEL) 25 MG tablet Take 1 tablet (25 mg) by mouth At Bedtime 30 tablet 0     Saw Palmetto, Serenoa repens, (SAW PALMETTO PO) Take by mouth daily       simethicone (GAS-X) 80 MG chewable tablet Take 80 mg by mouth as needed for flatulence or cramping       VALERIAN ROOT PO Take by mouth At Bedtime       vitamin B complex with vitamin C (VITAMIN  B COMPLEX) tablet Take 1 tablet by mouth daily       VITAMIN E PO Take by mouth daily       ZINC SULFATE PO Take by mouth daily         ALLERGIES     Allergies   Allergen Reactions     No Known Allergies        PAST MEDICAL HISTORY:  No past medical history on file.    PAST SURGICAL HISTORY:  Past Surgical History:   Procedure Laterality Date     ORTHOPEDIC SURGERY         FAMILY HISTORY:  No family history on file.    SOCIAL HISTORY:  Social History     Socioeconomic History     Marital status:      Spouse name: None     Number of children: None  "    Years of education: None     Highest education level: None   Social Needs     Financial resource strain: None     Food insecurity - worry: None     Food insecurity - inability: None     Transportation needs - medical: None     Transportation needs - non-medical: None   Occupational History     None   Tobacco Use     Smoking status: Never Smoker     Smokeless tobacco: Never Used   Substance and Sexual Activity     Alcohol use: No     Frequency: Never     Drug use: No     Sexual activity: None   Other Topics Concern     None   Social History Narrative     None       Review of Systems:  Skin:  Negative     Eyes:  Negative    ENT:  Positive for nasal congestion;postnasal drainage;sinus trouble  Respiratory:  Positive for cough;dyspnea on exertion  Cardiovascular:    Positive for;edema;fatigue  Gastroenterology:      Genitourinary:  Positive for urinary frequency;incontinence  Musculoskeletal:  Positive for muscular weakness  Neurologic:  Positive for memory problems  Psychiatric:  not assessed    Heme/Lymph/Imm:  Positive for allergies  Endocrine:  Negative      Physical Exam:  Vitals: BP 94/67   Pulse 64   Ht 1.88 m (6' 2\")   Wt 66 kg (145 lb 6.4 oz)   SpO2 96%   BMI 18.67 kg/m       Constitutional:    thin;frail;cachectic confused, in a wheelchair    Skin:  warm and dry to the touch        Head:  normocephalic        Eyes:           ENT:  no pallor or cyanosis        Neck:  JVP normal        Chest:  clear to auscultation        Cardiac:   irregular rhythm                Abdomen:  abdomen soft        Vascular:                                        Extremities and Back:  no edema        Neurological:             Recent Lab Results:  LIPID RESULTS:  No results found for: CHOL, HDL, LDL, TRIG, CHOLHDLRATIO    LIVER ENZYME RESULTS:  No results found for: AST, ALT    CBC RESULTS:  Lab Results   Component Value Date    WBC 4.9 01/02/2019    RBC 3.81 (L) 01/02/2019    HGB 12.4 (L) 01/02/2019    HCT 38.4 (L) " 01/02/2019     (H) 01/02/2019    MCH 32.5 01/02/2019    MCHC 32.3 01/02/2019    RDW 13.2 01/02/2019     01/02/2019       BMP RESULTS:  Lab Results   Component Value Date     (L) 01/02/2019    POTASSIUM 3.9 01/02/2019    CHLORIDE 101 01/02/2019    CO2 26 01/02/2019    ANIONGAP 8 01/02/2019    GLC 94 01/02/2019    BUN 28 01/02/2019    CR 1.03 01/02/2019    GFRESTIMATED >60 01/02/2019    GFRESTBLACK >60 01/02/2019    LILIANA 9.2 01/02/2019        A1C RESULTS:  No results found for: A1C    INR RESULTS:  No results found for: INR                      Thank you for allowing me to participate in the care of your patient.    Sincerely,     PAUL Yadav [facility]

## 2019-01-10 NOTE — LETTER
1/10/2019    Ismael العلي MD  [address]  [place] [address]    RE: Paul Milligan       Dear Colleague,    I had the pleasure of seeing Paul Milligan in the [facility].    HPI:  Paul Milligan is a 83 year old male who presents after hospitalization for confusion and found to be in atrial fibrillation with RVR and he was discharged on 12/28/2018.  Dr. Che saw him in the hospital for     Atrial fibrillation.   He was admitted on 12/21/2018 for confusion, possible UTI and found to be in atrial fibrillation with RVR of unknown duration.  (noted by daughters at OV this is a longstanding problem).  His admission EKG reviewed by Dr. Che, showed atrial fibrillation with RVR at 142 bpm along with RBBB suggesting a conduction disease.  Due to cognitive deficits patient was not able to communicate symptoms and given his dementia a conservative therapy was taken.     He was started on diltiazem 15 mg IV along with diltiazem  mg daily and metoprolol tartrate 25 mg twice daily.   His heart rate was noted in the 30's at night presumably when sleeping thought to be due to high vagal tone and BB.  His AV node blockers were decreased to diltiazem  mg daily and metoprolol tartrate 12.5 mg twice daily.  At discharge HR's varied between  bpm and with the longest pause was 2.8 seconds with the recommendation of stopping BB for pauses longer than 3 seconds during waking hours.     Other history includes probably Alzheimer's dementia    Diagnostics  ECHO (12/25/2018) revealed EF 45-50% with mildly dilated ascending aorta, with dilated IVC.       He was discharged to LTC and his HR's, per NP note, have been between vary between 50's-130's and then after metoprolol was given his HR decreased to 75 bpm.  It is difficult to assess when the heart rates are being taken in correlation to the medication.  BP have been 130's/80-90's.  Weight has been stable at 143#.    Per  LTC note, there was a request to follow up with cardiology to determine if treatment of diltiazem 120 mg daily and metoprolol tartrate 25 mg BID is effective.   Per the note from the [facility], family wants more aggressive means of treatment for atrial fibrillation.      Today Paul Milligan presents with his 2 daughters, in a wheelchair and his EKG while sitting revealed atrial fibrillation with ventricular rate of 110 bpm.  His palpated HR was noted to be 64 bpm.    He is not a good historian an not able to answer questions, however, half way through the visit he stated he had to go to the restroom.   He appear euvolemic.  He has a cough but his lungs sounded clear with breathing. He was not able to follow commands to take a deep breath.  He was unable to tell me if he is having symptoms of dizziness, lightheadedness, chest pain, or dyspnea.  According to his daughters he has had atrial fibrillation for quite some time and had only been on an aspirin.    His daughters state he has been having a gradual decline in cognition over the past few years however a few weeks ago he had a sharp decline.   They also are willing to pursue a pacemaker and/or AV node ablation if necessary to improve the quality of their father's life.        ASSESSMENT AND PLAN    Atrial fibrillation of unknown duration.  Patient was found to be in atrial fibrillation during admission to the hospital in 12/2018.   Daughters state his atrial fibrillation has been long standing however the H&P presumed his atrial fibrillation was paroxymal.  No available EKG's or data available through care everywhere to verify.  His daughters would like more aggressive treatment of his atrial fibrillation if this improves his quality of life, though patient's significant dementia make this difficult to assess his symptoms.      Plan:    To assess rate control a 3 day zio patch monitor was ordered.  [facility] has documented variable HR's from 's and difficult to  assess when medications has been given in correlations to HR's.   Today in clinic the HR was recorded as 64 bpm and EKG revealed HR of 110 bpm .     Continue with current regimen of diltiazem 120 mg daily and metoprolol 12.5 mg twice daily.  His blood pressure was soft in clinic today which does not allow for increase in AV node blockers.  This was new in comparison to BP at [facility].      Pt will follow up with Dr. Che who saw pt while hospitalized to review zio patch and further discuss long term plan which may include AV node ablation and permanent pacemaker.      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    PAUL Bonner, CNP    This note was completed in part using Dragon voice recognition software. Although reviewed after completion, some word and grammatical errors may occur.    Orders Placed This Encounter   Procedures     Follow-Up with Electrophysiologist     EKG 12-lead complete w/read - Clinics (performed today)     Zio Patch Holter Adult Pediatric Greater than 48 hrs     No orders of the defined types were placed in this encounter.    There are no discontinued medications.      Encounter Diagnosis   Name Primary?     Atrial fibrillation with RVR (H) Yes       CURRENT MEDICATIONS:  Current Outpatient Medications   Medication Sig Dispense Refill     apixaban ANTICOAGULANT (ELIQUIS) 5 MG tablet Take 1 tablet (5 mg) by mouth 2 times daily 60 tablet 0     Ascorbic Acid (VITAMIN C PO) Take 500 mg by mouth daily        diltiazem ER (DILT-XR) 120 MG 24 hr capsule Take 1 capsule (120 mg) by mouth daily 30 capsule 0     melatonin 3 MG tablet Take 1 tablet (3 mg) by mouth At Bedtime 30 tablet 0     metoprolol tartrate (LOPRESSOR) 25 MG tablet Take 0.5 tablets (12.5 mg) by mouth 2 times daily (Patient not  taking: Reported on 1/10/2019) 30 tablet 0     QUEtiapine (SEROQUEL) 25 MG tablet Take 0.5 tablets (12.5 mg) by mouth 3 times daily as needed (for agitation) (Patient not taking: Reported on 1/10/2019)       acetaminophen (TYLENOL) 325 MG tablet Take 650 mg by mouth every 4 hours as needed for mild pain for 3 days       CALCIUM CARBONATE PO Take by mouth daily       Cholecalciferol (VITAMIN D-3 PO) Take by mouth At Bedtime       COENZYME Q10 PO Take by mouth daily       Digestive Enzymes (PAPAYA ENZYME PO) Take by mouth daily       MAGNESIUM SULFATE PO Take by mouth 2 times daily       metoprolol tartrate (LOPRESSOR) 25 MG tablet Take 25 mg by mouth 2 times daily Hold HR <60 bpm, hold < 100/60       Omega-3 Fatty Acids (FISH OIL PO) Take by mouth daily       QUEtiapine (SEROQUEL) 25 MG tablet Take 12.5 mg by mouth 3 times daily as needed for agitation for 14 days       QUEtiapine (SEROQUEL) 25 MG tablet Take 0.5 tablets (12.5 mg) by mouth 2 times daily (Patient not taking: Reported on 1/10/2019) 30 tablet 0     QUEtiapine (SEROQUEL) 25 MG tablet Take 1 tablet (25 mg) by mouth At Bedtime 30 tablet 0     Saw Palmetto, Serenoa repens, (SAW PALMETTO PO) Take by mouth daily       simethicone (GAS-X) 80 MG chewable tablet Take 80 mg by mouth as needed for flatulence or cramping       VALERIAN ROOT PO Take by mouth At Bedtime       vitamin B complex with vitamin C (VITAMIN  B COMPLEX) tablet Take 1 tablet by mouth daily       VITAMIN E PO Take by mouth daily       ZINC SULFATE PO Take by mouth daily         ALLERGIES     Allergies   Allergen Reactions     No Known Allergies        PAST MEDICAL HISTORY:  No past medical history on file.    PAST SURGICAL HISTORY:  Past Surgical History:   Procedure Laterality Date     ORTHOPEDIC SURGERY         FAMILY HISTORY:  No family history on file.    SOCIAL HISTORY:  Social History     Socioeconomic History     Marital status:      Spouse name: None     Number of children: None  "    Years of education: None     Highest education level: None   Social Needs     Financial resource strain: None     Food insecurity - worry: None     Food insecurity - inability: None     Transportation needs - medical: None     Transportation needs - non-medical: None   Occupational History     None   Tobacco Use     Smoking status: Never Smoker     Smokeless tobacco: Never Used   Substance and Sexual Activity     Alcohol use: No     Frequency: Never     Drug use: No     Sexual activity: None   Other Topics Concern     None   Social History Narrative     None       Review of Systems:  Skin:  Negative     Eyes:  Negative    ENT:  Positive for nasal congestion;postnasal drainage;sinus trouble  Respiratory:  Positive for cough;dyspnea on exertion  Cardiovascular:    Positive for;edema;fatigue  Gastroenterology:      Genitourinary:  Positive for urinary frequency;incontinence  Musculoskeletal:  Positive for muscular weakness  Neurologic:  Positive for memory problems  Psychiatric:  not assessed    Heme/Lymph/Imm:  Positive for allergies  Endocrine:  Negative      Physical Exam:  Vitals: BP 94/67   Pulse 64   Ht 1.88 m (6' 2\")   Wt 66 kg (145 lb 6.4 oz)   SpO2 96%   BMI 18.67 kg/m       Constitutional:    thin;frail;cachectic confused, in a wheelchair    Skin:  warm and dry to the touch        Head:  normocephalic        Eyes:           ENT:  no pallor or cyanosis        Neck:  JVP normal        Chest:  clear to auscultation        Cardiac:   irregular rhythm                Abdomen:  abdomen soft        Vascular:                                        Extremities and Back:  no edema        Neurological:             Recent Lab Results:  LIPID RESULTS:  No results found for: CHOL, HDL, LDL, TRIG, CHOLHDLRATIO    LIVER ENZYME RESULTS:  No results found for: AST, ALT    CBC RESULTS:  Lab Results   Component Value Date    WBC 4.9 01/02/2019    RBC 3.81 (L) 01/02/2019    HGB 12.4 (L) 01/02/2019    HCT 38.4 (L) " 01/02/2019     (H) 01/02/2019    MCH 32.5 01/02/2019    MCHC 32.3 01/02/2019    RDW 13.2 01/02/2019     01/02/2019       BMP RESULTS:  Lab Results   Component Value Date     (L) 01/02/2019    POTASSIUM 3.9 01/02/2019    CHLORIDE 101 01/02/2019    CO2 26 01/02/2019    ANIONGAP 8 01/02/2019    GLC 94 01/02/2019    BUN 28 01/02/2019    CR 1.03 01/02/2019    GFRESTIMATED >60 01/02/2019    GFRESTBLACK >60 01/02/2019    LILIANA 9.2 01/02/2019        A1C RESULTS:  No results found for: A1C    INR RESULTS:  No results found for: INR        CC  No referring provider defined for this encounter.                  Thank you for allowing me to participate in the care of your patient.      Sincerely,     PAUL Yadav [facility]    cc:   No referring provider defined for this encounter.

## 2019-01-10 NOTE — PROGRESS NOTES
HPI:  Paul Milligan is a 83 year old male who presents after hospitalization for confusion and found to be in atrial fibrillation with RVR and he was discharged on 12/28/2018.  Dr. Che saw him in the hospital for     Atrial fibrillation.   He was admitted on 12/21/2018 for confusion, possible UTI and found to be in atrial fibrillation with RVR of unknown duration.  (noted by daughters at OV this is a longstanding problem).  His admission EKG reviewed by Dr. Che, showed atrial fibrillation with RVR at 142 bpm along with RBBB suggesting a conduction disease.  Due to cognitive deficits patient was not able to communicate symptoms and given his dementia a conservative therapy was taken.     He was started on diltiazem 15 mg IV along with diltiazem  mg daily and metoprolol tartrate 25 mg twice daily.   His heart rate was noted in the 30's at night presumably when sleeping thought to be due to high vagal tone and BB.  His AV node blockers were decreased to diltiazem  mg daily and metoprolol tartrate 12.5 mg twice daily.  At discharge HR's varied between  bpm and with the longest pause was 2.8 seconds with the recommendation of stopping BB for pauses longer than 3 seconds during waking hours.     Other history includes probably Alzheimer's dementia    Diagnostics  ECHO (12/25/2018) revealed EF 45-50% with mildly dilated ascending aorta, with dilated IVC.       He was discharged to LTC and his HR's, per NP note, have been between vary between 50's-130's and then after metoprolol was given his HR decreased to 75 bpm.  It is difficult to assess when the heart rates are being taken in correlation to the medication.  BP have been 130's/80-90's.  Weight has been stable at 143#.    Per LTC note, there was a request to follow up with cardiology to determine if treatment of diltiazem 120 mg daily and metoprolol tartrate 25 mg BID is effective.   Per the note from the LTC, family wants more aggressive means of  treatment for atrial fibrillation.      Today Paul Milligan presents with his 2 daughters, in a wheelchair and his EKG while sitting revealed atrial fibrillation with ventricular rate of 110 bpm.  His palpated HR was noted to be 64 bpm.    He is not a good historian an not able to answer questions, however, half way through the visit he stated he had to go to the restroom.   He appear euvolemic.  He has a cough but his lungs sounded clear with breathing. He was not able to follow commands to take a deep breath.  He was unable to tell me if he is having symptoms of dizziness, lightheadedness, chest pain, or dyspnea.  According to his daughters he has had atrial fibrillation for quite some time and had only been on an aspirin.    His daughters state he has been having a gradual decline in cognition over the past few years however a few weeks ago he had a sharp decline.   They also are willing to pursue a pacemaker and/or AV node ablation if necessary to improve the quality of their father's life.        ASSESSMENT AND PLAN    Atrial fibrillation of unknown duration.  Patient was found to be in atrial fibrillation during admission to the hospital in 12/2018.   Daughters state his atrial fibrillation has been long standing however the H&P presumed his atrial fibrillation was paroxymal.  No available EKG's or data available through care everywhere to verify.  His daughters would like more aggressive treatment of his atrial fibrillation if this improves his quality of life, though patient's significant dementia make this difficult to assess his symptoms.      Plan:    To assess rate control a 3 day zio patch monitor was ordered.  LTC has documented variable HR's from 's and difficult to assess when medications has been given in correlations to HR's.   Today in clinic the HR was recorded as 64 bpm and EKG revealed HR of 110 bpm .     Continue with current regimen of diltiazem 120 mg daily and metoprolol 12.5 mg  twice daily.  His blood pressure was soft in clinic today which does not allow for increase in AV node blockers.  This was new in comparison to BP at LTC.      Pt will follow up with Dr. Che who saw pt while hospitalized to review zio patch and further discuss long term plan which may include AV node ablation and permanent pacemaker.      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    Alicia Salazar, APRN, CNP    This note was completed in part using Dragon voice recognition software. Although reviewed after completion, some word and grammatical errors may occur.    Orders Placed This Encounter   Procedures     Follow-Up with Electrophysiologist     EKG 12-lead complete w/read - Clinics (performed today)     Zio Patch Holter Adult Pediatric Greater than 48 hrs     No orders of the defined types were placed in this encounter.    There are no discontinued medications.      Encounter Diagnosis   Name Primary?     Atrial fibrillation with RVR (H) Yes       CURRENT MEDICATIONS:  Current Outpatient Medications   Medication Sig Dispense Refill     apixaban ANTICOAGULANT (ELIQUIS) 5 MG tablet Take 1 tablet (5 mg) by mouth 2 times daily 60 tablet 0     Ascorbic Acid (VITAMIN C PO) Take 500 mg by mouth daily        diltiazem ER (DILT-XR) 120 MG 24 hr capsule Take 1 capsule (120 mg) by mouth daily 30 capsule 0     melatonin 3 MG tablet Take 1 tablet (3 mg) by mouth At Bedtime 30 tablet 0     metoprolol tartrate (LOPRESSOR) 25 MG tablet Take 0.5 tablets (12.5 mg) by mouth 2 times daily (Patient not taking: Reported on 1/10/2019) 30 tablet 0     QUEtiapine (SEROQUEL) 25 MG tablet Take 0.5 tablets (12.5 mg) by mouth 3 times daily as needed (for agitation) (Patient not taking: Reported on 1/10/2019)       acetaminophen (TYLENOL)  325 MG tablet Take 650 mg by mouth every 4 hours as needed for mild pain for 3 days       CALCIUM CARBONATE PO Take by mouth daily       Cholecalciferol (VITAMIN D-3 PO) Take by mouth At Bedtime       COENZYME Q10 PO Take by mouth daily       Digestive Enzymes (PAPAYA ENZYME PO) Take by mouth daily       MAGNESIUM SULFATE PO Take by mouth 2 times daily       metoprolol tartrate (LOPRESSOR) 25 MG tablet Take 25 mg by mouth 2 times daily Hold HR <60 bpm, hold < 100/60       Omega-3 Fatty Acids (FISH OIL PO) Take by mouth daily       QUEtiapine (SEROQUEL) 25 MG tablet Take 12.5 mg by mouth 3 times daily as needed for agitation for 14 days       QUEtiapine (SEROQUEL) 25 MG tablet Take 0.5 tablets (12.5 mg) by mouth 2 times daily (Patient not taking: Reported on 1/10/2019) 30 tablet 0     QUEtiapine (SEROQUEL) 25 MG tablet Take 1 tablet (25 mg) by mouth At Bedtime 30 tablet 0     Saw Palmetto, Serenoa repens, (SAW PALMETTO PO) Take by mouth daily       simethicone (GAS-X) 80 MG chewable tablet Take 80 mg by mouth as needed for flatulence or cramping       VALERIAN ROOT PO Take by mouth At Bedtime       vitamin B complex with vitamin C (VITAMIN  B COMPLEX) tablet Take 1 tablet by mouth daily       VITAMIN E PO Take by mouth daily       ZINC SULFATE PO Take by mouth daily         ALLERGIES     Allergies   Allergen Reactions     No Known Allergies        PAST MEDICAL HISTORY:  No past medical history on file.    PAST SURGICAL HISTORY:  Past Surgical History:   Procedure Laterality Date     ORTHOPEDIC SURGERY         FAMILY HISTORY:  No family history on file.    SOCIAL HISTORY:  Social History     Socioeconomic History     Marital status:      Spouse name: None     Number of children: None     Years of education: None     Highest education level: None   Social Needs     Financial resource strain: None     Food insecurity - worry: None     Food insecurity - inability: None     Transportation needs - medical: None      "Transportation needs - non-medical: None   Occupational History     None   Tobacco Use     Smoking status: Never Smoker     Smokeless tobacco: Never Used   Substance and Sexual Activity     Alcohol use: No     Frequency: Never     Drug use: No     Sexual activity: None   Other Topics Concern     None   Social History Narrative     None       Review of Systems:  Skin:  Negative     Eyes:  Negative    ENT:  Positive for nasal congestion;postnasal drainage;sinus trouble  Respiratory:  Positive for cough;dyspnea on exertion  Cardiovascular:    Positive for;edema;fatigue  Gastroenterology:      Genitourinary:  Positive for urinary frequency;incontinence  Musculoskeletal:  Positive for muscular weakness  Neurologic:  Positive for memory problems  Psychiatric:  not assessed    Heme/Lymph/Imm:  Positive for allergies  Endocrine:  Negative      Physical Exam:  Vitals: BP 94/67   Pulse 64   Ht 1.88 m (6' 2\")   Wt 66 kg (145 lb 6.4 oz)   SpO2 96%   BMI 18.67 kg/m      Constitutional:    thin;frail;cachectic confused, in a wheelchair    Skin:  warm and dry to the touch        Head:  normocephalic        Eyes:           ENT:  no pallor or cyanosis        Neck:  JVP normal        Chest:  clear to auscultation        Cardiac:   irregular rhythm                Abdomen:  abdomen soft        Vascular:                                        Extremities and Back:  no edema        Neurological:             Recent Lab Results:  LIPID RESULTS:  No results found for: CHOL, HDL, LDL, TRIG, CHOLHDLRATIO    LIVER ENZYME RESULTS:  No results found for: AST, ALT    CBC RESULTS:  Lab Results   Component Value Date    WBC 4.9 01/02/2019    RBC 3.81 (L) 01/02/2019    HGB 12.4 (L) 01/02/2019    HCT 38.4 (L) 01/02/2019     (H) 01/02/2019    MCH 32.5 01/02/2019    MCHC 32.3 01/02/2019    RDW 13.2 01/02/2019     01/02/2019       BMP RESULTS:  Lab Results   Component Value Date     (L) 01/02/2019    POTASSIUM 3.9 01/02/2019    " CHLORIDE 101 01/02/2019    CO2 26 01/02/2019    ANIONGAP 8 01/02/2019    GLC 94 01/02/2019    BUN 28 01/02/2019    CR 1.03 01/02/2019    GFRESTIMATED >60 01/02/2019    GFRESTBLACK >60 01/02/2019    LILIANA 9.2 01/02/2019        A1C RESULTS:  No results found for: A1C    INR RESULTS:  No results found for: INR        CC  No referring provider defined for this encounter.

## 2019-01-10 NOTE — LETTER
"    1/10/2019        RE: Paul Milligan  [address]  [facility] [address]        [facility]    Chief Complaint   Patient presents with     MADDIEECK       [place] Medical Record Number:  [id number]  Place of Service where encounter took place:  [facility] ([facility]) [[id number]]    HPI:    Paul Milligan is a 83 year old  (1935), who is being seen today for an episodic care visit.  HPI information obtained from: facility chart records, facility staff and patient report.    Today's concern is:  1. Chest congestion    2. Malaise    3. Paroxysmal atrial fibrillation (H)    4. Tachy-hugh syndrome (H)    5. Dementia without behavioral disturbance, unspecified dementia type       Resident visited today due to ongoing chest congestion. Daughter reports that he has been coughing for the past few days and she has noted that he is coughing when swallowing thin liquids and is concerned for aspiration pneumonia. He reported feeling as he \"was hit by a a bus\". He has been afebrile. Unable to answer if he has body aches or chills but is covered up with two blankets.   BP Readin/69  136/89  136/85    HR:    O2 SATS:  %  Wt Readings from Last 4 Encounters:   01/10/19 64.8 kg (142 lb 12.8 oz)   01/10/19 66 kg (145 lb 6.4 oz)   18 65.3 kg (143 lb 15.4 oz)   18 65.3 kg (143 lb 15.4 oz)     ALLERGIES: No known allergies  Past Medical, Surgical, Family and Social History reviewed and updated in [facility].    Current Outpatient Medications   Medication Sig Dispense Refill     apixaban ANTICOAGULANT (ELIQUIS) 5 MG tablet Take 1 tablet (5 mg) by mouth 2 times daily 60 tablet 0     Ascorbic Acid (VITAMIN C PO) Take 500 mg by mouth daily        diltiazem ER (DILT-XR) 120 MG 24 hr capsule Take 1 capsule (120 mg) by mouth daily 30 capsule 0     melatonin 3 MG tablet Take 1 tablet (3 mg) by mouth At Bedtime 30 tablet 0     metoprolol tartrate (LOPRESSOR) 25 MG tablet Take 25 mg by " mouth 2 times daily Hold HR <60 bpm, hold < 100/60       QUEtiapine (SEROQUEL) 25 MG tablet Take 12.5 mg by mouth 3 times daily as needed for agitation for 14 days       QUEtiapine (SEROQUEL) 25 MG tablet Take 1 tablet (25 mg) by mouth At Bedtime 30 tablet 0     acetaminophen (TYLENOL) 325 MG tablet Take 650 mg by mouth every 4 hours as needed for mild pain for 3 days       CALCIUM CARBONATE PO Take by mouth daily       Cholecalciferol (VITAMIN D-3 PO) Take by mouth At Bedtime       COENZYME Q10 PO Take by mouth daily       Digestive Enzymes (PAPAYA ENZYME PO) Take by mouth daily       MAGNESIUM SULFATE PO Take by mouth 2 times daily       metoprolol tartrate (LOPRESSOR) 25 MG tablet Take 0.5 tablets (12.5 mg) by mouth 2 times daily (Patient not taking: Reported on 1/10/2019) 30 tablet 0     Omega-3 Fatty Acids (FISH OIL PO) Take by mouth daily       QUEtiapine (SEROQUEL) 25 MG tablet Take 0.5 tablets (12.5 mg) by mouth 3 times daily as needed (for agitation) (Patient not taking: Reported on 1/10/2019)       QUEtiapine (SEROQUEL) 25 MG tablet Take 0.5 tablets (12.5 mg) by mouth 2 times daily (Patient not taking: Reported on 1/10/2019) 30 tablet 0     Saw Palmetto, Serenoa repens, (SAW PALMETTO PO) Take by mouth daily       simethicone (GAS-X) 80 MG chewable tablet Take 80 mg by mouth as needed for flatulence or cramping       VALERIAN ROOT PO Take by mouth At Bedtime       vitamin B complex with vitamin C (VITAMIN  B COMPLEX) tablet Take 1 tablet by mouth daily       VITAMIN E PO Take by mouth daily       ZINC SULFATE PO Take by mouth daily       Medications reconciled to facility chart and changes were made to reflect current medications as identified as above med list. Below are the changes that were made:   Medications stopped since last EPIC medication reconciliation:   There are no discontinued medications.    Medications started since last [facility] medication reconciliation:  Orders Placed This Encounter  "  Medications     metoprolol tartrate (LOPRESSOR) 25 MG tablet     Sig: Take 25 mg by mouth 2 times daily Hold HR <60 bpm, hold < 100/60     QUEtiapine (SEROQUEL) 25 MG tablet     Sig: Take 12.5 mg by mouth 3 times daily as needed for agitation for 14 days     REVIEW OF SYSTEMS:  Unobtainable secondary to cognitive impairment.     Physical Exam:  /69   Pulse 72   Temp 98.8  F (37.1  C)   Resp 18   Ht 1.88 m (6' 2\")   Wt 64.8 kg (142 lb 12.8 oz)   SpO2 95%   BMI 18.33 kg/m     GENERAL APPEARANCE:  Alert, tired appearance, dark circles under eyes.   EYES:  EOM, lids, pupils and irises normal, sclera clear and conjunctiva normal, no discharge or mattering on lids or lashes noted  ENT:  Mouth normal, moist mucous membranes, nose without drainage or crusting, external ears without lesions, hearing acuity intact  RESP:  respiratory effort and palpation of chest normal, no chest wall tenderness, no respiratory distress, RML coarse but cleared with coughing. patient is on room air.   CV:  Palpation and auscultation of heart done, rate and rhythm irregular, no murmur. Edema none in bilateral lower extremities. VASCULAR: warm extremities without open areas.  ABDOMEN:  normal bowel sounds, soft, nontender.  M/S:   Gait and station ambulates independently, no tenderness or swelling of the joints; able to move all extremities   SKIN:  Inspection and palpation of skin and subcutaneous tissue: skin warm, dry and intact without rashes  NEURO: cranial nerves 2-12 grossly intact, no facial asymmetry, no speech deficits and able to follow directions, moves all extremities symmetrically  PSYCH:  insight and judgement intact, memory impaired, affect and mood normal      Recent Labs:   CBC RESULTS:   Recent Labs   Lab Test 01/02/19 12/21/18  1050   WBC 4.9 4.4   RBC 3.81* 4.11*   HGB 12.4* 13.4   HCT 38.4* 40.1   * 98   MCH 32.5 32.6   MCHC 32.3 33.4   RDW 13.2 13.4    233       Last Basic Metabolic " Panel:  Recent Labs   Lab Test 01/02/19 12/27/18  0632 12/26/18  0828   *  --  140   POTASSIUM 3.9 4.1 3.9   CHLORIDE 101  --  106   LILIANA 9.2  --  9.2   CO2 26  --  28   BUN 28  --  24   CR 1.03  --  1.12   GLC 94  --  94       TSH   Date Value Ref Range Status   12/21/2018 2.78 0.40 - 4.00 mU/L Final       Assessment/Plan:  Chest congestion/Malaise  Increasing congestion over the past 2 days with feelings of malaise. No fever or hypoxia but want to rule out pnuemonia given that has been coughing with fluids.   --Chest xray 2 view AP/L today to rule out pneumonia.   --mucinex 600 mg PO q12h x 5 days  --SLP to evaluate swallowing.     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 New on metoprolol and diltiazem. Family wants more aggressive treatment for atrial fibrillation and he was seen by cardiology today. No changes were made to medications today but a ziopatch was placed for three days.   -- continues on diltiazem 120 mg daily and metoprolol 25 mg BID  -- anticoagulated with apixaban 5 mg BID  -- follow up with cardiology (Dr. Che) in 10-14 days     Dementia Without Behavioral Disturbance  Acute Delirium  Reported progressive memory loss since March 2018. Psychiatry consulted and clinical picture consistent with dementia with delirium. New on quetiapine which daytime doses were discontinued earlier this week and daughter requests to have HS dose decreased due to decreased daytime alertness.   - decrease quetiapine to 12.5 mg at qhs as well as 12.5 mg TID PRN.     Total time spent with patient visit was 35 min including patient visit and meeting with daughter. Greater than 50% of total time spent with counseling and coordinating care due to variable heart rates, delirium, and congestion.     Electronically signed by  PAUL Evans CNP                Sincerely,        PAUL Roy CNP

## 2019-01-14 ENCOUNTER — NURSING HOME VISIT (OUTPATIENT)
Dept: GERIATRICS | Facility: CLINIC | Age: 84
End: 2019-01-14
Payer: COMMERCIAL

## 2019-01-14 VITALS
RESPIRATION RATE: 18 BRPM | TEMPERATURE: 98.1 F | HEIGHT: 74 IN | OXYGEN SATURATION: 97 % | DIASTOLIC BLOOD PRESSURE: 75 MMHG | HEART RATE: 66 BPM | BODY MASS INDEX: 18.24 KG/M2 | WEIGHT: 142.1 LBS | SYSTOLIC BLOOD PRESSURE: 115 MMHG

## 2019-01-14 DIAGNOSIS — I49.5 TACHY-BRADY SYNDROME (H): ICD-10-CM

## 2019-01-14 DIAGNOSIS — I48.0 PAROXYSMAL ATRIAL FIBRILLATION (H): ICD-10-CM

## 2019-01-14 DIAGNOSIS — J18.9 PNEUMONIA OF LEFT LOWER LOBE DUE TO INFECTIOUS ORGANISM: Primary | ICD-10-CM

## 2019-01-14 DIAGNOSIS — F03.90 DEMENTIA WITHOUT BEHAVIORAL DISTURBANCE, UNSPECIFIED DEMENTIA TYPE: ICD-10-CM

## 2019-01-14 PROCEDURE — 99309 SBSQ NF CARE MODERATE MDM 30: CPT | Performed by: NURSE PRACTITIONER

## 2019-01-14 RX ORDER — GUAIFENESIN 600 MG/1
600 TABLET, EXTENDED RELEASE ORAL 2 TIMES DAILY
COMMUNITY
End: 2019-02-04

## 2019-01-14 RX ORDER — LEVOFLOXACIN 500 MG/1
500 TABLET, FILM COATED ORAL DAILY
COMMUNITY
End: 2019-02-04

## 2019-01-14 ASSESSMENT — MIFFLIN-ST. JEOR: SCORE: 1419.31

## 2019-01-14 NOTE — LETTER
"    2019        RE: Paul Milligan  [address]  [facility] [address]        [facility]    Chief Complaint   Patient presents with     RECHECK       [place] Medical Record Number:  [id number]  Place of Service where encounter took place:  [facility] ([facility]) [[id number]]    HPI:    Paul Milligan is a 83 year old  (1935), who is being seen today for an episodic care visit.  HPI information obtained from: facility chart records, facility staff and patient report.    Pneumonia of left lung due to infectious organism, unspecified part of lung  Chest xray obtained last week and resulted with \"patchy opacity at the left lung base\". Started on levaquin. He has been afebrile and oxygen saturations have been 94-97% on room air over the weekend.    Paroxysmal atrial fibrillation (H)  Tachy-hguh syndrome (H)  Followed up with cardiology last week and a ziopatch was place.   BP Readin/75  123/84  104/70  HR:   over the weekend.   Wt Readings from Last 4 Encounters:   19 64.5 kg (142 lb 1.6 oz)   01/10/19 64.8 kg (142 lb 12.8 oz)   01/10/19 66 kg (145 lb 6.4 oz)   18 65.3 kg (143 lb 15.4 oz)     Dementia without behavioral disturbance, unspecified dementia type  Daytime Seroqeul was weaned off last week and HS seroquel was decreased to 12.5 mg. Has been sleeping well at night. Therapy reports today that he is nodding off during therapy.    ALLERGIES: No known allergies  Past Medical, Surgical, Family and Social History reviewed and updated in [facility].    Current Outpatient Medications   Medication Sig Dispense Refill     apixaban ANTICOAGULANT (ELIQUIS) 5 MG tablet Take 1 tablet (5 mg) by mouth 2 times daily 60 tablet 0     Ascorbic Acid (VITAMIN C PO) Take 500 mg by mouth daily        diltiazem ER (DILT-XR) 120 MG 24 hr capsule Take 1 capsule (120 mg) by mouth daily 30 capsule 0     guaiFENesin (MUCINEX) 600 MG 12 hr tablet Take 600 mg by mouth 2 times daily       " levofloxacin (LEVAQUIN) 500 MG tablet Take 500 mg by mouth daily       melatonin 3 MG tablet Take 1 tablet (3 mg) by mouth At Bedtime 30 tablet 0     metoprolol tartrate (LOPRESSOR) 25 MG tablet Take 25 mg by mouth 2 times daily Hold HR <60 bpm, hold < 100/60       QUEtiapine (SEROQUEL) 25 MG tablet Take 12.5 mg by mouth At Bedtime        acetaminophen (TYLENOL) 325 MG tablet Take 650 mg by mouth every 4 hours as needed for mild pain for 3 days       CALCIUM CARBONATE PO Take by mouth daily       Cholecalciferol (VITAMIN D-3 PO) Take by mouth At Bedtime       COENZYME Q10 PO Take by mouth daily       Digestive Enzymes (PAPAYA ENZYME PO) Take by mouth daily       MAGNESIUM SULFATE PO Take by mouth 2 times daily       metoprolol tartrate (LOPRESSOR) 25 MG tablet Take 0.5 tablets (12.5 mg) by mouth 2 times daily (Patient not taking: Reported on 1/10/2019) 30 tablet 0     Omega-3 Fatty Acids (FISH OIL PO) Take by mouth daily       QUEtiapine (SEROQUEL) 25 MG tablet Take 0.5 tablets (12.5 mg) by mouth 3 times daily as needed (for agitation) (Patient not taking: Reported on 1/10/2019)       QUEtiapine (SEROQUEL) 25 MG tablet Take 0.5 tablets (12.5 mg) by mouth 2 times daily (Patient not taking: Reported on 1/10/2019) 30 tablet 0     QUEtiapine (SEROQUEL) 25 MG tablet Take 1 tablet (25 mg) by mouth At Bedtime (Patient not taking: Reported on 1/14/2019) 30 tablet 0     Saw Palmetto, Serenoa repens, (SAW PALMETTO PO) Take by mouth daily       simethicone (GAS-X) 80 MG chewable tablet Take 80 mg by mouth as needed for flatulence or cramping       VALERIAN ROOT PO Take by mouth At Bedtime       vitamin B complex with vitamin C (VITAMIN  B COMPLEX) tablet Take 1 tablet by mouth daily       VITAMIN E PO Take by mouth daily       ZINC SULFATE PO Take by mouth daily       Medications reconciled to facility chart and changes were made to reflect current medications as identified as above med list. Below are the changes that were  "made:   Medications stopped since last EPIC medication reconciliation:   There are no discontinued medications.    Medications started since last [facility] medication reconciliation:  Orders Placed This Encounter   Medications     levofloxacin (LEVAQUIN) 500 MG tablet     Sig: Take 500 mg by mouth daily     guaiFENesin (MUCINEX) 600 MG 12 hr tablet     Sig: Take 600 mg by mouth 2 times daily     REVIEW OF SYSTEMS:  Unobtainable secondary to cognitive impairment.     Physical Exam:  /75   Pulse 66   Temp 98.1  F (36.7  C)   Resp 18   Ht 1.88 m (6' 2\")   Wt 64.5 kg (142 lb 1.6 oz)   SpO2 97%   BMI 18.24 kg/m     GENERAL APPEARANCE:  Alert, tired appearance, dark circles under eyes.   EYES:  EOM, lids, pupils and irises normal, sclera clear and conjunctiva normal, no discharge or mattering on lids or lashes noted  ENT:  Mouth normal, moist mucous membranes, nose without drainage or crusting, external ears without lesions, hearing acuity intact  RESP:  respiratory effort and palpation of chest normal, no chest wall tenderness, no respiratory distress, lung sounds clear, patient is on room air.   CV:  Palpation and auscultation of heart done, rate and rhythm irregular, no murmur. Edema none in bilateral lower extremities. VASCULAR: warm extremities without open areas.  ABDOMEN:  normal bowel sounds, soft, nontender.  M/S:   Gait and station ambulates independently, no tenderness or swelling of the joints; able to move all extremities   SKIN:  Inspection and palpation of skin and subcutaneous tissue: skin warm, dry and intact without rashes  NEURO: cranial nerves 2-12 grossly intact, no facial asymmetry, no speech deficits and able to follow directions, moves all extremities symmetrically  PSYCH:  insight and judgement intact, memory impaired, affect and mood normal    Recent Labs:   CBC RESULTS:   Recent Labs   Lab Test 01/02/19 12/21/18  1050   WBC 4.9 4.4   RBC 3.81* 4.11*   HGB 12.4* 13.4   HCT 38.4* 40.1 "   * 98   MCH 32.5 32.6   MCHC 32.3 33.4   RDW 13.2 13.4    233       Last Basic Metabolic Panel:  Recent Labs   Lab Test 01/02/19 12/27/18  0632 12/26/18  0828   *  --  140   POTASSIUM 3.9 4.1 3.9   CHLORIDE 101  --  106   LILIANA 9.2  --  9.2   CO2 26  --  28   BUN 28  --  24   CR 1.03  --  1.12   GLC 94  --  94       TSH   Date Value Ref Range Status   12/21/2018 2.78 0.40 - 4.00 mU/L Final         Assessment/Plan:  Pneumonia of LLL  Opacity in left lobe of lung and started on levaquin last week. Symptoms have improved. SLP evaluated and he was downgraded to nectar thickened liquids.   --continue with levaquin 500 mg daily--last dose on 1/15/19    Paroxysmal Atrial Fibrillation  Tachy Drew Syndrome  Presented in RVR. New on metoprolol and diltiazem. Family wants more aggressive treatment for atrial fibrillation and he was seen by cardiology last week No changes were made to medications but a ziopatch was placed for three days.   -- continues on diltiazem 120 mg daily and metoprolol 25 mg BID  -- anticoagulated with apixaban 5 mg BID  -- follow up with cardiology (Dr. Che) in 10-14 days     Dementia Without Behavioral Disturbance  Acute Delirium  Reported progressive memory loss since March 2018. Psychiatry consulted and clinical picture consistent with dementia with delirium. Seroquel has been successfully tapered off during the day and now with morning sleepiness so will discontinue morning dose.   - discontinue seroqeul.   --continue with 24/7 nursing on secure unit. OT to administer cognitive testing.      Electronically signed by  PAUL Evans CNP              Sincerely,        PAUL Roy CNP

## 2019-01-14 NOTE — PROGRESS NOTES
"[facility]    Chief Complaint   Patient presents with     DEVAUGHN       [place] Medical Record Number:  [id number]  Place of Service where encounter took place:  [facility] ([facility]) [[id number]]    HPI:    Paul Milligan is a 83 year old  (1935), who is being seen today for an episodic care visit.  HPI information obtained from: facility chart records, facility staff and patient report.    Pneumonia of left lung due to infectious organism, unspecified part of lung  Chest xray obtained last week and resulted with \"patchy opacity at the left lung base\". Started on levaquin. He has been afebrile and oxygen saturations have been 94-97% on room air over the weekend.    Paroxysmal atrial fibrillation (H)  Tachy-hugh syndrome (H)  Followed up with cardiology last week and a ziopatch was place.   BP Readin/75  123/84  104/70  HR:   over the weekend.   Wt Readings from Last 4 Encounters:   19 64.5 kg (142 lb 1.6 oz)   01/10/19 64.8 kg (142 lb 12.8 oz)   01/10/19 66 kg (145 lb 6.4 oz)   18 65.3 kg (143 lb 15.4 oz)     Dementia without behavioral disturbance, unspecified dementia type  Daytime Seroqeul was weaned off last week and HS seroquel was decreased to 12.5 mg. Has been sleeping well at night. Therapy reports today that he is nodding off during therapy.    ALLERGIES: No known allergies  Past Medical, Surgical, Family and Social History reviewed and updated in [facility].    Current Outpatient Medications   Medication Sig Dispense Refill     apixaban ANTICOAGULANT (ELIQUIS) 5 MG tablet Take 1 tablet (5 mg) by mouth 2 times daily 60 tablet 0     Ascorbic Acid (VITAMIN C PO) Take 500 mg by mouth daily        diltiazem ER (DILT-XR) 120 MG 24 hr capsule Take 1 capsule (120 mg) by mouth daily 30 capsule 0     guaiFENesin (MUCINEX) 600 MG 12 hr tablet Take 600 mg by mouth 2 times daily       levofloxacin (LEVAQUIN) 500 MG tablet Take 500 mg by mouth daily       melatonin 3 " MG tablet Take 1 tablet (3 mg) by mouth At Bedtime 30 tablet 0     metoprolol tartrate (LOPRESSOR) 25 MG tablet Take 25 mg by mouth 2 times daily Hold HR <60 bpm, hold < 100/60       QUEtiapine (SEROQUEL) 25 MG tablet Take 12.5 mg by mouth At Bedtime        acetaminophen (TYLENOL) 325 MG tablet Take 650 mg by mouth every 4 hours as needed for mild pain for 3 days       CALCIUM CARBONATE PO Take by mouth daily       Cholecalciferol (VITAMIN D-3 PO) Take by mouth At Bedtime       COENZYME Q10 PO Take by mouth daily       Digestive Enzymes (PAPAYA ENZYME PO) Take by mouth daily       MAGNESIUM SULFATE PO Take by mouth 2 times daily       metoprolol tartrate (LOPRESSOR) 25 MG tablet Take 0.5 tablets (12.5 mg) by mouth 2 times daily (Patient not taking: Reported on 1/10/2019) 30 tablet 0     Omega-3 Fatty Acids (FISH OIL PO) Take by mouth daily       QUEtiapine (SEROQUEL) 25 MG tablet Take 0.5 tablets (12.5 mg) by mouth 3 times daily as needed (for agitation) (Patient not taking: Reported on 1/10/2019)       QUEtiapine (SEROQUEL) 25 MG tablet Take 0.5 tablets (12.5 mg) by mouth 2 times daily (Patient not taking: Reported on 1/10/2019) 30 tablet 0     QUEtiapine (SEROQUEL) 25 MG tablet Take 1 tablet (25 mg) by mouth At Bedtime (Patient not taking: Reported on 1/14/2019) 30 tablet 0     Saw Palmetto, Serenoa repens, (SAW PALMETTO PO) Take by mouth daily       simethicone (GAS-X) 80 MG chewable tablet Take 80 mg by mouth as needed for flatulence or cramping       VALERIAN ROOT PO Take by mouth At Bedtime       vitamin B complex with vitamin C (VITAMIN  B COMPLEX) tablet Take 1 tablet by mouth daily       VITAMIN E PO Take by mouth daily       ZINC SULFATE PO Take by mouth daily       Medications reconciled to facility chart and changes were made to reflect current medications as identified as above med list. Below are the changes that were made:   Medications stopped since last EPIC medication reconciliation:   There are no  "discontinued medications.    Medications started since last [facility] medication reconciliation:  Orders Placed This Encounter   Medications     levofloxacin (LEVAQUIN) 500 MG tablet     Sig: Take 500 mg by mouth daily     guaiFENesin (MUCINEX) 600 MG 12 hr tablet     Sig: Take 600 mg by mouth 2 times daily     REVIEW OF SYSTEMS:  Unobtainable secondary to cognitive impairment.     Physical Exam:  /75   Pulse 66   Temp 98.1  F (36.7  C)   Resp 18   Ht 1.88 m (6' 2\")   Wt 64.5 kg (142 lb 1.6 oz)   SpO2 97%   BMI 18.24 kg/m    GENERAL APPEARANCE:  Alert, tired appearance, dark circles under eyes.   EYES:  EOM, lids, pupils and irises normal, sclera clear and conjunctiva normal, no discharge or mattering on lids or lashes noted  ENT:  Mouth normal, moist mucous membranes, nose without drainage or crusting, external ears without lesions, hearing acuity intact  RESP:  respiratory effort and palpation of chest normal, no chest wall tenderness, no respiratory distress, lung sounds clear, patient is on room air.   CV:  Palpation and auscultation of heart done, rate and rhythm irregular, no murmur. Edema none in bilateral lower extremities. VASCULAR: warm extremities without open areas.  ABDOMEN:  normal bowel sounds, soft, nontender.  M/S:   Gait and station ambulates independently, no tenderness or swelling of the joints; able to move all extremities   SKIN:  Inspection and palpation of skin and subcutaneous tissue: skin warm, dry and intact without rashes  NEURO: cranial nerves 2-12 grossly intact, no facial asymmetry, no speech deficits and able to follow directions, moves all extremities symmetrically  PSYCH:  insight and judgement intact, memory impaired, affect and mood normal    Recent Labs:   CBC RESULTS:   Recent Labs   Lab Test 01/02/19 12/21/18  1050   WBC 4.9 4.4   RBC 3.81* 4.11*   HGB 12.4* 13.4   HCT 38.4* 40.1   * 98   MCH 32.5 32.6   MCHC 32.3 33.4   RDW 13.2 13.4    233       Last " Basic Metabolic Panel:  Recent Labs   Lab Test 01/02/19 12/27/18  0632 12/26/18  0828   *  --  140   POTASSIUM 3.9 4.1 3.9   CHLORIDE 101  --  106   LILIANA 9.2  --  9.2   CO2 26  --  28   BUN 28  --  24   CR 1.03  --  1.12   GLC 94  --  94       TSH   Date Value Ref Range Status   12/21/2018 2.78 0.40 - 4.00 mU/L Final         Assessment/Plan:  Pneumonia of LLL  Opacity in left lobe of lung and started on levaquin last week. Symptoms have improved. SLP evaluated and he was downgraded to nectar thickened liquids.   --continue with levaquin 500 mg daily--last dose on 1/15/19    Paroxysmal Atrial Fibrillation  Tachy Drew Syndrome  Presented in RVR. New on metoprolol and diltiazem. Family wants more aggressive treatment for atrial fibrillation and he was seen by cardiology last week No changes were made to medications but a ziopatch was placed for three days.   -- continues on diltiazem 120 mg daily and metoprolol 25 mg BID  -- anticoagulated with apixaban 5 mg BID  -- follow up with cardiology (Dr. Che) in 10-14 days     Dementia Without Behavioral Disturbance  Acute Delirium  Reported progressive memory loss since March 2018. Psychiatry consulted and clinical picture consistent with dementia with delirium. Seroquel has been successfully tapered off during the day and now with morning sleepiness so will discontinue morning dose.   - discontinue seroqeul.   --continue with 24/7 nursing on secure unit. OT to administer cognitive testing.      Electronically signed by  PAUL Evans CNP

## 2019-01-21 ENCOUNTER — TELEPHONE (OUTPATIENT)
Dept: CARDIOLOGY | Facility: CLINIC | Age: 84
End: 2019-01-21

## 2019-01-21 NOTE — TELEPHONE ENCOUNTER
Kareen Nurse Manager from [facility] calling to find out if pt needs to have f/u with Dr Che this week.  Pt is scheduled for 2/7 and did wear his ZP for 3 days, but only has 1 day and 10 hours of results. Will discuss with Alicia as Kareen states that pt will be discharged soon from [facility], but they are still looking for placement and wanted to know if this needed to be done while pt is still at [facility]. JNelsonRN

## 2019-01-23 ENCOUNTER — TELEPHONE (OUTPATIENT)
Dept: CARDIOLOGY | Facility: CLINIC | Age: 84
End: 2019-01-23

## 2019-01-23 DIAGNOSIS — I48.91 ATRIAL FIBRILLATION WITH RVR (H): Primary | ICD-10-CM

## 2019-01-23 NOTE — TELEPHONE ENCOUNTER
Pt Daughter Velma called and states that pt is getting out of TCU next Wednesday.  Discussed that ZP that was worn and that it had limited information as it had a lot of artifact.  Velma states that if mostly was not working when she saw it.  At this time pt is scheduled to see Dr Che on 2/7. If another ZP is placed could this be put on 1/31 as pt is heading home and then OV with Dr Che can be pushed out ot 2/13 to review.  Will discuss with Alicia and see what she thinks. [place]

## 2019-01-23 NOTE — TELEPHONE ENCOUNTER
Dr. Che    Please advise.   Are okay with me setting up a AV node ablation and ppm.    Paul Milligan is a 83 year old male who was seen in the hospital for atrial fibrillation with RVR. His meds were changed and he ultimately discharged on diltiazem 120 mg daily and metoprolol tartrate 12.5 mg daily.   He wasn't treated aggressively partly due to his dementia.  Now his family would like interventions done if they improve his quality of life.       He has been in a nursing home and his heart rates have variable between 50's-130's.  That was also the case in clinic.  EKG showed  and palpation was 64 bpm.      I placed a zio patch which he work for 1 day and 10 hours.  It showed 100% afib with average HR 118bpm varies between .  He is currently on diltiazem 120 mg daily and metoprolol 25 mg BID His blood pressure does not allow further rate control medications other than digoxin      I have him following up with you but the appointment isn't until 2/7.        He will not be able to consent.  His daughter will need to do that for him.      Thank you,    Alicia

## 2019-01-24 ENCOUNTER — NURSING HOME VISIT (OUTPATIENT)
Dept: GERIATRICS | Facility: CLINIC | Age: 84
End: 2019-01-24
Payer: COMMERCIAL

## 2019-01-24 VITALS
BODY MASS INDEX: 17.99 KG/M2 | WEIGHT: 140.2 LBS | DIASTOLIC BLOOD PRESSURE: 67 MMHG | HEART RATE: 84 BPM | OXYGEN SATURATION: 100 % | RESPIRATION RATE: 16 BRPM | SYSTOLIC BLOOD PRESSURE: 102 MMHG | TEMPERATURE: 98.1 F | HEIGHT: 74 IN

## 2019-01-24 DIAGNOSIS — R63.4 WEIGHT LOSS: Primary | ICD-10-CM

## 2019-01-24 DIAGNOSIS — I48.19 PERSISTENT ATRIAL FIBRILLATION (H): ICD-10-CM

## 2019-01-24 DIAGNOSIS — I49.5 TACHY-BRADY SYNDROME (H): ICD-10-CM

## 2019-01-24 DIAGNOSIS — R53.81 PHYSICAL DECONDITIONING: ICD-10-CM

## 2019-01-24 DIAGNOSIS — F03.90 DEMENTIA WITHOUT BEHAVIORAL DISTURBANCE, UNSPECIFIED DEMENTIA TYPE: ICD-10-CM

## 2019-01-24 PROCEDURE — 99309 SBSQ NF CARE MODERATE MDM 30: CPT | Performed by: NURSE PRACTITIONER

## 2019-01-24 ASSESSMENT — MIFFLIN-ST. JEOR: SCORE: 1410.69

## 2019-01-24 NOTE — TELEPHONE ENCOUNTER
Message left for daughter (Velma) to discuss recommendations per Dr Che.  Awaiting return call.  VEDA Gallo

## 2019-01-24 NOTE — PROGRESS NOTES
"[facility]    Chief Complaint   Patient presents with     DEVAUGHN       [facility] Medical Record Number:  [id number]  Place of Service where encounter took place:  [facility] ([facility]) [[id number]]    HPI:    Paul Milligan is a 83 year old  (1935), who is being seen today for an episodic care visit.  HPI information obtained from: facility chart records, facility staff and patient report.    Weight loss  He has had a 6 pound cumulative weight loss since admission. Admission weight of 142 lb which decreased to 135.7 and is now back to 139 lb today. he did have a poor appetite when he had pneumonia.      Pneumonia of left lung due to infectious organism, unspecified part of lung--resolved  Chest xray obtained upon admission to the TCU and resulted with \"patchy opacity at the left lung base\".  levaquin. He has been afebrile and oxygen saturations have been 94-97% on room air. He was evaluated by SLP who recommended regular diet with nectar thickened liquids.     Paroxysmal atrial fibrillation (H)  Tachy-hugh syndrome (H)  Followed up with cardiology on 1/10 and a ziopatch was placed. Per chart review it appears that 1 day and 10 hours of data was captured and it showed 100% afib with average HR 118bpm varies between .   BP Readin/67  127/73  127/70  HR:       Dementia without behavioral disturbance, unspecified dementia type  All seroquel has been discontinued and he has been sleeping well and not agitation noted by staff or family.     Physical deconditioning  Currently a stand by assist with bed mobility, transfers and ambulation. Last day of therapy will be on  and he will go home on  with 24 hour assistance from his daughter.     ALLERGIES: No known allergies  Past Medical, Surgical, Family and Social History reviewed and updated in [facility] [employer].    Current Outpatient Medications   Medication Sig Dispense Refill     apixaban ANTICOAGULANT (ELIQUIS) 5 MG tablet " Take 1 tablet (5 mg) by mouth 2 times daily 60 tablet 0     Ascorbic Acid (VITAMIN C PO) Take 500 mg by mouth daily        diltiazem ER (DILT-XR) 120 MG 24 hr capsule Take 1 capsule (120 mg) by mouth daily 30 capsule 0     melatonin 3 MG tablet Take 1 tablet (3 mg) by mouth At Bedtime 30 tablet 0     metoprolol tartrate (LOPRESSOR) 25 MG tablet Take 25 mg by mouth 2 times daily Hold HR <60 bpm, hold < 100/60       acetaminophen (TYLENOL) 325 MG tablet Take 650 mg by mouth every 4 hours as needed for mild pain for 3 days       CALCIUM CARBONATE PO Take by mouth daily       Cholecalciferol (VITAMIN D-3 PO) Take by mouth At Bedtime       COENZYME Q10 PO Take by mouth daily       Digestive Enzymes (PAPAYA ENZYME PO) Take by mouth daily       guaiFENesin (MUCINEX) 600 MG 12 hr tablet Take 600 mg by mouth 2 times daily       levofloxacin (LEVAQUIN) 500 MG tablet Take 500 mg by mouth daily       MAGNESIUM SULFATE PO Take by mouth 2 times daily       metoprolol tartrate (LOPRESSOR) 25 MG tablet Take 0.5 tablets (12.5 mg) by mouth 2 times daily (Patient not taking: Reported on 1/10/2019) 30 tablet 0     Omega-3 Fatty Acids (FISH OIL PO) Take by mouth daily       QUEtiapine (SEROQUEL) 25 MG tablet Take 12.5 mg by mouth At Bedtime        QUEtiapine (SEROQUEL) 25 MG tablet Take 0.5 tablets (12.5 mg) by mouth 3 times daily as needed (for agitation) (Patient not taking: Reported on 1/10/2019)       QUEtiapine (SEROQUEL) 25 MG tablet Take 0.5 tablets (12.5 mg) by mouth 2 times daily (Patient not taking: Reported on 1/10/2019) 30 tablet 0     QUEtiapine (SEROQUEL) 25 MG tablet Take 1 tablet (25 mg) by mouth At Bedtime (Patient not taking: Reported on 1/14/2019) 30 tablet 0     Saw Palmetto, Serenoa repens, (SAW PALMETTO PO) Take by mouth daily       simethicone (GAS-X) 80 MG chewable tablet Take 80 mg by mouth as needed for flatulence or cramping       VALERIAN ROOT PO Take by mouth At Bedtime       vitamin B complex with vitamin C  "(VITAMIN  B COMPLEX) tablet Take 1 tablet by mouth daily       VITAMIN E PO Take by mouth daily       ZINC SULFATE PO Take by mouth daily       Medications reconciled to facility chart and changes were made to reflect current medications as identified as above med list. Below are the changes that were made:   Medications stopped since last EPIC medication reconciliation:   There are no discontinued medications.    Medications started since last [facility] medication reconciliation:  No orders of the defined types were placed in this encounter.    REVIEW OF SYSTEMS:  Unobtainable secondary to cognitive impairment.     Physical Exam:  /67   Pulse 84   Temp 98.1  F (36.7  C)   Resp 16   Ht 1.88 m (6' 2\")   Wt 63.6 kg (140 lb 3.2 oz)   SpO2 100%   BMI 18.00 kg/m    GENERAL APPEARANCE:  Alert, cooperative. Appears tired.   EYES:  EOM, lids, pupils and irises normal, sclera clear and conjunctiva normal, no discharge or mattering on lids or lashes noted  ENT:  Mouth normal, moist mucous membranes, nose without drainage or crusting, external ears without lesions, hearing acuity intact  RESP:  respiratory effort and palpation of chest normal, no chest wall tenderness, no respiratory distress, lung sounds clear, patient is on room air.   CV:  Palpation and auscultation of heart done, rate and rhythm irregular, no murmur. Edema none in bilateral lower extremities. VASCULAR: warm extremities without open areas.  ABDOMEN:  normal bowel sounds, soft, nontender.  M/S:   Gait and station ambulates independently, no tenderness or swelling of the joints; able to move all extremities   SKIN:  Inspection and palpation of skin and subcutaneous tissue: skin warm, dry and intact without rashes  NEURO: cranial nerves 2-12 grossly intact, no facial asymmetry, no speech deficits and able to follow directions, moves all extremities symmetrically  PSYCH:  insight and judgement intact, memory impaired, affect and mood flat    Recent " Labs:   CBC RESULTS:   Recent Labs   Lab Test 01/02/19 12/21/18  1050   WBC 4.9 4.4   RBC 3.81* 4.11*   HGB 12.4* 13.4   HCT 38.4* 40.1   * 98   MCH 32.5 32.6   MCHC 32.3 33.4   RDW 13.2 13.4    233       Last Basic Metabolic Panel:  Recent Labs   Lab Test 01/02/19 12/27/18  0632 12/26/18  0828   *  --  140   POTASSIUM 3.9 4.1 3.9   CHLORIDE 101  --  106   LILIANA 9.2  --  9.2   CO2 26  --  28   BUN 28  --  24   CR 1.03  --  1.12   GLC 94  --  94       TSH   Date Value Ref Range Status   12/21/2018 2.78 0.40 - 4.00 mU/L Final     Assessment/Plan:  Weight loss  Comment: overall down 6 lbs from admission secondary to pneumonia. He has been receiving house nutritional supplements and appetite has been increasing since pneumonia.  --continue with house nutritional supplements and monitor weights closely.     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 New on metoprolol and diltiazem. Family wants more aggressive treatment for atrial fibrillation and he was seen by cardiology with ziopatch placement which showed 100% atrial fibrillation with variable rates. Per chart review, PPM and AV ablation to be scheduled.   -- continues on diltiazem 120 mg daily and metoprolol 25 mg BID  -- anticoagulated with apixaban 5 mg BID  -- follow up with cardiology (Dr. Che) for PPM and AV ablation.     Dementia Without Behavioral Disturbance  Reported progressive memory loss since March 2018. Psychiatry consulted and clinical picture consistent with dementia with delirium. Seroquel has been successfully tapered off. CPT 2.0/5.6  --continue with 24/7 nursing on secure unit.     Pneumonia of LLL-resolved  Opacity in left lobe of lung and completed levaquin. Symptoms have improved. SLP evaluated and he was downgraded to nectar thickened liquids.   --monitor for further s/s of aspiration    Physical deconditioning  secondary to recent hospitalization and underlying medical conditions.   --PT/OT for  strengthening. IRMA for discharge planning.       Electronically signed by  PAUL Evans CNP

## 2019-01-24 NOTE — TELEPHONE ENCOUNTER
If family agreeable to AVN ablation and ppm set it up and I or other will see him in the caresuites. Thanks, qp

## 2019-01-24 NOTE — LETTER
"    2019        RE: Paul Milligan  [address]  [facility] [address]        [facility]    Chief Complaint   Patient presents with     MADDIEECK       [facility] Medical Record Number:  [id number]  Place of Service where encounter took place:  [facility] ([facility]) [[id number]]    HPI:    Paul Milligan is a 83 year old  (1935), who is being seen today for an episodic care visit.  HPI information obtained from: facility chart records, facility staff and patient report.    Weight loss  He has had a 6 pound cumulative weight loss since admission. Admission weight of 142 lb which decreased to 135.7 and is now back to 139 lb today. he did have a poor appetite when he had pneumonia.      Pneumonia of left lung due to infectious organism, unspecified part of lung--resolved  Chest xray obtained upon admission to the TCU and resulted with \"patchy opacity at the left lung base\".  levaquin. He has been afebrile and oxygen saturations have been 94-97% on room air. He was evaluated by SLP who recommended regular diet with nectar thickened liquids.     Paroxysmal atrial fibrillation (H)  Tachy-hugh syndrome (H)  Followed up with cardiology on 1/10 and a ziopatch was placed. Per chart review it appears that 1 day and 10 hours of data was captured and it showed 100% afib with average HR 118bpm varies between .   BP Readin/67  127/73  127/70  HR:       Dementia without behavioral disturbance, unspecified dementia type  All seroquel has been discontinued and he has been sleeping well and not agitation noted by staff or family.     Physical deconditioning  Currently a stand by assist with bed mobility, transfers and ambulation. Last day of therapy will be on  and he will go home on  with 24 hour assistance from his daughter.     ALLERGIES: No known allergies  Past Medical, Surgical, Family and Social History reviewed and updated in [facility].    Current Outpatient Medications "   Medication Sig Dispense Refill     apixaban ANTICOAGULANT (ELIQUIS) 5 MG tablet Take 1 tablet (5 mg) by mouth 2 times daily 60 tablet 0     Ascorbic Acid (VITAMIN C PO) Take 500 mg by mouth daily        diltiazem ER (DILT-XR) 120 MG 24 hr capsule Take 1 capsule (120 mg) by mouth daily 30 capsule 0     melatonin 3 MG tablet Take 1 tablet (3 mg) by mouth At Bedtime 30 tablet 0     metoprolol tartrate (LOPRESSOR) 25 MG tablet Take 25 mg by mouth 2 times daily Hold HR <60 bpm, hold < 100/60       acetaminophen (TYLENOL) 325 MG tablet Take 650 mg by mouth every 4 hours as needed for mild pain for 3 days       CALCIUM CARBONATE PO Take by mouth daily       Cholecalciferol (VITAMIN D-3 PO) Take by mouth At Bedtime       COENZYME Q10 PO Take by mouth daily       Digestive Enzymes (PAPAYA ENZYME PO) Take by mouth daily       guaiFENesin (MUCINEX) 600 MG 12 hr tablet Take 600 mg by mouth 2 times daily       levofloxacin (LEVAQUIN) 500 MG tablet Take 500 mg by mouth daily       MAGNESIUM SULFATE PO Take by mouth 2 times daily       metoprolol tartrate (LOPRESSOR) 25 MG tablet Take 0.5 tablets (12.5 mg) by mouth 2 times daily (Patient not taking: Reported on 1/10/2019) 30 tablet 0     Omega-3 Fatty Acids (FISH OIL PO) Take by mouth daily       QUEtiapine (SEROQUEL) 25 MG tablet Take 12.5 mg by mouth At Bedtime        QUEtiapine (SEROQUEL) 25 MG tablet Take 0.5 tablets (12.5 mg) by mouth 3 times daily as needed (for agitation) (Patient not taking: Reported on 1/10/2019)       QUEtiapine (SEROQUEL) 25 MG tablet Take 0.5 tablets (12.5 mg) by mouth 2 times daily (Patient not taking: Reported on 1/10/2019) 30 tablet 0     QUEtiapine (SEROQUEL) 25 MG tablet Take 1 tablet (25 mg) by mouth At Bedtime (Patient not taking: Reported on 1/14/2019) 30 tablet 0     Saw Palmetto, Serenoa repens, (SAW PALMETTO PO) Take by mouth daily       simethicone (GAS-X) 80 MG chewable tablet Take 80 mg by mouth as needed for flatulence or cramping    "    VALERIAN ROOT PO Take by mouth At Bedtime       vitamin B complex with vitamin C (VITAMIN  B COMPLEX) tablet Take 1 tablet by mouth daily       VITAMIN E PO Take by mouth daily       ZINC SULFATE PO Take by mouth daily       Medications reconciled to facility chart and changes were made to reflect current medications as identified as above med list. Below are the changes that were made:   Medications stopped since last EPIC medication reconciliation:   There are no discontinued medications.    Medications started since last [facility] medication reconciliation:  No orders of the defined types were placed in this encounter.    REVIEW OF SYSTEMS:  Unobtainable secondary to cognitive impairment.     Physical Exam:  /67   Pulse 84   Temp 98.1  F (36.7  C)   Resp 16   Ht 1.88 m (6' 2\")   Wt 63.6 kg (140 lb 3.2 oz)   SpO2 100%   BMI 18.00 kg/m     GENERAL APPEARANCE:  Alert, cooperative. Appears tired.   EYES:  EOM, lids, pupils and irises normal, sclera clear and conjunctiva normal, no discharge or mattering on lids or lashes noted  ENT:  Mouth normal, moist mucous membranes, nose without drainage or crusting, external ears without lesions, hearing acuity intact  RESP:  respiratory effort and palpation of chest normal, no chest wall tenderness, no respiratory distress, lung sounds clear, patient is on room air.   CV:  Palpation and auscultation of heart done, rate and rhythm irregular, no murmur. Edema none in bilateral lower extremities. VASCULAR: warm extremities without open areas.  ABDOMEN:  normal bowel sounds, soft, nontender.  M/S:   Gait and station ambulates independently, no tenderness or swelling of the joints; able to move all extremities   SKIN:  Inspection and palpation of skin and subcutaneous tissue: skin warm, dry and intact without rashes  NEURO: cranial nerves 2-12 grossly intact, no facial asymmetry, no speech deficits and able to follow directions, moves all extremities " symmetrically  PSYCH:  insight and judgement intact, memory impaired, affect and mood flat    Recent Labs:   CBC RESULTS:   Recent Labs   Lab Test 01/02/19 12/21/18  1050   WBC 4.9 4.4   RBC 3.81* 4.11*   HGB 12.4* 13.4   HCT 38.4* 40.1   * 98   MCH 32.5 32.6   MCHC 32.3 33.4   RDW 13.2 13.4    233       Last Basic Metabolic Panel:  Recent Labs   Lab Test 01/02/19 12/27/18  0632 12/26/18  0828   *  --  140   POTASSIUM 3.9 4.1 3.9   CHLORIDE 101  --  106   LILIANA 9.2  --  9.2   CO2 26  --  28   BUN 28  --  24   CR 1.03  --  1.12   GLC 94  --  94       TSH   Date Value Ref Range Status   12/21/2018 2.78 0.40 - 4.00 mU/L Final     Assessment/Plan:  Weight loss  Comment: overall down 6 lbs from admission secondary to pneumonia. He has been receiving house nutritional supplements and appetite has been increasing since pneumonia.  --continue with house nutritional supplements and monitor weights closely.     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 New on metoprolol and diltiazem. Family wants more aggressive treatment for atrial fibrillation and he was seen by cardiology with ziopatch placement which showed 100% atrial fibrillation with variable rates. Per chart review, PPM and AV ablation to be scheduled.   -- continues on diltiazem 120 mg daily and metoprolol 25 mg BID  -- anticoagulated with apixaban 5 mg BID  -- follow up with cardiology (Dr. Che) for PPM and AV ablation.     Dementia Without Behavioral Disturbance  Reported progressive memory loss since March 2018. Psychiatry consulted and clinical picture consistent with dementia with delirium. Seroquel has been successfully tapered off. CPT 2.0/5.6  --continue with 24/7 nursing on secure unit.     Pneumonia of LLL-resolved  Opacity in left lobe of lung and completed levaquin. Symptoms have improved. SLP evaluated and he was downgraded to nectar thickened liquids.   --monitor for further s/s of aspiration    Physical  deconditioning  secondary to recent hospitalization and underlying medical conditions.   --PT/OT for strengthening. SW for discharge planning.       Electronically signed by  PAUL Evans CNP            Sincerely,        PAUL Roy CNP

## 2019-01-25 ENCOUNTER — HOSPITAL ENCOUNTER (OUTPATIENT)
Facility: CLINIC | Age: 84
End: 2019-01-25
Payer: COMMERCIAL

## 2019-01-25 DIAGNOSIS — I48.91 ATRIAL FIBRILLATION WITH RVR (H): ICD-10-CM

## 2019-01-25 NOTE — TELEPHONE ENCOUNTER
Spoke with daughter Velma on the phone regarding Dr. Che's recommendation of AV node ablation and permanent pacemaker due to the zio patch results of average  bpm and limited ability to increase AV node blockers due to hypotension.     We discussed the risk of serious complication is 1% to 2%.  Potential complications include, but are not limited to bleeding, vascular injury requiring surgical repair, TIA and other perceived complications    Procedural risks of pacemaker implantation were discussed in detail and include such concerns as:  Internal bleeding, collapsed lung, localized bleeding and bruising, infection (immediate and long-term) and need to re-position pacemaker lead(s). We also reviewed use of moderate sedation and local anesthetic. Velma the patient's daughter voices understanding of the reasons for recommending pacemaker placement and denies any questions at this time. Consent will be gotten by the procedural physician.      Of note.  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  His cognitive status is improving and is expected to be discharged from TCU on Wednesday January 30.      I will ask scheduling to call Velma to schedule.

## 2019-01-27 ASSESSMENT — MIFFLIN-ST. JEOR: SCORE: 1405.7

## 2019-01-28 ENCOUNTER — RECORDS - HEALTHEAST (OUTPATIENT)
Dept: LAB | Facility: CLINIC | Age: 84
End: 2019-01-28

## 2019-01-28 ENCOUNTER — DISCHARGE SUMMARY NURSING HOME (OUTPATIENT)
Dept: GERIATRICS | Facility: CLINIC | Age: 84
End: 2019-01-28
Payer: COMMERCIAL

## 2019-01-28 VITALS
RESPIRATION RATE: 17 BRPM | SYSTOLIC BLOOD PRESSURE: 133 MMHG | DIASTOLIC BLOOD PRESSURE: 82 MMHG | OXYGEN SATURATION: 98 % | WEIGHT: 139.1 LBS | HEIGHT: 74 IN | HEART RATE: 88 BPM | BODY MASS INDEX: 17.85 KG/M2 | TEMPERATURE: 98.1 F

## 2019-01-28 DIAGNOSIS — J18.9 PNEUMONIA OF LEFT LOWER LOBE DUE TO INFECTIOUS ORGANISM: ICD-10-CM

## 2019-01-28 DIAGNOSIS — R53.81 PHYSICAL DECONDITIONING: ICD-10-CM

## 2019-01-28 DIAGNOSIS — F03.90 DEMENTIA WITHOUT BEHAVIORAL DISTURBANCE, UNSPECIFIED DEMENTIA TYPE: ICD-10-CM

## 2019-01-28 DIAGNOSIS — R63.4 WEIGHT LOSS: ICD-10-CM

## 2019-01-28 DIAGNOSIS — I48.19 PERSISTENT ATRIAL FIBRILLATION (H): Primary | ICD-10-CM

## 2019-01-28 DIAGNOSIS — G47.00 INSOMNIA, UNSPECIFIED TYPE: ICD-10-CM

## 2019-01-28 PROCEDURE — 99316 NF DSCHRG MGMT 30 MIN+: CPT | Performed by: NURSE PRACTITIONER

## 2019-01-28 NOTE — LETTER
1/28/2019        RE: Paul Milligan  [address]  [place] [address]          [facility] DISCHARGE SUMMARY    PATIENT'S NAME: Paul Milligan  YOB: 1935  MEDICAL RECORD NUMBER:  [id number]  Place of Service where encounter took place:  [facility] ([facility]) [[id number]]    PRIMARY CARE PROVIDER AND CLINIC RESPONSIBLE AFTER TRANSFER: Ismael العلي [address] / [place] [address]     TRANSFERRING PROVIDERS: PAUL Steel CNP, Rupinder Biggs MD  DATE OF SNF ADMISSION:  December / 28 / 2018  DATE OF SNF (anticipated) DISCHARGE: January / 29 / 2019  DISCHARGE DISPOSITION: [facility] Provider   RECENT HOSPITALIZATION/ED:  Hospital  [facility] stay 12/21/2018 to 12/28/2018.     CODE STATUS/ADVANCE DIRECTIVES DISCUSSION:   CPR/Full code      Allergies   Allergen Reactions     No Known Allergies      Condition on Discharge:  Stable.  Function:  Ambulates independently with stand by assistance.   Cognitive Scores: CPT 2.0/5.6 and MMSE 9/30  Equipment: no aids    DISCHARGE DIAGNOSIS:   1. Persistent atrial fibrillation (H)    2. Dementia without behavioral disturbance, unspecified dementia type    3. Weight loss    4. Pneumonia of left lower lobe due to infectious organism (H)    5. Insomnia, unspecified type    6. Physical deconditioning         HPI Nursing Facility Course:  Paul Milligan is a 83 year old  (1935) male who was seen at [facility] TCU on January 3, 2019 for an initial visit. Medical history is notable for atrial fibrillation and cognitive impairment. He was hospitalized at [facility] from 12/21/18 to 12/28/18 where he presented with increasing confusion. He was found to be in a-fib with RVR and is new on metoprolol and diltiazem. 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 Had some sinus pauses and metoprolol had to be reduced. He is new on apixaban. TTE with EF 45%.  Psychiatry consulted re: progressive memory loss. Per their notes, dementia with acute delirium and he is new on quetiapine and melatonin. He was admitted to this facility for medical management and rehab.     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 New on metoprolol and diltiazem. Family wants more aggressive treatment for atrial fibrillation and he was seen by cardiology with ziopatch placement which showed 100% atrial fibrillation with variable rates. Per chart review, PPM and AV ablation has been scheduled.    -- continues on diltiazem 120 mg daily and metoprolol 25 mg BID  -- anticoagulated with apixaban 5 mg BID  -- follow up with cardiology (Dr. Che) for PPM and AV ablation.  BP Readin/88  119/76  105/82    HR:       Weight loss  overall down 6 lbs from admission secondary to pneumonia. He has been receiving house nutritional supplements and appetite has been increasing since pneumonia.  --continue with boost supplements at home.   Wt Readings from Last 4 Encounters:   19 63.1 kg (139 lb 1.6 oz)   19 63.6 kg (140 lb 3.2 oz)   19 64.5 kg (142 lb 1.6 oz)   01/10/19 64.8 kg (142 lb 12.8 oz)      Dementia Without Behavioral Disturbance  Reported progressive memory loss since 2018. Psychiatry consulted and clinical picture consistent with dementia with delirium. Seroquel has been successfully tapered off. CPT 2.0/5.6. Daughter reports that prior to the most recent hospitalization, he was living independently with spouse, driving a vehicle and walking 2 [place] the [place].   --discharge home with 24 hour supervision with daughter.      Pneumonia of LLL-resolved  Opacity in left lobe of lung and completed levaquin. Symptoms have improved. SLP evaluated and he was downgraded to nectar thickened liquids.   --monitor for further s/s of aspiration     Insomnia  New on melatonin during hospitalization. Has been sleeping well.   -- continues on  "melatonin 3 mg     Physical Deconditioning  In setting of hospitalization and underlying medical conditions  -- ongoing PT/OT at home      PAST MEDICAL HISTORY:  has no past medical history on file.    DISCHARGE MEDICATIONS:  Current Outpatient Medications   Medication Sig Dispense Refill     Ascorbic Acid (VITAMIN C PO) Take 500 mg by mouth daily        diltiazem ER (DILT-XR) 120 MG 24 hr capsule Take 1 capsule (120 mg) by mouth daily 30 capsule 0     metoprolol tartrate (LOPRESSOR) 25 MG tablet Take 25 mg by mouth 2 times daily Hold HR <60 bpm, hold < 100/60       acetaminophen (TYLENOL) 325 MG tablet Take 650 mg by mouth every 4 hours as needed for mild pain for 3 days       CALCIUM CARBONATE PO Take by mouth daily       Cholecalciferol (VITAMIN D-3 PO) Take by mouth At Bedtime       COENZYME Q10 PO Take by mouth daily       Digestive Enzymes (PAPAYA ENZYME PO) Take by mouth daily       guaiFENesin (MUCINEX) 600 MG 12 hr tablet Take 600 mg by mouth 2 times daily       levofloxacin (LEVAQUIN) 500 MG tablet Take 500 mg by mouth daily       MAGNESIUM SULFATE PO Take by mouth 2 times daily       Omega-3 Fatty Acids (FISH OIL PO) Take by mouth daily       Saw Palmetto, Serenoa repens, (SAW PALMETTO PO) Take by mouth daily       simethicone (GAS-X) 80 MG chewable tablet Take 80 mg by mouth as needed for flatulence or cramping       VALERIAN ROOT PO Take by mouth At Bedtime       vitamin B complex with vitamin C (VITAMIN  B COMPLEX) tablet Take 1 tablet by mouth daily       VITAMIN E PO Take by mouth daily       ZINC SULFATE PO Take by mouth daily         Controlled medications sent with patient:   not applicable/none     ROS:    4 point ROS including Respiratory, CV, GI and , other than that noted in the HPI,  is negative    Physical Exam:   Vitals: /82   Pulse 88   Temp 98.1  F (36.7  C)   Resp 17   Ht 1.88 m (6' 2\")   Wt 63.1 kg (139 lb 1.6 oz)   SpO2 98%   BMI 17.86 kg/m     BMI= Body mass index is " 17.86 kg/m .  GENERAL APPEARANCE:  Alert, cooperative. Appears tired.   EYES:  EOM, lids, pupils and irises normal, sclera clear and conjunctiva normal, no discharge or mattering on lids or lashes noted  ENT:  Mouth normal, moist mucous membranes, nose without drainage or crusting, external ears without lesions, hearing acuity intact  RESP:  respiratory effort and palpation of chest normal, no chest wall tenderness, no respiratory distress, lung sounds clear, patient is on room air.   CV:  Palpation and auscultation of heart done, rate and rhythm irregular, no murmur. Edema none in bilateral lower extremities. VASCULAR: warm extremities without open areas.  ABDOMEN:  normal bowel sounds, soft, nontender.  M/S:   Gait and station ambulates independently, no tenderness or swelling of the joints; able to move all extremities   SKIN:  Inspection and palpation of skin and subcutaneous tissue: skin warm, dry and intact without rashes  NEURO: cranial nerves 2-12 grossly intact, no facial asymmetry, no speech deficits and able to follow directions, moves all extremities symmetrically  PSYCH:  insight and judgement intact, memory impaired, affect and mood flat    DISCHARGE PLAN:  Occupational Therapy, Physical Therapy, Speech Therapy , Registered Nurse, Home Health Aide,  and From:  [facility]  Patient instructed to follow-up with:  PCP in 7 days      Current [place] scheduled appointments:  Future Appointments   Date Time Provider Department Center   1/28/2019 12:30 PM Darcy Crook APRN CNP [facility]   2/7/2019  1:15 PM Eder Che MD [facility]       MTM referral needed and placed by this provider: No    Pending labs: NONE  SNF labs   CBC RESULTS:   Recent Labs   Lab Test 01/02/19 12/21/18  1050   WBC 4.9 4.4   RBC 3.81* 4.11*   HGB 12.4* 13.4   HCT 38.4* 40.1   * 98   MCH 32.5 32.6   MCHC 32.3 33.4   RDW 13.2 13.4    233       Last Basic Metabolic  Panel:  Recent Labs   Lab Test 01/02/19 12/27/18  0632 12/26/18  0828   *  --  140   POTASSIUM 3.9 4.1 3.9   CHLORIDE 101  --  106   LILIANA 9.2  --  9.2   CO2 26  --  28   BUN 28  --  24   CR 1.03  --  1.12   GLC 94  --  94       TSH   Date Value Ref Range Status   12/21/2018 2.78 0.40 - 4.00 mU/L Final         Discharge Treatments: NONE    TOTAL DISCHARGE TIME:   Greater than 30 minutes  Electronically signed by:  PAUL Evans CNP      Sincerely,        PAUL Roy CNP

## 2019-01-28 NOTE — PROGRESS NOTES
[facility] DISCHARGE SUMMARY    PATIENT'S NAME: Paul Milligan  YOB: 1935  MEDICAL RECORD NUMBER:  [id number]  Place of Service where encounter took place:  [facility] ([facility]) [[id number]]    PRIMARY CARE PROVIDER AND CLINIC RESPONSIBLE AFTER TRANSFER: Ismael العلي [address] / [place] [address]     TRANSFERRING PROVIDERS: PAUL Steel CNP, Rupinder Biggs MD  DATE OF SNF ADMISSION:  December / 28 / 2018  DATE OF SNF (anticipated) DISCHARGE: January / 29 / 2019  DISCHARGE DISPOSITION: [facility] Provider   RECENT HOSPITALIZATION/ED:  [facility] stay 12/21/2018 to 12/28/2018.     CODE STATUS/ADVANCE DIRECTIVES DISCUSSION:   CPR/Full code      Allergies   Allergen Reactions     No Known Allergies      Condition on Discharge:  Stable.  Function:  Ambulates independently with stand by assistance.   Cognitive Scores: CPT 2.0/5.6 and MMSE 9/30  Equipment: no aids    DISCHARGE DIAGNOSIS:   1. Persistent atrial fibrillation (H)    2. Dementia without behavioral disturbance, unspecified dementia type    3. Weight loss    4. Pneumonia of left lower lobe due to infectious organism (H)    5. Insomnia, unspecified type    6. Physical deconditioning         HPI Nursing Facility Course:  Paul Milligan is a 83 year old  (1935) male who was seen at [facility] TCU on January 3, 2019 for an initial visit. Medical history is notable for atrial fibrillation and cognitive impairment. He was hospitalized at [facility] from 12/21/18 to 12/28/18 where he presented with increasing confusion. He was found to be in a-fib with RVR and is new on metoprolol and diltiazem. 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 Had some sinus pauses and metoprolol had to be reduced. He is new on apixaban. TTE with EF 45%. Psychiatry consulted re: progressive memory loss. Per their notes, dementia with acute  delirium and he is new on quetiapine and melatonin. He was admitted to this facility for medical management and rehab.     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 New on metoprolol and diltiazem. Family wants more aggressive treatment for atrial fibrillation and he was seen by cardiology with ziopatch placement which showed 100% atrial fibrillation with variable rates. Per chart review, PPM and AV ablation has been scheduled.    -- continues on diltiazem 120 mg daily and metoprolol 25 mg BID  -- anticoagulated with apixaban 5 mg BID  -- follow up with cardiology (Dr. Che) for PPM and AV ablation.  BP Readin/88  119/76  105/82    HR:       Weight loss  overall down 6 lbs from admission secondary to pneumonia. He has been receiving house nutritional supplements and appetite has been increasing since pneumonia.  --continue with boost supplements at home.   Wt Readings from Last 4 Encounters:   19 63.1 kg (139 lb 1.6 oz)   19 63.6 kg (140 lb 3.2 oz)   19 64.5 kg (142 lb 1.6 oz)   01/10/19 64.8 kg (142 lb 12.8 oz)      Dementia Without Behavioral Disturbance  Reported progressive memory loss since 2018. Psychiatry consulted and clinical picture consistent with dementia with delirium. Seroquel has been successfully tapered off. CPT 2.0/5.6. Daughter reports that prior to the most recent hospitalization, he was living independently with spouse, driving a vehicle and walking 2 miles [place] the mall.   --discharge home with 24 hour supervision with daughter.      Pneumonia of LLL-resolved  Opacity in left lobe of lung and completed levaquin. Symptoms have improved. SLP evaluated and he was downgraded to nectar thickened liquids.   --monitor for further s/s of aspiration     Insomnia  New on melatonin during hospitalization. Has been sleeping well.   -- continues on melatonin 3 mg     Physical Deconditioning  In setting of hospitalization and  "underlying medical conditions  -- ongoing PT/OT at home      PAST MEDICAL HISTORY:  has no past medical history on file.    DISCHARGE MEDICATIONS:  Current Outpatient Medications   Medication Sig Dispense Refill     Ascorbic Acid (VITAMIN C PO) Take 500 mg by mouth daily        diltiazem ER (DILT-XR) 120 MG 24 hr capsule Take 1 capsule (120 mg) by mouth daily 30 capsule 0     metoprolol tartrate (LOPRESSOR) 25 MG tablet Take 25 mg by mouth 2 times daily Hold HR <60 bpm, hold < 100/60       acetaminophen (TYLENOL) 325 MG tablet Take 650 mg by mouth every 4 hours as needed for mild pain for 3 days       CALCIUM CARBONATE PO Take by mouth daily       Cholecalciferol (VITAMIN D-3 PO) Take by mouth At Bedtime       COENZYME Q10 PO Take by mouth daily       Digestive Enzymes (PAPAYA ENZYME PO) Take by mouth daily       guaiFENesin (MUCINEX) 600 MG 12 hr tablet Take 600 mg by mouth 2 times daily       levofloxacin (LEVAQUIN) 500 MG tablet Take 500 mg by mouth daily       MAGNESIUM SULFATE PO Take by mouth 2 times daily       Omega-3 Fatty Acids (FISH OIL PO) Take by mouth daily       Saw Palmetto, Serenoa repens, (SAW PALMETTO PO) Take by mouth daily       simethicone (GAS-X) 80 MG chewable tablet Take 80 mg by mouth as needed for flatulence or cramping       VALERIAN ROOT PO Take by mouth At Bedtime       vitamin B complex with vitamin C (VITAMIN  B COMPLEX) tablet Take 1 tablet by mouth daily       VITAMIN E PO Take by mouth daily       ZINC SULFATE PO Take by mouth daily         Controlled medications sent with patient:   not applicable/none     ROS:    4 point ROS including Respiratory, CV, GI and , other than that noted in the HPI,  is negative    Physical Exam:   Vitals: /82   Pulse 88   Temp 98.1  F (36.7  C)   Resp 17   Ht 1.88 m (6' 2\")   Wt 63.1 kg (139 lb 1.6 oz)   SpO2 98%   BMI 17.86 kg/m    BMI= Body mass index is 17.86 kg/m .  GENERAL APPEARANCE:  Alert, cooperative. Appears tired.   EYES:  " EOM, lids, pupils and irises normal, sclera clear and conjunctiva normal, no discharge or mattering on lids or lashes noted  ENT:  Mouth normal, moist mucous membranes, nose without drainage or crusting, external ears without lesions, hearing acuity intact  RESP:  respiratory effort and palpation of chest normal, no chest wall tenderness, no respiratory distress, lung sounds clear, patient is on room air.   CV:  Palpation and auscultation of heart done, rate and rhythm irregular, no murmur. Edema none in bilateral lower extremities. VASCULAR: warm extremities without open areas.  ABDOMEN:  normal bowel sounds, soft, nontender.  M/S:   Gait and station ambulates independently, no tenderness or swelling of the joints; able to move all extremities   SKIN:  Inspection and palpation of skin and subcutaneous tissue: skin warm, dry and intact without rashes  NEURO: cranial nerves 2-12 grossly intact, no facial asymmetry, no speech deficits and able to follow directions, moves all extremities symmetrically  PSYCH:  insight and judgement intact, memory impaired, affect and mood flat    DISCHARGE PLAN:  Occupational Therapy, Physical Therapy, Speech Therapy , Registered Nurse, Home Health Aide,  and From:  [facility]  Patient instructed to follow-up with:  PCP in 7 days      Current [place] scheduled appointments:  Future Appointments   Date Time Provider Department Center   1/28/2019 12:30 PM Darcy Crook APRN CNP [facility]   2/7/2019  1:15 PM Eder Che MD [facility] CLIN       MTM referral needed and placed by this provider: No    Pending labs: NONE  SNF labs   CBC RESULTS:   Recent Labs   Lab Test 01/02/19 12/21/18  1050   WBC 4.9 4.4   RBC 3.81* 4.11*   HGB 12.4* 13.4   HCT 38.4* 40.1   * 98   MCH 32.5 32.6   MCHC 32.3 33.4   RDW 13.2 13.4    233       Last Basic Metabolic Panel:  Recent Labs   Lab Test 01/02/19 12/27/18  0632 12/26/18  0828   *  --   140   POTASSIUM 3.9 4.1 3.9   CHLORIDE 101  --  106   LILIANA 9.2  --  9.2   CO2 26  --  28   BUN 28  --  24   CR 1.03  --  1.12   GLC 94  --  94       TSH   Date Value Ref Range Status   12/21/2018 2.78 0.40 - 4.00 mU/L Final         Discharge Treatments: NONE    TOTAL DISCHARGE TIME:   Greater than 30 minutes  Electronically signed by:  PAUL Evans CNP

## 2019-01-29 ENCOUNTER — TELEPHONE (OUTPATIENT)
Dept: CARDIOLOGY | Facility: CLINIC | Age: 84
End: 2019-01-29

## 2019-01-29 LAB
ANION GAP SERPL CALCULATED.3IONS-SCNC: 7 MMOL/L (ref 5–18)
BUN SERPL-MCNC: 26 MG/DL (ref 8–28)
CALCIUM SERPL-MCNC: 8.6 MG/DL (ref 8.5–10.5)
CHLORIDE BLD-SCNC: 105 MMOL/L (ref 98–107)
CO2 SERPL-SCNC: 26 MMOL/L (ref 22–31)
CREAT SERPL-MCNC: 1.12 MG/DL (ref 0.7–1.3)
GFR SERPL CREATININE-BSD FRML MDRD: >60 ML/MIN/1.73M2
GLUCOSE BLD-MCNC: 79 MG/DL (ref 70–125)
HGB BLD-MCNC: 11.1 G/DL (ref 14–18)
POTASSIUM BLD-SCNC: 3.5 MMOL/L (ref 3.5–5)
SODIUM SERPL-SCNC: 138 MMOL/L (ref 136–145)

## 2019-01-29 NOTE — TELEPHONE ENCOUNTER
Late entry for 1/28/19  Daughter called yesterday and had a several questions related to her dads discharge from TCU (will be on 1/29) needing direction on what to do.  Wanting to know what to do as it relates to checking her BP and HR.  Currently he has parameters that are attached to his metoprolol while in the controlled setting.  Recommended that patient discuss with NP that is following patient in the TCU.  Home care services are currently set up for patient at discharge.  Daughter is planning on purchasing a BP machine for home monitoring.  Patient scheduled for AVNA and PPM placement on 2/13.   Instructed daughter to contact us if she had any further questions after she talks to NP at TCU unit where her dad is currently residing.  VEDA Gallo

## 2019-01-30 ENCOUNTER — TELEPHONE (OUTPATIENT)
Dept: CARDIOLOGY | Facility: CLINIC | Age: 84
End: 2019-01-30

## 2019-01-30 NOTE — TELEPHONE ENCOUNTER
Pt daughter Velma called as to how to give the Metoprolol.  States that she was told to check BP and HR and if below guidelines of /60 an HR less than 60 to hold Metoprolol another person had stated to hold and then hydrate pt and check again in an hour or two and BP and HR ok to give metoprolol.  Have ask that daughter keep track of BP and take each morning and night and that if is low along with HR to hold as stated and then to check again later in the day or to wait until evening and check BP again and give if able.  Reminded daughter that pt is also on Diltiazem and that will also keep pt HR and BP down.  Pt has been eating more now that he is home and more active, which also may start bringing pt BP and HR up more. Told Velma that if there are any questions to please call the A Fib nurse line and we can help.  NO further questions at this time. [place]

## 2019-02-02 ENCOUNTER — MEDICAL CORRESPONDENCE (OUTPATIENT)
Dept: HEALTH INFORMATION MANAGEMENT | Facility: CLINIC | Age: 84
End: 2019-02-02

## 2019-02-04 ENCOUNTER — TELEPHONE (OUTPATIENT)
Dept: CARDIOLOGY | Facility: CLINIC | Age: 84
End: 2019-02-04

## 2019-02-04 ENCOUNTER — OFFICE VISIT (OUTPATIENT)
Dept: FAMILY MEDICINE | Facility: CLINIC | Age: 84
End: 2019-02-04
Payer: COMMERCIAL

## 2019-02-04 VITALS
BODY MASS INDEX: 18.22 KG/M2 | OXYGEN SATURATION: 94 % | TEMPERATURE: 96.8 F | DIASTOLIC BLOOD PRESSURE: 76 MMHG | HEIGHT: 74 IN | WEIGHT: 142 LBS | HEART RATE: 74 BPM | SYSTOLIC BLOOD PRESSURE: 117 MMHG

## 2019-02-04 DIAGNOSIS — I50.9 CONGESTIVE HEART FAILURE, UNSPECIFIED HF CHRONICITY, UNSPECIFIED HEART FAILURE TYPE (H): Primary | ICD-10-CM

## 2019-02-04 DIAGNOSIS — I48.91 ATRIAL FIBRILLATION WITH RVR (H): ICD-10-CM

## 2019-02-04 DIAGNOSIS — F02.81 ALZHEIMER'S DEMENTIA WITH BEHAVIORAL DISTURBANCE, UNSPECIFIED TIMING OF DEMENTIA ONSET: ICD-10-CM

## 2019-02-04 DIAGNOSIS — G30.9 ALZHEIMER'S DEMENTIA WITH BEHAVIORAL DISTURBANCE, UNSPECIFIED TIMING OF DEMENTIA ONSET: ICD-10-CM

## 2019-02-04 PROCEDURE — 99204 OFFICE O/P NEW MOD 45 MIN: CPT | Performed by: INTERNAL MEDICINE

## 2019-02-04 RX ORDER — TRAZODONE HYDROCHLORIDE 100 MG/1
100 TABLET ORAL AT BEDTIME
COMMUNITY
End: 2019-02-07

## 2019-02-04 RX ORDER — MELATONIN 10 MG
6 CAPSULE ORAL AT BEDTIME
Status: ON HOLD | COMMUNITY
End: 2019-02-14

## 2019-02-04 RX ORDER — LORAZEPAM 0.5 MG/1
TABLET ORAL
Qty: 20 TABLET | Refills: 0 | Status: SHIPPED | OUTPATIENT
Start: 2019-02-04 | End: 2019-03-06

## 2019-02-04 ASSESSMENT — MIFFLIN-ST. JEOR: SCORE: 1418.86

## 2019-02-04 NOTE — PROGRESS NOTES
SUBJECTIVE:   Paul Milligan is a 83 year old male who presents to clinic today for the following health issues:    Paul Milligan is accompanied today by daughters.      Hospital Follow-up Visit:  Hospital/Nursing Home/IP Rehab Facility: [facility]  Date of Admission: December, 21 st 2018  Date of Discharge: December 28 th 2018  Reason(s) for Admission: Heart Concerns, uncontrolled afib, with accompanied dementia and confusion.             Problems taking medications regularly:  None       Medication changes since discharge: None       Problems adhering to non-medication therapy:  None    Summary of hospitalization:  [facility] discharge summary reviewed  Diagnostic Tests/Treatments reviewed.  Follow up needed: none  Other Healthcare Providers Involved in Patient s Care:         None  Update since discharge: improved.     Post Discharge Medication Reconciliation: discharge medications reconciled, continue medications without change.  Plan of care communicated with patient and family     Coding guidelines for this visit:  Type of Medical   Decision Making Face-to-Face Visit       within 7 Days of discharge Face-to-Face Visit        within 14 days of discharge   Moderate Complexity [id number]   High Complexity [id number]     Paul is being cared for by his daughter as he doesn't recall his trip to the ER. He's currently on Seroquel.  which has been helping. His daughter notes that Paul has been doing decent with his sleep but may need a stronger prescription than melatonin, possibly Trazodone. The dementia is still persistent which makes daily instructions relatively difficult to proceed throughout the day. Exercise routine include: mall walks and walking 2 miles prior to hospitalization. The daughter also notes a complication with difficulty in swallowing and had a prior choking episode. Also experienced over sedation in the TCU.       Problem list and histories reviewed & adjusted,  as indicated.  Additional history: as documented    Patient Active Problem List   Diagnosis     Atrial fibrillation with RVR (H)     CHF (congestive heart failure) (H) EF 45%     Dementia     Past Surgical History:   Procedure Laterality Date     ORTHOPEDIC SURGERY         Social History     Tobacco Use     Smoking status: Never Smoker     Smokeless tobacco: Never Used   Substance Use Topics     Alcohol use: No     Frequency: Never     History reviewed. No pertinent family history.      Current Outpatient Medications   Medication Sig Dispense Refill     Ascorbic Acid (VITAMIN C PO) Take 500 mg by mouth daily        LORazepam (ATIVAN) 0.5 MG tablet Sig 1/2- 1 po tid prn anxiousness 20 tablet 0     Melatonin 10 MG CAPS        metoprolol tartrate (LOPRESSOR) 25 MG tablet Take 25 mg by mouth 2 times daily Hold HR <60 bpm, hold < 100/60       traZODone (DESYREL) 100 MG tablet Take 100 mg by mouth At Bedtime       Allergies   Allergen Reactions     No Known Allergies      Labs reviewed in EPIC    Reviewed and updated as needed this visit by clinical staff  Tobacco  Allergies  Meds  Problems  Med Hx  Surg Hx  Fam Hx       Reviewed and updated as needed this visit by Provider         ROS:  CONSTITUTIONAL: NEGATIVE for fever, chills, change in weight  INTEGUMENTARY/SKIN: NEGATIVE for worrisome rashes, moles or lesions  EYES: NEGATIVE for vision changes or irritation  ENT/MOUTH: NEGATIVE for ear, mouth and throat problems  RESP: NEGATIVE for significant cough or SOB  BREAST: NEGATIVE for masses, tenderness or discharge  CV: NEGATIVE for chest pain, palpitations or peripheral edema  GI: NEGATIVE for nausea, abdominal pain, heartburn, or change in bowel habits  : NEGATIVE for frequency, dysuria, or hematuria  MUSCULOSKELETAL: NEGATIVE for significant arthralgias or myalgia  NEURO: NEGATIVE for weakness, dizziness or paresthesias  ENDOCRINE: NEGATIVE for temperature intolerance, skin/hair changes  HEME: NEGATIVE for  "bleeding problems  PSYCHIATRIC: NEGATIVE for changes in mood or affect  This document serves as a record of the services and decisions personally performed and made by Ismael العلي MD. It was created on his behalf by Omar Treviño, a trained medical scribe. The creation of this document is based the provider's statements to the medical scribe.  Scribe Omar Treviño 3:43 PM, February 4, 2019    OBJECTIVE:   /76 (BP Location: Left arm, Patient Position: Sitting, Cuff Size: Adult Small)   Pulse 74   Temp 96.8  F (36  C) (Oral)   Ht 1.88 m (6' 2\")   Wt 64.4 kg (142 lb)   SpO2 94%   BMI 18.23 kg/m    Body mass index is 18.23 kg/m .   General: Introverted, anxious, agitated white male  Neck was supple without adenopathy or thyromegaly his carotids were normal without bruits  Chest clear to auscultation and percussion  Cardiovascular S1 and S2 was irregularly irregular with a well controlled ventricular response at 94.without murmurs or gallops  Abdomen bowel sounds were normal.  There is no palpable mass or organomegaly  Extremities nontender without any edema  Pulses pedal pulses are as described otherwise his pulses are bilaterally symmetrical throughout without bruits  Skin without significant abnormality  Necks weren't evaluable.    ASSESSMENT/PLAN:   Paul was seen today for hospital f/u.    Diagnoses and all orders for this visit:    Congestive heart failure, unspecified HF chronicity, unspecified heart failure type (H)  Long discussion regarding appropriate follow up. Anticipated outcome with his ablation and recommended he should have a swallow study done while the hospital. Also, as he will probably become more agitated with the upcoming surgery and doctor's visit we recommended giving Ativan prn and watching closely as to not over sedate him.      Alzheimer's dementia with behavioral disturbance, unspecified timing of dementia onset  After he's done with his surgery, to return to clinic to follow up with his " dementia because there may be benefits of medications for his memory and other recommendations.   -     LORazepam (ATIVAN) 0.5 MG tablet; Sig 1/2- 1 po tid prn anxiousness    Atrial fibrillation with RVR (H)  Rate controlled with anticipated ablation and probable pacemaker    The information in this document, created by the medical scribe for me, accurately reflects the services I personally performed and the decisions made by me. I have reviewed and approved this document for accuracy prior to leaving the patient care area.  4:30 PM, 02/04/19  32 minutes spent in evaluating and reviewing his complex condition and 50% or more was spent with coordinating his care and reviewing the anticipated needs with his daughters  Ismael العلي MD  [facility]

## 2019-02-04 NOTE — TELEPHONE ENCOUNTER
Pt daughter called and stated that pt was having trouble breathing last night and felt it was the toughest night since being home.  Pt made a variety of different sounds, but after the wedge was placed pt did better. Pt is is lightheaded and off balance and night and thus needing assistance to the bathroom, of which he make multiple trips at night.  Pt daughter states that pt does not have swollen ankles as he did in the past. Discussed that pt may need to have the wedge each night to sleep and that pt should have fluids stopped at a certain time prior to bed time, of which daughter states that stop them 2 hours before bedtime. Pt med list includes many over the counter meds, of which daughter states that pt is not taking at this time.  Pt used to read [employer] magazine and treat his A fib with the information.  Pt has an OV with Dr العلي today and then will see Dr Che on 2/7. [place]

## 2019-02-05 DIAGNOSIS — I49.5 SSS (SICK SINUS SYNDROME) (H): Primary | ICD-10-CM

## 2019-02-05 DIAGNOSIS — I48.91 ATRIAL FIBRILLATION WITH RVR (H): ICD-10-CM

## 2019-02-07 ENCOUNTER — OFFICE VISIT (OUTPATIENT)
Dept: CARDIOLOGY | Facility: CLINIC | Age: 84
End: 2019-02-07
Payer: COMMERCIAL

## 2019-02-07 VITALS
DIASTOLIC BLOOD PRESSURE: 82 MMHG | HEIGHT: 74 IN | SYSTOLIC BLOOD PRESSURE: 136 MMHG | BODY MASS INDEX: 18.48 KG/M2 | HEART RATE: 68 BPM | WEIGHT: 144 LBS

## 2019-02-07 DIAGNOSIS — I48.91 ATRIAL FIBRILLATION WITH RVR (H): ICD-10-CM

## 2019-02-07 PROCEDURE — 99214 OFFICE O/P EST MOD 30 MIN: CPT | Performed by: INTERNAL MEDICINE

## 2019-02-07 RX ORDER — DILTIAZEM HYDROCHLORIDE 120 MG/1
120 CAPSULE, COATED, EXTENDED RELEASE ORAL EVERY MORNING
Status: ON HOLD | COMMUNITY
End: 2019-02-14

## 2019-02-07 ASSESSMENT — MIFFLIN-ST. JEOR: SCORE: 1427.93

## 2019-02-07 NOTE — LETTER
2/7/2019    Ismael العلي MD  [address]  [place] [address]    RE: Paul Milligan       Dear Colleague,    I had the pleasure of seeing Paul Milligan in the [facility].    HPI and Plan:   Thank you for allowing me to participate in the care of this very delightful patient.  As you know, Paul is a 83-year-old gentleman with history of dementia and long-lasting persistent atrial fibrillation whom I had a pleasure meeting for the first time this past Christmas when he was hospitalized for UTI and noted to be in atrial fibrillation with rapid ventricular response at 140 bpm.  Since discharge from the hospital the rate has been quite difficult to control with the most recent monitor demonstrated an average at 120 bpm with a loss of about 58 and a max of 193.  In the past with a higher dose of AV rigo blocking agent there was several significant pauses.  Patient and his family are here to discuss option of AV node ablation pacemaker.    Patient was given Ativan earlier today for his agitation as a part of his overall dementia.  He he was not verbal during this interview but was cooperative.  I discussed at length regarding the rationale of AV node ablation and pacemaker implantation which they would like to be in the right side as he is left-handed.  Because of the rapid rate there is a small risk of tachycardia induced cardiomyopathy which the patient may already have experienced with the latest EF of 45-50%.  I went over the procedure in detail with risks including but not limited to vascular injury, pneumothorax and infection.  Patient does need to stay overnight for observation.  I asked the family to hold Eliquis with the last dose on the evening of the 10th with procedure date scheduled for the 13th of this month.  Both calcium channel and beta blocker may be discontinued after the procedure.    No orders of the defined types were placed in this  encounter.      Orders Placed This Encounter   Medications     apixaban ANTICOAGULANT (ELIQUIS) 5 MG tablet     Sig: Take 5 mg by mouth 2 times daily     diltiazem ER COATED BEADS (DILTIAZEM CD) 120 MG 24 hr capsule     Sig: Take 120 mg by mouth every morning       Medications Discontinued During This Encounter   Medication Reason     traZODone (DESYREL) 100 MG tablet Stopped by Patient         Encounter Diagnosis   Name Primary?     Atrial fibrillation with RVR (H)        CURRENT MEDICATIONS:  Current Outpatient Medications   Medication Sig Dispense Refill     apixaban ANTICOAGULANT (ELIQUIS) 5 MG tablet Take 5 mg by mouth 2 times daily       Ascorbic Acid (VITAMIN C PO) Take 500 mg by mouth daily        diltiazem ER COATED BEADS (DILTIAZEM CD) 120 MG 24 hr capsule Take 120 mg by mouth every morning       LORazepam (ATIVAN) 0.5 MG tablet Sig 1/2- 1 po tid prn anxiousness 20 tablet 0     Melatonin 10 MG CAPS Take 6 mg by mouth At Bedtime        metoprolol tartrate (LOPRESSOR) 25 MG tablet Take 25 mg by mouth 2 times daily Hold HR <60 bpm, hold < 100/60         ALLERGIES     Allergies   Allergen Reactions     No Known Allergies        PAST MEDICAL HISTORY:  History reviewed. No pertinent past medical history.    PAST SURGICAL HISTORY:  Past Surgical History:   Procedure Laterality Date     ORTHOPEDIC SURGERY         FAMILY HISTORY:  History reviewed. No pertinent family history.    SOCIAL HISTORY:  Social History     Socioeconomic History     Marital status:      Spouse name: None     Number of children: None     Years of education: None     Highest education level: None   Social Needs     Financial resource strain: None     Food insecurity - worry: None     Food insecurity - inability: None     Transportation needs - medical: None     Transportation needs - non-medical: None   Occupational History     None   Tobacco Use     Smoking status: Never Smoker     Smokeless tobacco: Never Used   Substance and Sexual  "Activity     Alcohol use: No     Frequency: Never     Drug use: No     Sexual activity: None   Other Topics Concern     None   Social History Narrative     None       Review of Systems:  Skin:  Negative       Eyes:  Negative      ENT:  Positive for nasal congestion;postnasal drainage;sinus trouble    Respiratory:  Positive for cough;dyspnea on exertion     Cardiovascular:    Positive for;edema;fatigue    Gastroenterology: Negative      Genitourinary:  Positive for urinary frequency;incontinence    Musculoskeletal:  Positive for muscular weakness    Neurologic:  Positive for memory problems    Psychiatric:  Negative      Heme/Lymph/Imm:  Positive for allergies    Endocrine:  Negative        Physical Exam:  Vitals: /82   Pulse 68   Ht 1.88 m (6' 2\")   Wt 65.3 kg (144 lb)   BMI 18.49 kg/m       Constitutional:  cooperative, alert and oriented, well developed, well nourished, in no acute distress        Skin:  warm and dry to the touch, no apparent skin lesions or masses noted          Head:  normocephalic, no masses or lesions        Eyes:  pupils equal and round, conjunctivae and lids unremarkable, sclera white, no xanthalasma, EOMS intact, no nystagmus        Lymph:No Cervical lymphadenopathy present     ENT:  no pallor or cyanosis, dentition good        Neck:  carotid pulses are full and equal bilaterally, JVP normal, no carotid bruit        Respiratory:  normal breath sounds, clear to auscultation, normal A-P diameter, normal symmetry, normal respiratory excursion, no use of accessory muscles         Cardiac:   irregularly irregular rhythm              pulses full and equal, no bruits auscultated                                        GI:  abdomen soft, non-tender, BS normoactive, no mass, no HSM, no bruits        Extremities and Muscular Skeletal:  no deformities, clubbing, cyanosis, erythema observed              Neurological:  no gross motor deficits        Psych:             Thank you for allowing me " to participate in the care of your patient.    Sincerely,     Eder Lake MD     [facility]

## 2019-02-07 NOTE — PROGRESS NOTES
HPI and Plan:   Thank you for allowing me to participate in the care of this very delightful patient.  As you know, Paul is a 83-year-old gentleman with history of dementia and long-lasting persistent atrial fibrillation whom I had a pleasure meeting for the first time this past Christmas when he was hospitalized for UTI and noted to be in atrial fibrillation with rapid ventricular response at 140 bpm.  Since discharge from the hospital the rate has been quite difficult to control with the most recent monitor demonstrated an average at 120 bpm with a loss of about 58 and a max of 193.  In the past with a higher dose of AV rigo blocking agent there was several significant pauses.  Patient and his family are here to discuss option of AV node ablation pacemaker.    Patient was given Ativan earlier today for his agitation as a part of his overall dementia.  He he was not verbal during this interview but was cooperative.  I discussed at length regarding the rationale of AV node ablation and pacemaker implantation which they would like to be in the right side as he is left-handed.  Because of the rapid rate there is a small risk of tachycardia induced cardiomyopathy which the patient may already have experienced with the latest EF of 45-50%.  I went over the procedure in detail with risks including but not limited to vascular injury, pneumothorax and infection.  Patient does need to stay overnight for observation.  I asked the family to hold Eliquis with the last dose on the evening of the 10th with procedure date scheduled for the 13th of this month.  Both calcium channel and beta blocker may be discontinued after the procedure.    No orders of the defined types were placed in this encounter.      Orders Placed This Encounter   Medications     apixaban ANTICOAGULANT (ELIQUIS) 5 MG tablet     Sig: Take 5 mg by mouth 2 times daily     diltiazem ER COATED BEADS (DILTIAZEM CD) 120 MG 24 hr capsule     Sig: Take 120 mg by  mouth every morning       Medications Discontinued During This Encounter   Medication Reason     traZODone (DESYREL) 100 MG tablet Stopped by Patient         Encounter Diagnosis   Name Primary?     Atrial fibrillation with RVR (H)        CURRENT MEDICATIONS:  Current Outpatient Medications   Medication Sig Dispense Refill     apixaban ANTICOAGULANT (ELIQUIS) 5 MG tablet Take 5 mg by mouth 2 times daily       Ascorbic Acid (VITAMIN C PO) Take 500 mg by mouth daily        diltiazem ER COATED BEADS (DILTIAZEM CD) 120 MG 24 hr capsule Take 120 mg by mouth every morning       LORazepam (ATIVAN) 0.5 MG tablet Sig 1/2- 1 po tid prn anxiousness 20 tablet 0     Melatonin 10 MG CAPS Take 6 mg by mouth At Bedtime        metoprolol tartrate (LOPRESSOR) 25 MG tablet Take 25 mg by mouth 2 times daily Hold HR <60 bpm, hold < 100/60         ALLERGIES     Allergies   Allergen Reactions     No Known Allergies        PAST MEDICAL HISTORY:  History reviewed. No pertinent past medical history.    PAST SURGICAL HISTORY:  Past Surgical History:   Procedure Laterality Date     ORTHOPEDIC SURGERY         FAMILY HISTORY:  History reviewed. No pertinent family history.    SOCIAL HISTORY:  Social History     Socioeconomic History     Marital status:      Spouse name: None     Number of children: None     Years of education: None     Highest education level: None   Social Needs     Financial resource strain: None     Food insecurity - worry: None     Food insecurity - inability: None     Transportation needs - medical: None     Transportation needs - non-medical: None   Occupational History     None   Tobacco Use     Smoking status: Never Smoker     Smokeless tobacco: Never Used   Substance and Sexual Activity     Alcohol use: No     Frequency: Never     Drug use: No     Sexual activity: None   Other Topics Concern     None   Social History Narrative     None       Review of Systems:  Skin:  Negative       Eyes:  Negative      ENT:   "Positive for nasal congestion;postnasal drainage;sinus trouble    Respiratory:  Positive for cough;dyspnea on exertion     Cardiovascular:    Positive for;edema;fatigue    Gastroenterology: Negative      Genitourinary:  Positive for urinary frequency;incontinence    Musculoskeletal:  Positive for muscular weakness    Neurologic:  Positive for memory problems    Psychiatric:  Negative      Heme/Lymph/Imm:  Positive for allergies    Endocrine:  Negative        Physical Exam:  Vitals: /82   Pulse 68   Ht 1.88 m (6' 2\")   Wt 65.3 kg (144 lb)   BMI 18.49 kg/m      Constitutional:  cooperative, alert and oriented, well developed, well nourished, in no acute distress        Skin:  warm and dry to the touch, no apparent skin lesions or masses noted          Head:  normocephalic, no masses or lesions        Eyes:  pupils equal and round, conjunctivae and lids unremarkable, sclera white, no xanthalasma, EOMS intact, no nystagmus        Lymph:No Cervical lymphadenopathy present     ENT:  no pallor or cyanosis, dentition good        Neck:  carotid pulses are full and equal bilaterally, JVP normal, no carotid bruit        Respiratory:  normal breath sounds, clear to auscultation, normal A-P diameter, normal symmetry, normal respiratory excursion, no use of accessory muscles         Cardiac:   irregularly irregular rhythm              pulses full and equal, no bruits auscultated                                        GI:  abdomen soft, non-tender, BS normoactive, no mass, no HSM, no bruits        Extremities and Muscular Skeletal:  no deformities, clubbing, cyanosis, erythema observed              Neurological:  no gross motor deficits        Psych:           CC  Alicia Salazar, APRN CNP  [address]  [place] [address]              "

## 2019-02-07 NOTE — LETTER
2/7/2019    Ismael العلي MD  [address]  [place] [address]    RE: Paul Milligan       Dear Colleague,    I had the pleasure of seeing Paul Milligan in the [facility].    HPI and Plan:   Thank you for allowing me to participate in the care of this very delightful patient.  As you know, Paul is a 83-year-old gentleman with history of dementia and long-lasting persistent atrial fibrillation whom I had a pleasure meeting for the first time this past Christmas when he was hospitalized for UTI and noted to be in atrial fibrillation with rapid ventricular response at 140 bpm.  Since discharge from the hospital the rate has been quite difficult to control with the most recent monitor demonstrated an average at 120 bpm with a loss of about 58 and a max of 193.  In the past with a higher dose of AV rigo blocking agent there was several significant pauses.  Patient and his family are here to discuss option of AV node ablation pacemaker.    Patient was given Ativan earlier today for his agitation as a part of his overall dementia.  He he was not verbal during this interview but was cooperative.  I discussed at length regarding the rationale of AV node ablation and pacemaker implantation which they would like to be in the right side as he is left-handed.  Because of the rapid rate there is a small risk of tachycardia induced cardiomyopathy which the patient may already have experienced with the latest EF of 45-50%.  I went over the procedure in detail with risks including but not limited to vascular injury, pneumothorax and infection.  Patient does need to stay overnight for observation.  I asked the family to hold Eliquis with the last dose on the evening of the 10th with procedure date scheduled for the 13th of this month.  Both calcium channel and beta blocker may be discontinued after the procedure.    No orders of the defined types were placed in this  encounter.      Orders Placed This Encounter   Medications     apixaban ANTICOAGULANT (ELIQUIS) 5 MG tablet     Sig: Take 5 mg by mouth 2 times daily     diltiazem ER COATED BEADS (DILTIAZEM CD) 120 MG 24 hr capsule     Sig: Take 120 mg by mouth every morning       Medications Discontinued During This Encounter   Medication Reason     traZODone (DESYREL) 100 MG tablet Stopped by Patient         Encounter Diagnosis   Name Primary?     Atrial fibrillation with RVR (H)        CURRENT MEDICATIONS:  Current Outpatient Medications   Medication Sig Dispense Refill     apixaban ANTICOAGULANT (ELIQUIS) 5 MG tablet Take 5 mg by mouth 2 times daily       Ascorbic Acid (VITAMIN C PO) Take 500 mg by mouth daily        diltiazem ER COATED BEADS (DILTIAZEM CD) 120 MG 24 hr capsule Take 120 mg by mouth every morning       LORazepam (ATIVAN) 0.5 MG tablet Sig 1/2- 1 po tid prn anxiousness 20 tablet 0     Melatonin 10 MG CAPS Take 6 mg by mouth At Bedtime        metoprolol tartrate (LOPRESSOR) 25 MG tablet Take 25 mg by mouth 2 times daily Hold HR <60 bpm, hold < 100/60         ALLERGIES     Allergies   Allergen Reactions     No Known Allergies        PAST MEDICAL HISTORY:  History reviewed. No pertinent past medical history.    PAST SURGICAL HISTORY:  Past Surgical History:   Procedure Laterality Date     ORTHOPEDIC SURGERY         FAMILY HISTORY:  History reviewed. No pertinent family history.    SOCIAL HISTORY:  Social History     Socioeconomic History     Marital status:      Spouse name: None     Number of children: None     Years of education: None     Highest education level: None   Social Needs     Financial resource strain: None     Food insecurity - worry: None     Food insecurity - inability: None     Transportation needs - medical: None     Transportation needs - non-medical: None   Occupational History     None   Tobacco Use     Smoking status: Never Smoker     Smokeless tobacco: Never Used   Substance and Sexual  "Activity     Alcohol use: No     Frequency: Never     Drug use: No     Sexual activity: None   Other Topics Concern     None   Social History Narrative     None       Review of Systems:  Skin:  Negative       Eyes:  Negative      ENT:  Positive for nasal congestion;postnasal drainage;sinus trouble    Respiratory:  Positive for cough;dyspnea on exertion     Cardiovascular:    Positive for;edema;fatigue    Gastroenterology: Negative      Genitourinary:  Positive for urinary frequency;incontinence    Musculoskeletal:  Positive for muscular weakness    Neurologic:  Positive for memory problems    Psychiatric:  Negative      Heme/Lymph/Imm:  Positive for allergies    Endocrine:  Negative        Physical Exam:  Vitals: /82   Pulse 68   Ht 1.88 m (6' 2\")   Wt 65.3 kg (144 lb)   BMI 18.49 kg/m       Constitutional:  cooperative, alert and oriented, well developed, well nourished, in no acute distress        Skin:  warm and dry to the touch, no apparent skin lesions or masses noted          Head:  normocephalic, no masses or lesions        Eyes:  pupils equal and round, conjunctivae and lids unremarkable, sclera white, no xanthalasma, EOMS intact, no nystagmus        Lymph:No Cervical lymphadenopathy present     ENT:  no pallor or cyanosis, dentition good        Neck:  carotid pulses are full and equal bilaterally, JVP normal, no carotid bruit        Respiratory:  normal breath sounds, clear to auscultation, normal A-P diameter, normal symmetry, normal respiratory excursion, no use of accessory muscles         Cardiac:   irregularly irregular rhythm              pulses full and equal, no bruits auscultated                                        GI:  abdomen soft, non-tender, BS normoactive, no mass, no HSM, no bruits        Extremities and Muscular Skeletal:  no deformities, clubbing, cyanosis, erythema observed              Neurological:  no gross motor deficits        Psych:           CC  Alicia Cade " Martin, PAUL CNP  [address]  [place] [id number]                Thank you for allowing me to participate in the care of your patient.      Sincerely,     Eder Lake MD     [facility]    cc:   Alicia Salazar, PAUL CNP  [address]  [place] [id number]

## 2019-02-08 ENCOUNTER — TELEPHONE (OUTPATIENT)
Dept: CARDIOLOGY | Facility: CLINIC | Age: 84
End: 2019-02-08

## 2019-02-08 ENCOUNTER — DOCUMENTATION ONLY (OUTPATIENT)
Dept: CARDIOLOGY | Facility: CLINIC | Age: 84
End: 2019-02-08

## 2019-02-08 RX ORDER — SODIUM CHLORIDE 450 MG/100ML
INJECTION, SOLUTION INTRAVENOUS CONTINUOUS
Status: CANCELLED | OUTPATIENT
Start: 2019-02-08

## 2019-02-08 RX ORDER — LIDOCAINE 40 MG/G
CREAM TOPICAL
Status: CANCELLED | OUTPATIENT
Start: 2019-02-08

## 2019-02-08 RX ORDER — CEFAZOLIN SODIUM 2 G/100ML
2 INJECTION, SOLUTION INTRAVENOUS
Status: CANCELLED | OUTPATIENT
Start: 2019-02-08

## 2019-02-08 NOTE — TELEPHONE ENCOUNTER
Faxed Dr Mondragon last OV note, ECG's and last echo to [employer] due to denial received for AVNA with PPM implant. VEDA Grossman

## 2019-02-08 NOTE — TELEPHONE ENCOUNTER
Called patient with pre-procedure instructions for device implant:     Anticoagulation: Take eliquis on the evening of 2/10/19 and then hold until procedure    Oral diabetes meds: N/A  Insulin: N/A  Diuretic: N/A  Contrast allergy: None   Pt informed to be NPO at midnight    Pt has post-procedure transportation and 24 hours monitoring set up.   Pt aware of no driving for 24 hours post procedure due to sedation.     Pt aware of arrival time (6:30am) and location. Pt verbalized understanding of instructions.     BROOKS, RN

## 2019-02-12 ENCOUNTER — DOCUMENTATION ONLY (OUTPATIENT)
Dept: CARDIOLOGY | Facility: CLINIC | Age: 84
End: 2019-02-12

## 2019-02-12 NOTE — TELEPHONE ENCOUNTER
Attempted to call patient's daughter back. The cell phone number given did not work. Left message on home phone informing that Alyssa faxed information to [employer], also give them code numbers of 149.5 and 295 to give [employer] when they call themselves to find out if the procedure has been approved.

## 2019-02-12 NOTE — TELEPHONE ENCOUNTER
"Today I spent time corresponding between Patients daughter and [employer] regarding delay in approval of procedure scheduled for tomorrow. Daughter was very upset when I stated that it may be 4-5 days before claim is approved (Per [facility] [employer] as per the conversation they had with [employer]-part of [employer]). Further discussion with daughter~Agatha in [employer] it ended with [employer] stating the would \"walk over the request to an MD immediatly\", they would get back in the AM. Family stared they were going to proceed and come in tomorrow (Check in is at 6:30 AM) - prior to Financial records opening and determining final answer. VEDA Grossman  "

## 2019-02-13 ENCOUNTER — SURGERY (OUTPATIENT)
Age: 84
End: 2019-02-13
Payer: COMMERCIAL

## 2019-02-13 ENCOUNTER — HOSPITAL ENCOUNTER (OUTPATIENT)
Facility: CLINIC | Age: 84
Discharge: HOME | End: 2019-02-14
Attending: INTERNAL MEDICINE | Admitting: INTERNAL MEDICINE
Payer: COMMERCIAL

## 2019-02-13 ENCOUNTER — TELEPHONE (OUTPATIENT)
Dept: FAMILY MEDICINE | Facility: CLINIC | Age: 84
End: 2019-02-13

## 2019-02-13 ENCOUNTER — APPOINTMENT (OUTPATIENT)
Dept: SPEECH THERAPY | Facility: CLINIC | Age: 84
End: 2019-02-13
Attending: INTERNAL MEDICINE
Payer: COMMERCIAL

## 2019-02-13 DIAGNOSIS — I49.5 SSS (SICK SINUS SYNDROME) (H): ICD-10-CM

## 2019-02-13 DIAGNOSIS — G47.00 PERSISTENT INSOMNIA: Primary | ICD-10-CM

## 2019-02-13 PROBLEM — I48.20 CHRONIC A-FIB (H): Status: ACTIVE | Noted: 2019-02-13

## 2019-02-13 LAB
ANION GAP SERPL CALCULATED.3IONS-SCNC: 5 MMOL/L (ref 3–14)
BUN SERPL-MCNC: 12 MG/DL (ref 7–30)
CALCIUM SERPL-MCNC: 9.2 MG/DL (ref 8.5–10.1)
CHLORIDE SERPL-SCNC: 102 MMOL/L (ref 94–109)
CO2 SERPL-SCNC: 29 MMOL/L (ref 20–32)
CREAT SERPL-MCNC: 1.06 MG/DL (ref 0.66–1.25)
ERYTHROCYTE [DISTWIDTH] IN BLOOD BY AUTOMATED COUNT: 14.7 % (ref 10–15)
GFR SERPL CREATININE-BSD FRML MDRD: 65 ML/MIN/{1.73_M2}
GLUCOSE BLDC GLUCOMTR-MCNC: 93 MG/DL (ref 70–99)
GLUCOSE SERPL-MCNC: 80 MG/DL (ref 70–99)
HCT VFR BLD AUTO: 40.1 % (ref 40–53)
HGB BLD-MCNC: 13.6 G/DL (ref 13.3–17.7)
MCH RBC QN AUTO: 33.3 PG (ref 26.5–33)
MCHC RBC AUTO-ENTMCNC: 33.9 G/DL (ref 31.5–36.5)
MCV RBC AUTO: 98 FL (ref 78–100)
PLATELET # BLD AUTO: 251 10E9/L (ref 150–450)
POTASSIUM SERPL-SCNC: 4.1 MMOL/L (ref 3.4–5.3)
RBC # BLD AUTO: 4.09 10E12/L (ref 4.4–5.9)
SODIUM SERPL-SCNC: 136 MMOL/L (ref 133–144)
WBC # BLD AUTO: 5.2 10E9/L (ref 4–11)

## 2019-02-13 PROCEDURE — 40000934 ZZH STATISTIC OUTPATIENT (NON-OBS) DAY

## 2019-02-13 PROCEDURE — 93650 ICAR CATH ABLTJ AV NODE FUNC: CPT | Performed by: INTERNAL MEDICINE

## 2019-02-13 PROCEDURE — 82962 GLUCOSE BLOOD TEST: CPT

## 2019-02-13 PROCEDURE — 92610 EVALUATE SWALLOWING FUNCTION: CPT | Mod: GN | Performed by: SPEECH-LANGUAGE PATHOLOGIST

## 2019-02-13 PROCEDURE — 99153 MOD SED SAME PHYS/QHP EA: CPT | Performed by: INTERNAL MEDICINE

## 2019-02-13 PROCEDURE — 33207 INSERT HEART PM VENTRICULAR: CPT | Mod: KX | Performed by: INTERNAL MEDICINE

## 2019-02-13 PROCEDURE — 92526 ORAL FUNCTION THERAPY: CPT | Mod: GN | Performed by: SPEECH-LANGUAGE PATHOLOGIST

## 2019-02-13 PROCEDURE — 2894A VOIDCORRECT: CPT | Performed by: INTERNAL MEDICINE

## 2019-02-13 PROCEDURE — 80048 BASIC METABOLIC PNL TOTAL CA: CPT | Performed by: INTERNAL MEDICINE

## 2019-02-13 PROCEDURE — C1786 PMKR, SINGLE, RATE-RESP: HCPCS | Performed by: INTERNAL MEDICINE

## 2019-02-13 PROCEDURE — 40000852 ZZH STATISTIC HEART CATH LAB OR EP LAB

## 2019-02-13 PROCEDURE — C1898 LEAD, PMKR, OTHER THAN TRANS: HCPCS | Performed by: INTERNAL MEDICINE

## 2019-02-13 PROCEDURE — C1888 ENDOVAS NON-CARDIAC ABL CATH: HCPCS | Performed by: INTERNAL MEDICINE

## 2019-02-13 PROCEDURE — 93005 ELECTROCARDIOGRAM TRACING: CPT

## 2019-02-13 PROCEDURE — 40000065 ZZH STATISTIC EKG NON-CHARGEABLE

## 2019-02-13 PROCEDURE — C1894 INTRO/SHEATH, NON-LASER: HCPCS | Performed by: INTERNAL MEDICINE

## 2019-02-13 PROCEDURE — 25000132 ZZH RX MED GY IP 250 OP 250 PS 637: Performed by: INTERNAL MEDICINE

## 2019-02-13 PROCEDURE — 99152 MOD SED SAME PHYS/QHP 5/>YRS: CPT | Performed by: INTERNAL MEDICINE

## 2019-02-13 PROCEDURE — 40000935 ZZH STATISTIC OUTPATIENT (NON-OBS) EVE

## 2019-02-13 PROCEDURE — 93010 ELECTROCARDIOGRAM REPORT: CPT | Performed by: INTERNAL MEDICINE

## 2019-02-13 PROCEDURE — 27210794 ZZH OR GENERAL SUPPLY STERILE: Performed by: INTERNAL MEDICINE

## 2019-02-13 PROCEDURE — 40000936 ZZH STATISTIC OUTPATIENT (NON-OBS) NIGHT

## 2019-02-13 PROCEDURE — 93650 ICAR CATH ABLTJ AV NODE FUNC: CPT | Mod: 59 | Performed by: INTERNAL MEDICINE

## 2019-02-13 PROCEDURE — 40000235 ZZH STATISTIC TELEMETRY

## 2019-02-13 PROCEDURE — 25800029 ZZH RX IP 258 OP 250: Performed by: INTERNAL MEDICINE

## 2019-02-13 PROCEDURE — 25000128 H RX IP 250 OP 636: Performed by: INTERNAL MEDICINE

## 2019-02-13 PROCEDURE — 85027 COMPLETE CBC AUTOMATED: CPT | Performed by: INTERNAL MEDICINE

## 2019-02-13 PROCEDURE — 25000125 ZZHC RX 250: Performed by: INTERNAL MEDICINE

## 2019-02-13 DEVICE — PACEMAKER ACCOLADE SR MRI: Type: IMPLANTABLE DEVICE | Status: FUNCTIONAL

## 2019-02-13 DEVICE — IMPLANTABLE DEVICE: Type: IMPLANTABLE DEVICE | Status: FUNCTIONAL

## 2019-02-13 RX ORDER — PROTAMINE SULFATE 10 MG/ML
5-40 INJECTION, SOLUTION INTRAVENOUS EVERY 10 MIN PRN
Status: DISCONTINUED | OUTPATIENT
Start: 2019-02-13 | End: 2019-02-13 | Stop reason: HOSPADM

## 2019-02-13 RX ORDER — ADENOSINE 3 MG/ML
6-12 INJECTION, SOLUTION INTRAVENOUS EVERY 5 MIN PRN
Status: DISCONTINUED | OUTPATIENT
Start: 2019-02-13 | End: 2019-02-13 | Stop reason: HOSPADM

## 2019-02-13 RX ORDER — OXYCODONE AND ACETAMINOPHEN 5; 325 MG/1; MG/1
1 TABLET ORAL EVERY 4 HOURS PRN
Status: DISCONTINUED | OUTPATIENT
Start: 2019-02-13 | End: 2019-02-14 | Stop reason: HOSPADM

## 2019-02-13 RX ORDER — IBUTILIDE FUMARATE 1 MG/10ML
1 INJECTION, SOLUTION INTRAVENOUS
Status: DISCONTINUED | OUTPATIENT
Start: 2019-02-13 | End: 2019-02-13 | Stop reason: HOSPADM

## 2019-02-13 RX ORDER — LIDOCAINE 40 MG/G
CREAM TOPICAL
Status: DISCONTINUED | OUTPATIENT
Start: 2019-02-13 | End: 2019-02-14 | Stop reason: HOSPADM

## 2019-02-13 RX ORDER — KETOROLAC TROMETHAMINE 30 MG/ML
15 INJECTION, SOLUTION INTRAMUSCULAR; INTRAVENOUS
Status: DISCONTINUED | OUTPATIENT
Start: 2019-02-13 | End: 2019-02-13 | Stop reason: HOSPADM

## 2019-02-13 RX ORDER — LIDOCAINE HYDROCHLORIDE 10 MG/ML
10-30 INJECTION, SOLUTION EPIDURAL; INFILTRATION; INTRACAUDAL; PERINEURAL
Status: DISCONTINUED | OUTPATIENT
Start: 2019-02-13 | End: 2019-02-13 | Stop reason: HOSPADM

## 2019-02-13 RX ORDER — DILTIAZEM HYDROCHLORIDE 120 MG/1
120 CAPSULE, EXTENDED RELEASE ORAL EVERY MORNING
Status: DISCONTINUED | OUTPATIENT
Start: 2019-02-14 | End: 2019-02-14

## 2019-02-13 RX ORDER — HEPARIN SODIUM 1000 [USP'U]/ML
1000-10000 INJECTION, SOLUTION INTRAVENOUS; SUBCUTANEOUS EVERY 5 MIN PRN
Status: DISCONTINUED | OUTPATIENT
Start: 2019-02-13 | End: 2019-02-13 | Stop reason: HOSPADM

## 2019-02-13 RX ORDER — LIDOCAINE HYDROCHLORIDE AND EPINEPHRINE 10; 10 MG/ML; UG/ML
10-30 INJECTION, SOLUTION INFILTRATION; PERINEURAL
Status: DISCONTINUED | OUTPATIENT
Start: 2019-02-13 | End: 2019-02-13 | Stop reason: HOSPADM

## 2019-02-13 RX ORDER — LORAZEPAM 2 MG/ML
.5-2 INJECTION INTRAMUSCULAR EVERY 10 MIN PRN
Status: DISCONTINUED | OUTPATIENT
Start: 2019-02-13 | End: 2019-02-13 | Stop reason: HOSPADM

## 2019-02-13 RX ORDER — BUPIVACAINE HYDROCHLORIDE 2.5 MG/ML
10-30 INJECTION, SOLUTION EPIDURAL; INFILTRATION; INTRACAUDAL
Status: COMPLETED | OUTPATIENT
Start: 2019-02-13 | End: 2019-02-13

## 2019-02-13 RX ORDER — DIPHENHYDRAMINE HYDROCHLORIDE 50 MG/ML
25-50 INJECTION INTRAMUSCULAR; INTRAVENOUS
Status: DISCONTINUED | OUTPATIENT
Start: 2019-02-13 | End: 2019-02-13 | Stop reason: HOSPADM

## 2019-02-13 RX ORDER — NALOXONE HYDROCHLORIDE 0.4 MG/ML
0.4 INJECTION, SOLUTION INTRAMUSCULAR; INTRAVENOUS; SUBCUTANEOUS EVERY 5 MIN PRN
Status: DISCONTINUED | OUTPATIENT
Start: 2019-02-13 | End: 2019-02-13 | Stop reason: HOSPADM

## 2019-02-13 RX ORDER — MORPHINE SULFATE 2 MG/ML
1-2 INJECTION, SOLUTION INTRAMUSCULAR; INTRAVENOUS EVERY 5 MIN PRN
Status: DISCONTINUED | OUTPATIENT
Start: 2019-02-13 | End: 2019-02-13 | Stop reason: HOSPADM

## 2019-02-13 RX ORDER — ATROPINE SULFATE 0.1 MG/ML
.5-1 INJECTION INTRAVENOUS
Status: DISCONTINUED | OUTPATIENT
Start: 2019-02-13 | End: 2019-02-13 | Stop reason: HOSPADM

## 2019-02-13 RX ORDER — DOBUTAMINE HYDROCHLORIDE 200 MG/100ML
5-40 INJECTION INTRAVENOUS CONTINUOUS PRN
Status: DISCONTINUED | OUTPATIENT
Start: 2019-02-13 | End: 2019-02-13 | Stop reason: HOSPADM

## 2019-02-13 RX ORDER — FLUMAZENIL 0.1 MG/ML
0.2 INJECTION, SOLUTION INTRAVENOUS
Status: DISCONTINUED | OUTPATIENT
Start: 2019-02-13 | End: 2019-02-13 | Stop reason: HOSPADM

## 2019-02-13 RX ORDER — ASCORBIC ACID 500 MG
500 TABLET ORAL DAILY
Status: DISCONTINUED | OUTPATIENT
Start: 2019-02-13 | End: 2019-02-14 | Stop reason: HOSPADM

## 2019-02-13 RX ORDER — ASCORBIC ACID 250 MG
500 TABLET,CHEWABLE ORAL DAILY
Status: DISCONTINUED | OUTPATIENT
Start: 2019-02-13 | End: 2019-02-13

## 2019-02-13 RX ORDER — IBUTILIDE FUMARATE 1 MG/10ML
0.01 INJECTION, SOLUTION INTRAVENOUS
Status: DISCONTINUED | OUTPATIENT
Start: 2019-02-13 | End: 2019-02-13 | Stop reason: HOSPADM

## 2019-02-13 RX ORDER — PROTAMINE SULFATE 10 MG/ML
1-5 INJECTION, SOLUTION INTRAVENOUS
Status: DISCONTINUED | OUTPATIENT
Start: 2019-02-13 | End: 2019-02-13 | Stop reason: HOSPADM

## 2019-02-13 RX ORDER — METOPROLOL TARTRATE 25 MG/1
25 TABLET, FILM COATED ORAL 2 TIMES DAILY
Status: DISCONTINUED | OUTPATIENT
Start: 2019-02-13 | End: 2019-02-14

## 2019-02-13 RX ORDER — LORAZEPAM 0.5 MG/1
0.5 TABLET ORAL 3 TIMES DAILY PRN
Status: DISCONTINUED | OUTPATIENT
Start: 2019-02-13 | End: 2019-02-14 | Stop reason: HOSPADM

## 2019-02-13 RX ORDER — FENTANYL CITRATE 50 UG/ML
25-50 INJECTION, SOLUTION INTRAMUSCULAR; INTRAVENOUS
Status: DISCONTINUED | OUTPATIENT
Start: 2019-02-13 | End: 2019-02-13 | Stop reason: HOSPADM

## 2019-02-13 RX ORDER — ONDANSETRON 2 MG/ML
4 INJECTION INTRAMUSCULAR; INTRAVENOUS EVERY 4 HOURS PRN
Status: DISCONTINUED | OUTPATIENT
Start: 2019-02-13 | End: 2019-02-13 | Stop reason: HOSPADM

## 2019-02-13 RX ORDER — NALOXONE HYDROCHLORIDE 0.4 MG/ML
.1-.4 INJECTION, SOLUTION INTRAMUSCULAR; INTRAVENOUS; SUBCUTANEOUS
Status: DISCONTINUED | OUTPATIENT
Start: 2019-02-13 | End: 2019-02-14 | Stop reason: HOSPADM

## 2019-02-13 RX ORDER — LANOLIN ALCOHOL/MO/W.PET/CERES
6 CREAM (GRAM) TOPICAL AT BEDTIME
Status: DISCONTINUED | OUTPATIENT
Start: 2019-02-13 | End: 2019-02-14 | Stop reason: HOSPADM

## 2019-02-13 RX ORDER — LIDOCAINE 40 MG/G
CREAM TOPICAL
Status: DISCONTINUED | OUTPATIENT
Start: 2019-02-13 | End: 2019-02-13 | Stop reason: HOSPADM

## 2019-02-13 RX ORDER — FUROSEMIDE 10 MG/ML
20-100 INJECTION INTRAMUSCULAR; INTRAVENOUS
Status: DISCONTINUED | OUTPATIENT
Start: 2019-02-13 | End: 2019-02-13 | Stop reason: HOSPADM

## 2019-02-13 RX ORDER — LIDOCAINE HYDROCHLORIDE 10 MG/ML
10-30 INJECTION, SOLUTION EPIDURAL; INFILTRATION; INTRACAUDAL; PERINEURAL
Status: COMPLETED | OUTPATIENT
Start: 2019-02-13 | End: 2019-02-13

## 2019-02-13 RX ORDER — CEFAZOLIN SODIUM 2 G/100ML
2 INJECTION, SOLUTION INTRAVENOUS
Status: COMPLETED | OUTPATIENT
Start: 2019-02-13 | End: 2019-02-13

## 2019-02-13 RX ORDER — BUPIVACAINE HYDROCHLORIDE 2.5 MG/ML
10-30 INJECTION, SOLUTION EPIDURAL; INFILTRATION; INTRACAUDAL
Status: DISCONTINUED | OUTPATIENT
Start: 2019-02-13 | End: 2019-02-13 | Stop reason: HOSPADM

## 2019-02-13 RX ORDER — SODIUM CHLORIDE 450 MG/100ML
INJECTION, SOLUTION INTRAVENOUS CONTINUOUS
Status: DISCONTINUED | OUTPATIENT
Start: 2019-02-13 | End: 2019-02-13 | Stop reason: HOSPADM

## 2019-02-13 RX ADMIN — SODIUM CHLORIDE: 4.5 INJECTION, SOLUTION INTRAVENOUS at 07:21

## 2019-02-13 RX ADMIN — LORAZEPAM 0.5 MG: 0.5 TABLET ORAL at 21:49

## 2019-02-13 RX ADMIN — METOPROLOL TARTRATE 25 MG: 25 TABLET ORAL at 20:21

## 2019-02-13 RX ADMIN — FENTANYL CITRATE 25 MCG: 50 INJECTION, SOLUTION INTRAMUSCULAR; INTRAVENOUS at 08:54

## 2019-02-13 RX ADMIN — BUPIVACAINE HYDROCHLORIDE 10 ML: 2.5 INJECTION, SOLUTION EPIDURAL; INFILTRATION; INTRACAUDAL at 08:55

## 2019-02-13 RX ADMIN — MIDAZOLAM 1 MG: 1 INJECTION INTRAMUSCULAR; INTRAVENOUS at 09:04

## 2019-02-13 RX ADMIN — FENTANYL CITRATE 25 MCG: 50 INJECTION, SOLUTION INTRAMUSCULAR; INTRAVENOUS at 09:14

## 2019-02-13 RX ADMIN — MIDAZOLAM 1 MG: 1 INJECTION INTRAMUSCULAR; INTRAVENOUS at 08:42

## 2019-02-13 RX ADMIN — CEFAZOLIN SODIUM 2 G: 2 INJECTION, SOLUTION INTRAVENOUS at 08:30

## 2019-02-13 RX ADMIN — LORAZEPAM 0.5 MG: 0.5 TABLET ORAL at 16:58

## 2019-02-13 RX ADMIN — APIXABAN 5 MG: 5 TABLET, FILM COATED ORAL at 20:22

## 2019-02-13 RX ADMIN — LIDOCAINE HYDROCHLORIDE 10 ML: 10 INJECTION, SOLUTION EPIDURAL; INFILTRATION; INTRACAUDAL; PERINEURAL at 08:55

## 2019-02-13 RX ADMIN — BACITRACIN 25000 UNITS: 5000 INJECTION, POWDER, FOR SOLUTION INTRAMUSCULAR at 09:11

## 2019-02-13 RX ADMIN — APIXABAN 5 MG: 5 TABLET, FILM COATED ORAL at 13:56

## 2019-02-13 RX ADMIN — MELATONIN TAB 3 MG 6 MG: 3 TAB at 20:21

## 2019-02-13 ASSESSMENT — MIFFLIN-ST. JEOR: SCORE: 1397.09

## 2019-02-13 NOTE — PROGRESS NOTES
CLINICAL SWALLOW EVALUATION       02/13/19 1500   General Information   Onset Date 02/13/19   Start of Care Date 02/13/19   Patient Profile Review/OT: Additional Occupational Profile Info See Profile for full history and prior level of function   Patient/Family Goals Statement Patient would like to drink water.    Swallowing Evaluation Bedside swallow evaluation   Behaviorial Observations Confused  (Fatigued)   Mode of current nutrition Oral diet   Type of oral diet Nectar - thick liquid  (Full liquids)   Comments Patient underwent pacemaker placement today.  Patient's PMH is significant for dementia, atrial fibrillation, and recent pneumonias.  Patient underwent SLP evaluation at TCU where he was put on a soft solid diet with nectar thick liquids.  Patient recommended to continue nectar thick liquids due to overt aspiration signs by home SLP, per patient's daughter.     Clinical Swallow Evaluation   Oral Musculature anomalies present   Structural Abnormalities none present   Dentition present and adequate   Mucosal Quality good   Mandibular Strength and Mobility intact   Oral Labial Strength and Mobility WFL   Lingual Strength and Mobility WFL   Velar Elevation intact   Buccal Strength and Mobility intact   Laryngeal Function Cough;Throat clear;Swallow;Dry swallow palpated;Voicing initiated  (Weak voice)   Additional Documentation Yes   Additional evaluation(s) completed today No   Clinical Swallow Eval: Nectar Thick Liquid Texture Trial   Mode of Presentation, Nectar cup;self-fed   Volume of Nectar Presented 3 oz   Oral Phase, [place] WFL   Oral Residue, Nectar left lip drooling   Pharyngeal Phase, Nectar impaired;repeated swallows  (4-5 swallows)   Diagnostic Statement 4-5 swallows after each sip to clear liquids   Clinical Swallow Eval: Semisolid Texture Trial   Mode of Presentation, Semisolid spoon;self-fed   Volume of Semisolid Food Presented 2 oz   Oral Phase, Semisolid WFL   Pharyngeal Phase, Semisolid intact    Diagnostic Statement No overt Sx of aspiration, slow mastication, good clearance.   Clinical Swallow Eval: Solid Food Texture Trial   Mode of Presentation, Solid self-fed   Volume of Solid Food Presented 1 cracker   Oral Phase, Solid WFL   Pharyngeal Phase, Solid intact   Diagnostic Statement No overt Sx of aspiration, slow mastication, good clearance.   Swallow Eval: Clinical Impressions   Skilled Criteria for Therapy Intervention Skilled criteria met.  Treatment indicated.   Functional Assessment Scale (FAS) 3   Dysphagia Outcome Severity Scale (JACKLYN) Level 3 - JACKLYN   Treatment Diagnosis Moderate oropharyngeal dysphagia   Diet texture recommendations Dysphagia diet level 3;Nectar thick liquids   Recommended Feeding/Eating Techniques maintain upright posture during/after eating for 30 mins;small sips/bites;other (see comments)  (Double swallow)   Therapy Frequency other (see comments)   Predicted Duration of Therapy Intervention (days/wks) 1-2x follow up, pending OBS length   Anticipated Discharge Disposition home w/ home health   Risks and Benefits of Treatment have been explained. Yes   Patient, family and/or staff in agreement with Plan of Care Yes   Clinical Impression Comments Daughter reporting baseline dysphagia with soft solids and nectar thick liquids at home.  They'd like to pursue video swallow study to see if he could have thin liquids, as he does not like thickened liquids.  Patient exhibiting generalized weakness and fatigue after pacemaker placement this am, exhibiting left sided drooling and dysarthria, weak voice.  Patient exhibited 4-5 swallows for each trial of nectar thick liquids and not appropriate for thin liquid trials at this time.  Recommend dysphagia diet level 3 when appropriate to advance post anesthesia, NECTAR THICK LIQUIDS.  Recommend PO intake only when alert, up to chair.     Total Evaluation Time   Total Evaluation Time (Minutes) 45

## 2019-02-13 NOTE — PLAN OF CARE
Discharge Planner SLP   Patient plan for discharge: Home with home care  Current status: Clinical swallow evaluation completed.  Daughter reporting baseline dysphagia with soft solids and nectar thick liquids at home.  They'd like to pursue video swallow study to see if he could have thin liquids, as he does not like thickened liquids.  Patient exhibiting generalized weakness and fatigue after pacemaker placement this am, exhibiting left sided drooling and dysarthria, weak voice.  Patient exhibited 4-5 swallows for each trial of nectar thick liquids and not appropriate for thin liquid trials at this time.  Recommend dysphagia diet level 3 when appropriate to advance post anesthesia, NECTAR THICK LIQUIDS.  Recommend PO intake only when alert, up to chair.    Barriers to return to prior living situation: Weakness.   Recommendations for discharge: Home with home SLP follow up  Rationale for recommendations: Recommend consider OP VFSS in 2-4 weeks pending progress with home SLP.  Not appropriate for video swallow study during this OBS admission due to additional fatigue, weakness today.       Entered by: Carolynn Tamayo 02/13/2019 3:31 PM

## 2019-02-13 NOTE — PROGRESS NOTES
1115 VS stable post procedure, sleepy.  Pacer site CDI, no hematoma. Family at bedside.   Pt has some problem with swallow per family on thicken diet and requested to have swallow study to be done this hospitalization.   Web text message to Dr. Che.   Report to Jose for continuing monitor pt.

## 2019-02-13 NOTE — TELEPHONE ENCOUNTER
Carolynn- Speech Supervisor  [phone]    Called back and received order and does not need us to fax anything    Carl HERNANDEZ RN

## 2019-02-13 NOTE — TELEPHONE ENCOUNTER
Our providers do not have privileges for inpatients. Called multiple numbers and never connected to the right people. Left message on a number if they can help with this to call back    Carl HERNANDEZ RN

## 2019-02-13 NOTE — PLAN OF CARE
A&Ox4. Up w/1. VSS on RA. Full liquid nectar thick liquid per dtr- plan for swallow eval due to hx of swallowing difficulties. Denies pain. R sub site with dried drainage around R outer edge, otherwise dry and intact. CMS intact. Reiterate RUE restrictions. Tele occ V-paced BBB underlying Afib CVR. Dtr, Velma, at bedside and supportive. She requests male caregivers if possible. Voided 100ml with .

## 2019-02-13 NOTE — TELEPHONE ENCOUNTER
Reason for Call:  Other     Detailed comments: pt is in [facility] needs order for video swallow study/ please fax to : [phone]  ATTN: FREDDIE    Phone Number Patient can be reached at: Other phone number:      Best Time:     Can we leave a detailed message on this number? YES    Call taken on 2/13/2019 at 11:26 AM by Yon Mcmahon

## 2019-02-13 NOTE — DISCHARGE INSTRUCTIONS
AV Node Ablation & Pacemaker Implant Discharge Instructions    After you go home:      Have an adult stay with you until tomorrow.    You may resume your normal diet.       For 24 hours - due to the sedation you received:    Relax and take it easy.    Do NOT make any important or legal decisions.    Do NOT drive or operate machines at home or at work.    Do NOT drink alcohol.    Care of Chest Incision:      Keep the bandage on at least 3 days. You may remove the dressing on Saturday. Change it only if it gets loose or soaked. If you need to change it, use 4x4-inch gauze and a large clear bandage.     If there is a pressure dressing (gauze & tape) - 24 hours after your procedure you may remove ONLY the top dressing. Leave the bottom dressing on.    Leave the strips of tape on. They will fall off on their own, or we will remove them at your first check-up.    Check your wound daily for signs of infection, such as increased redness, severe swelling or draining. Fever may also be a sign of infection. Call us if you see any of these signs.    If there are no signs of infection, you may shower after the bandage comes off in 3 days. If you take a tub bath, keep the wound dry.    No soaking the incision (swimming pool, bathtub, hot tub) for 2 weeks.    You may have mild to medium pain for 3 to 5 days. Take Acetaminophen (Tylenol) or Ibuprofen (Advil) for the pain. If the pain persists or is severe, call us.    Activity    Chest:    For at least 3 weeks: Do not raise your right elbow above your shoulder. You can begin to use your arm as it feels comfortable to you.    Do not use arm on implant side to lift more than 10 pounds for 2 weeks.    In 6 to 8 weeks: You may begin to golf, play tennis, swim and do similar activities.    No driving for one week. Please talk to your doctor for specific recommendations.      Bleeding:    Chest:    If you start bleeding from the incision site, sit down and press firmly on the site for 10  minutes.     Once bleeding stops, call [facility] as soon as you can.       Call 911 right away if you have heavy bleeding or bleeding that does not stop.      Medicines:      STOP metoprolol and Diltiazem    Continue all other medications    I sent in a small Rx for trazadone 25 mg daily at night before bedtime for sleep. Only 10 tabs (20 doses) to get you through until you see Dr. العلي again.      If you have pain or shortness of breath, you may take Advil (ibuprofen) or Tylenol (acetaminophen).    Follow Up Appointments:      Follow up with Device Clinic at [facility] of patient preference in 7-10 days.    Follow up with Dr. العلي to discuss use of trazadone. Chest X-Ray was done in hospital after the pacemaker, so I don't think you'll need the repeat one ordered by Dr. العلي.  It appears that Dr. العلي's office has made contact with Speech to arrange a swallow study. Contact his office if you haven't heard anything in the next 48 hours.    Call the clinic if:      You have increased pain or a large or growing hard lump around the site.    The site is red, swollen, hot or tender.    Blood or fluid is draining from the site.    You have chills or a fever greater than 101 F (38 C).    Your leg feels numb, cool or changes color.    Increased pain in the chest and    Increased shortness of breath    Chest pain not relieved by Tylenol or Advil    New pain in the back or belly that you cannot control with Tylenol.    Recurrent irregular or fast heart rate lasting over 2 hours.    Any questions or concerns.    Heart rhythms:    You may have some irregular heartbeats. These feel very strong. They may make you feel that the fast heart rhythm is going to start again.  Give it time. The irregular beats should occur less often.      Telling others about your device:      Before you leave the hospital, you will receive a temporary ID card. A permanent card will be mailed to you about 6 to 8 weeks later.  Always carry the ID card with you. It has important details about your device.    You may also get a Medical Alert bracelet or tag that says you have a pacemaker or a defibrillator (ICD). Go to www.[email or web address].     Always tell doctors, dentists and other care providers that you have a device implanted in you.    Let us know before you plan any surgeries. Your care team must take special steps to keep you safe during certain procedures. These steps will depend on the type of device you have. Your provider will need to see your ID card. They may need to call us for instructions.    Device Safety:      Please refer to device  s booklet for further information.    [facility] [place]:    [phone] UMP (7 days a week)

## 2019-02-13 NOTE — PROGRESS NOTES
1020: Pt returned from EP Lab. Pt sleepy from sedation. Right subclavian pacer site dry and intact. No oozing or hematoma noted. Area soft & flat. Pt and DTR instructed on activity restrictions while on bedrest. Verbal understanding received from pt.  Pt instructed on activity restrictions with R arm. Verbal understanding received. Pt denies pain. Pt's family at bedside. Detailed update given.  ICD/PPM booklet & ID card given to pt's DTR.    1320:  OOB - steady on feet. Ambulated in halls to bathroom with good bria. No change in puncture site assessment with activity. Detailed report called to VEDA Dempsey in OBS. Pt transferred to OBS rm 18. All personal belongings sent w/ pt.

## 2019-02-14 ENCOUNTER — APPOINTMENT (OUTPATIENT)
Dept: GENERAL RADIOLOGY | Facility: CLINIC | Age: 84
End: 2019-02-14
Attending: INTERNAL MEDICINE
Payer: COMMERCIAL

## 2019-02-14 VITALS
TEMPERATURE: 97.6 F | WEIGHT: 136.2 LBS | HEART RATE: 82 BPM | DIASTOLIC BLOOD PRESSURE: 81 MMHG | HEIGHT: 74 IN | BODY MASS INDEX: 17.48 KG/M2 | OXYGEN SATURATION: 100 % | SYSTOLIC BLOOD PRESSURE: 133 MMHG | RESPIRATION RATE: 16 BRPM

## 2019-02-14 DIAGNOSIS — Z95.0 CARDIAC PACEMAKER IN SITU: Primary | ICD-10-CM

## 2019-02-14 LAB — INTERPRETATION ECG - MUSE: NORMAL

## 2019-02-14 PROCEDURE — 40000934 ZZH STATISTIC OUTPATIENT (NON-OBS) DAY

## 2019-02-14 PROCEDURE — 40000986 XR CHEST 2 VW

## 2019-02-14 PROCEDURE — 25000132 ZZH RX MED GY IP 250 OP 250 PS 637: Performed by: INTERNAL MEDICINE

## 2019-02-14 PROCEDURE — 99214 OFFICE O/P EST MOD 30 MIN: CPT | Performed by: PHYSICIAN ASSISTANT

## 2019-02-14 RX ORDER — TRAZODONE HYDROCHLORIDE 50 MG/1
25 TABLET, FILM COATED ORAL AT BEDTIME
Qty: 10 TABLET | Refills: 0 | Status: SHIPPED | OUTPATIENT
Start: 2019-02-14 | End: 2019-03-06

## 2019-02-14 RX ADMIN — APIXABAN 5 MG: 5 TABLET, FILM COATED ORAL at 08:37

## 2019-02-14 ASSESSMENT — MIFFLIN-ST. JEOR: SCORE: 1392.55

## 2019-02-14 NOTE — PLAN OF CARE
Alert to self, forgetful. VSS on RA. A+1. Pacemaker site WNL w/ scant dried drainage. CMS intact. Refused tele overnight, no IV - MD aware and ok, pt to discharge today. Denies pain, n/t, dizziness, nausea. Voiding adequately. Daughter Velma @ bedside, transportation service to discharge pt @ 1115. AVS and discharge meds explained to pt, all questions answered. CXR CD given to pt per pt request. Belongings taken w/ at discharge. Pt left via transportation service to home.

## 2019-02-14 NOTE — PLAN OF CARE
Speech Language Therapy Discharge Summary    Reason for therapy discharge:    Discharged to home with home therapy.    Progress towards therapy goal(s). See goals on Care Plan in [facility] electronic health record for goal details.  Goals partially met.  Barriers to achieving goals:   discharge from facility.    Therapy recommendation(s):    Continued therapy is recommended.  Rationale/Recommendations:  Continue with ST skilled intervention for diet tolerance of a DDL 3 with nectar. OP video to be considered in 2-4 weeks pending progress. .

## 2019-02-14 NOTE — PROGRESS NOTES
Alert to self. VSS on RA. Pacemaker incision site w/ scant dried drainage. CMS intact. Pt refused tele/IV-MD aware and ok since discharge is today. Denies pain. Adequate UOP. Pt's daughter at bedside-very helpful. CXR @ 0630. Will cont to monitor.

## 2019-02-14 NOTE — PROGRESS NOTES
MD Notification    Notified Person: MD    Notified Person Name: Dr. STEPH Osorio    Notification Date/Time: 2/13/19 @ 2250hrs.    Notification Interaction: Phone    Purpose of Notification: Pt does not want tele on him and he also removed IV.     Orders Received: Pt can be off Telemetry and no need of IV tonight.     Comments:

## 2019-02-14 NOTE — PLAN OF CARE
Alert to self. VSS on RA. Pacemaker incision site w/ scant dried drainage. Tele: 90% V-paced. Pt agitated, removed tele and IV, MD aware.Denies pain. Adequate UOP. Pt's daughter is in the room.

## 2019-02-14 NOTE — PROGRESS NOTES
"[facility]    EP Progress Note    Date of Service (when I saw the patient): 02/14/2019     Assessment & Plan   Paul Milligan is a 83 year old male with h/o dementia and persistent atrial fibrillation.  HR quite difficult to control and met with Dr. Che 2/7 to discuss options including AV Node ablation and PPM implant.  He was therefore admitted on 2/13/2019 and underwent AVN ablation and placement of single chamber [employer] PPM with Dr. Che.    1. Persistent AFib with EF 45-50% -   * Noted at time of hospitalization 12/2018. Follow-up ZioPatch 1/2019 showed AFib with an average of 120 but range  bpm despite  Diltiazem 120 mgand metoprolol tartrate 25 mg BID  * Echo with EF 45-50% 12/2018    * Now s/p AV Node ablation via R axillary vein. Dilated RA, making catheter contact quite challenging. 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 Che's note 2/7. Can add back if needed for HTN   * Resume Eliquis for CHADSVASc 3 (CM, age)     2. Mild CM -  * EF 45-50%  * PTA on no diuretic therapy     PLAN:   * Continue to watch for fluid overload    3. Insomnia -   * Met with Dr. العلي last week and briefly discussed sleep. With all of Paul's medical issues, plan was to Follow-up after PPM placed.     PLAN:   * Low dose Trazadone (25 mg) Rx'd with small supply (10 tabs = 20 days) until he gets back in to Dr. العلي.    Munira Bell PA-C    Interval History   Feeling \"fine.\" Daughter at bedside    Physical Exam   Temp: 97.6  F (36.4  C) Temp src: Oral BP: 108/66 Pulse: 77   Resp: 16 SpO2: 99 % O2 Device: None (Room air) Oxygen " Delivery: 2 LPM  Vitals:    02/13/19 0654 02/14/19 0500   Weight: 62.2 kg (137 lb 3.2 oz) 61.8 kg (136 lb 3.2 oz)     Vital Signs with Ranges  Temp:  [95.3  F (35.2  C)-97.6  F (36.4  C)] 97.6  F (36.4  C)  Pulse:  [70-77] 77  Resp:  [16-18] 16  BP: (107-139)/(66-86) 108/66  SpO2:  [97 %-100 %] 99 %  I/O last 3 completed shifts:  In: 240 [P.O.:240]  Out: 500 [Urine:500]    Telemetry:  70 bpm    Constitutional: awake, alert, cooperative, no apparent distress, and appears stated age  Respiratory: CTA B  Cardiovascular: R sided PPM site c/d. Regular  Musculoskeletal: DPs 1+. No edema    Medications       apixaban ANTICOAGULANT  5 mg Oral BID     diltiazem ER  120 mg Oral QAM     melatonin  6 mg Oral At Bedtime     metoprolol tartrate  25 mg Oral BID     sodium chloride (PF)  3 mL Intracatheter Q8H     vitamin C  500 mg Oral Daily       Data   I personally reviewed no EKGs today    Results for orders placed or performed during the hospital encounter of 02/13/19 (from the past 24 hour(s))   Basic metabolic panel   Result Value Ref Range    Sodium 136 133 - 144 mmol/L    Potassium 4.1 3.4 - 5.3 mmol/L    Chloride 102 94 - 109 mmol/L    Carbon Dioxide 29 20 - 32 mmol/L    Anion Gap 5 3 - 14 mmol/L    Glucose 80 70 - 99 mg/dL    Urea Nitrogen 12 7 - 30 mg/dL    Creatinine 1.06 0.66 - 1.25 mg/dL    GFR Estimate 65 >60 mL/min/[1.73_m2]    GFR Estimate If Black 76 >60 mL/min/[1.73_m2]    Calcium 9.2 8.5 - 10.1 mg/dL   CBC with platelets   Result Value Ref Range    WBC 5.2 4.0 - 11.0 10e9/L    RBC Count 4.09 (L) 4.4 - 5.9 10e12/L    Hemoglobin 13.6 13.3 - 17.7 g/dL    Hematocrit 40.1 40.0 - 53.0 %    MCV 98 78 - 100 fl    MCH 33.3 (H) 26.5 - 33.0 pg    MCHC 33.9 31.5 - 36.5 g/dL    RDW 14.7 10.0 - 15.0 %    Platelet Count 251 150 - 450 10e9/L   EKG 12-lead, tracing only   Result Value Ref Range    Interpretation ECG Click View Image link to view waveform and result    EP Device    Narrative    PROCEDURES PERFORMED:   1.  Ablation of atrioventricular node (AVN)  2. Implantation of a chamber-chamber pacemaker  3. Conscious sedation.   4. Cardiac fluoroscopy    INDICATION: permanent afib    HISTORY OF PRESENT ILLNESS: This is a 83 year old year-old patient with a   history of medical refractory AFib who is referred for AVN ablation and   pacemaker implantation. Patient is left handed and has dementia. Both AVN   ablation and device implantation will be done on the right side.    METHOD: I determined this patient to be an appropriate candidate for the   planned sedation and procedure and have reassessed the patient immediately   prior to sedation and procedure. The patient was prepped and draped in the   usual manner. The procedure was performed under conscious sedation for 66   minutes. 2 mg of Versed and 50 mcg of Fentanyl were used. Heart rate, BP,   respiration, oxygen saturation, and patient responses were monitored   throughout the procedure with the RN assistance. Intravenous antibiotic   was given. 1% lidocaine was infiltrated into the right axillary vein area.   This vein was access using the modified Seldinger's technique and   ultrasound guidance times two. An incision was then made with a #15 blade.   A peelable sheath was then glide over the wire into the vein. A lead was   then advanced into the heart and was fixed into the right ventricular   septal area. Appropriate sensing and threshold were obtained. There was no   diaphragmatic stimulation with high output pacing. The sheath was then   peeled away. The lead was secured to the pectoralis muscle fascia with   O-Ethibond sutures. A pocket was then fashioned and was irrigated with   bacitracin solution. The lead was attached to the device and placed in the   pocket.     A [place] sheath was placed in the right axillary vein. A large curve 5 mm   ablation catheter was then advanced into the heart. Ablation was then   performed using an EPT generator. Both catheter and  sheath were then   removed. The pocket was then closed with 2-0 and 4-0 Vicryl sutures.   Steri-Strips and an OpSite dressing were then placed over the incision.The   patient was then transferred back to the room in stable condition.     ABLATION SUMMARY: Approximately 20 applications of radiofrequency ablation   were targeted at the AV node at 50 [place] and 60 degrees Celsius for 10-60   seconds after mapping of the AVN was done. Complete heart block was   achieved with a junctional escape rate of 40 beats per minute. The right   atrium was extremely dilated. Catheter contact was quite challenging.    DEVICE INFORMATION:  [employer], L310, [id number]  RV - BSC, 7741, [id number]    MEASURED DATA  RV - 7.1 mV, 0.6V@0.4 ms, 850 ohms    COMPLICATIONS: None.    Fluoroscopy (minutes): 20    CONCLUSION:  1. Uneventful ablation of AV node and implantation of a device pacemaker.    Eder Che MD       Glucose by meter   Result Value Ref Range    Glucose 93 70 - 99 mg/dL

## 2019-02-14 NOTE — PROGRESS NOTES
Device Discharge  Dressing:  Clean, dry, and intact  Chest XR reviewed:  Yes  Pneumo present?:  NO  Lead Measurements per Device Rep:  WNL; 99% . Underlying rhythm is afib with vent rate 40s    Education Provided:  Avoid raising right arm above the level of the shoulder for 3 weeks.  Do not shower until outer occlusive dressing has been removed.  Remove outer dressing in 3 - 4 days.  Leave steri-strips in place, these will be removed at the 1 week check. 2/21 at 1 pm  Call Device Clinic with increased swelling, drainage, or fever > 101 degrees.   Follow-up with the Device Clinic as scheduled.

## 2019-02-19 ENCOUNTER — TELEPHONE (OUTPATIENT)
Dept: CARDIOLOGY | Facility: CLINIC | Age: 84
End: 2019-02-19

## 2019-02-19 RX ORDER — DILTIAZEM HYDROCHLORIDE 120 MG/1
120 CAPSULE, EXTENDED RELEASE ORAL DAILY
Qty: 1 CAPSULE | Refills: 3 | COMMUNITY
Start: 2019-02-19 | End: 2019-02-26

## 2019-02-19 NOTE — TELEPHONE ENCOUNTER
Daughter calling in per Dr Orozco's recommendation. Dr Orozco was on call last night when daughter called to reports patients blood pressure was elevated to 172/103 last night. Dr Orozco told her to give him a dose of Diltiazem. Patient has had a recent AVNA with PPM implant on 2/13/19 and was instructed to stop his CCB and BB.   Daughter reports B/P this am at 147/82, HR at 72. I will review with Dr Che. VEDA Grossman

## 2019-02-19 NOTE — TELEPHONE ENCOUNTER
Called and LM for daughter regarding restarting the Diltiazem. Asked her to give a few days, take B/P once per day and call us in a few days with an update. Instructed her to call back with any questions or concerns . VEDA Grossman

## 2019-02-19 NOTE — TELEPHONE ENCOUNTER
It was too extreme to stop his meds post ablation. He should go back to his diltiazem 120 mg sr daily. If bp is still >140 he could increase to bid. Thanks, qp

## 2019-02-21 ENCOUNTER — ANCILLARY PROCEDURE (OUTPATIENT)
Dept: CARDIOLOGY | Facility: CLINIC | Age: 84
End: 2019-02-21
Attending: INTERNAL MEDICINE
Payer: COMMERCIAL

## 2019-02-21 ENCOUNTER — DOCUMENTATION ONLY (OUTPATIENT)
Dept: CARDIOLOGY | Facility: CLINIC | Age: 84
End: 2019-02-21

## 2019-02-21 DIAGNOSIS — Z95.0 CARDIAC PACEMAKER IN SITU: ICD-10-CM

## 2019-02-21 PROCEDURE — 93279 PRGRMG DEV EVAL PM/LDLS PM: CPT | Performed by: INTERNAL MEDICINE

## 2019-02-21 NOTE — TELEPHONE ENCOUNTER
"Paul came in for his 7 day check s/p AVNA:    \"ABLATION SUMMARY: Approximately 20 applications of radiofrequency ablation were targeted at the AV node at 50 [place] and 60 degrees Celsius for 10-60 seconds after mapping of the AVN was done. Complete heart block was achieved with a junctional escape rate of 40 beats per minute. The right atrium was extremely dilated. Catheter contact was quite challenging.\"      He is pacing 94% of the time, but has his own intrinsic VS beats in the 70s-80s. Histogram shows minimal beats in the 90 range.     Do you want to just keep an eye on this? His 6 week check is 4/4.   "

## 2019-02-25 ENCOUNTER — MEDICAL CORRESPONDENCE (OUTPATIENT)
Dept: HEALTH INFORMATION MANAGEMENT | Facility: CLINIC | Age: 84
End: 2019-02-25

## 2019-02-25 DIAGNOSIS — Z53.9 DIAGNOSIS NOT YET DEFINED: Primary | ICD-10-CM

## 2019-02-25 DIAGNOSIS — I48.20 CHRONIC ATRIAL FIBRILLATION (H): Primary | ICD-10-CM

## 2019-02-25 PROCEDURE — G0180 MD CERTIFICATION HHA PATIENT: HCPCS | Performed by: INTERNAL MEDICINE

## 2019-02-26 DIAGNOSIS — I48.91 ATRIAL FIBRILLATION (H): Primary | ICD-10-CM

## 2019-02-26 RX ORDER — DILTIAZEM HYDROCHLORIDE 120 MG/1
120 CAPSULE, EXTENDED RELEASE ORAL DAILY
Qty: 90 CAPSULE | Refills: 3 | Status: SHIPPED | OUTPATIENT
Start: 2019-02-26

## 2019-02-26 RX ORDER — APIXABAN 5 MG/1
TABLET, FILM COATED ORAL
Qty: 60 TABLET | Refills: 0 | Status: SHIPPED | OUTPATIENT
Start: 2019-02-26 | End: 2019-03-06

## 2019-02-26 NOTE — TELEPHONE ENCOUNTER
Daughter Velma calling in today to update us on her dad - he sounds to be mostly doing well. Occasional high blood pressure, seems to be related to when he is disoriented. She is still giving dilt once a day, will keep a log of evening BP and let us know if it is consistently higher than 140 and will move to twice daily dilt. Refilled dilt and eliquis for her.

## 2019-03-01 ENCOUNTER — MEDICAL CORRESPONDENCE (OUTPATIENT)
Dept: HEALTH INFORMATION MANAGEMENT | Facility: CLINIC | Age: 84
End: 2019-03-01

## 2019-03-01 NOTE — TELEPHONE ENCOUNTER
Velma called back this morning to update us on Paul's BP - he has been running about 140 systolic in the AM and up in the 150s in the PM. She did start him back on twice a day dosing yesterday and his blood pressure this morning was 132/86. She states he looks like he feels better too.     We just filled a 90 day supply of the once a day dosing a few days ago - asked her to finish this and when he has about a week left let us know and we'll send a prescription for BID dosing. SK

## 2019-03-04 ENCOUNTER — TELEPHONE (OUTPATIENT)
Dept: CARDIOLOGY | Facility: CLINIC | Age: 84
End: 2019-03-04

## 2019-03-04 NOTE — TELEPHONE ENCOUNTER
"Call from Velma, (dgt- 19\" phone call).     Calling to report that she is very concerned re: her father because of confusion to time, date, person. Diltiazem 120mg bid for hypertension.  She believes the confusion is due to the diltiazem. She was very detailed in describing episodes over the past week; Last dose of diltiazem was 3-2-19 @ 1915. Daughter states by yesterday AM (3-3-19) she had noted improvement in his mentation.     BPs have been in the 150's/upper 90's.    The Metoprolol and Diltiazem had been discontinued after implantation. Diltiazem 120mg every day restarted per Dr Orozco after daughter called the on call MD. This was increased to bid on 3-1-19. He no longer takes the Metoprolol.    She is instructed to monitor the BP twice daily.  I will route to Dr Che to advise    SueLangenbrunnerRN  "

## 2019-03-05 ENCOUNTER — MEDICAL CORRESPONDENCE (OUTPATIENT)
Dept: HEALTH INFORMATION MANAGEMENT | Facility: CLINIC | Age: 84
End: 2019-03-05

## 2019-03-06 ENCOUNTER — MEDICAL CORRESPONDENCE (OUTPATIENT)
Dept: HEALTH INFORMATION MANAGEMENT | Facility: CLINIC | Age: 84
End: 2019-03-06

## 2019-03-06 ENCOUNTER — APPOINTMENT (OUTPATIENT)
Dept: CT IMAGING | Facility: CLINIC | Age: 84
End: 2019-03-06
Attending: EMERGENCY MEDICINE
Payer: COMMERCIAL

## 2019-03-06 ENCOUNTER — APPOINTMENT (OUTPATIENT)
Dept: GENERAL RADIOLOGY | Facility: CLINIC | Age: 84
End: 2019-03-06
Attending: EMERGENCY MEDICINE
Payer: COMMERCIAL

## 2019-03-06 ENCOUNTER — HOSPITAL ENCOUNTER (OUTPATIENT)
Facility: CLINIC | Age: 84
Setting detail: OBSERVATION
Discharge: NURSING, REHAB OR LONG-TERM CARE | End: 2019-03-12
Attending: EMERGENCY MEDICINE | Admitting: INTERNAL MEDICINE
Payer: COMMERCIAL

## 2019-03-06 DIAGNOSIS — R33.9 URINARY RETENTION: Primary | ICD-10-CM

## 2019-03-06 DIAGNOSIS — F03.91 DEMENTIA WITH BEHAVIORAL DISTURBANCE, UNSPECIFIED DEMENTIA TYPE: ICD-10-CM

## 2019-03-06 DIAGNOSIS — R41.82 ALTERED MENTAL STATUS, UNSPECIFIED ALTERED MENTAL STATUS TYPE: ICD-10-CM

## 2019-03-06 PROBLEM — F03.918 DEMENTIA WITH BEHAVIORAL DISTURBANCE (H): Status: ACTIVE | Noted: 2019-03-06

## 2019-03-06 LAB
ALBUMIN UR-MCNC: NEGATIVE MG/DL
AMORPH CRY #/AREA URNS HPF: ABNORMAL /HPF
ANION GAP SERPL CALCULATED.3IONS-SCNC: 7 MMOL/L (ref 3–14)
APPEARANCE UR: ABNORMAL
BASOPHILS # BLD AUTO: 0 10E9/L (ref 0–0.2)
BASOPHILS NFR BLD AUTO: 0.3 %
BILIRUB UR QL STRIP: NEGATIVE
BUN SERPL-MCNC: 15 MG/DL (ref 7–30)
CALCIUM SERPL-MCNC: 9.1 MG/DL (ref 8.5–10.1)
CHLORIDE SERPL-SCNC: 100 MMOL/L (ref 94–109)
CO2 SERPL-SCNC: 27 MMOL/L (ref 20–32)
COLOR UR AUTO: YELLOW
CREAT SERPL-MCNC: 0.99 MG/DL (ref 0.66–1.25)
DIFFERENTIAL METHOD BLD: ABNORMAL
EOSINOPHIL # BLD AUTO: 0 10E9/L (ref 0–0.7)
EOSINOPHIL NFR BLD AUTO: 0.3 %
ERYTHROCYTE [DISTWIDTH] IN BLOOD BY AUTOMATED COUNT: 14.5 % (ref 10–15)
GFR SERPL CREATININE-BSD FRML MDRD: 71 ML/MIN/{1.73_M2}
GLUCOSE SERPL-MCNC: 99 MG/DL (ref 70–99)
GLUCOSE UR STRIP-MCNC: NEGATIVE MG/DL
HCT VFR BLD AUTO: 39.6 % (ref 40–53)
HGB BLD-MCNC: 13.4 G/DL (ref 13.3–17.7)
HGB UR QL STRIP: NEGATIVE
IMM GRANULOCYTES # BLD: 0 10E9/L (ref 0–0.4)
IMM GRANULOCYTES NFR BLD: 0.2 %
INTERPRETATION ECG - MUSE: NORMAL
KETONES UR STRIP-MCNC: NEGATIVE MG/DL
LEUKOCYTE ESTERASE UR QL STRIP: NEGATIVE
LYMPHOCYTES # BLD AUTO: 1.5 10E9/L (ref 0.8–5.3)
LYMPHOCYTES NFR BLD AUTO: 24.4 %
MCH RBC QN AUTO: 32.8 PG (ref 26.5–33)
MCHC RBC AUTO-ENTMCNC: 33.8 G/DL (ref 31.5–36.5)
MCV RBC AUTO: 97 FL (ref 78–100)
MONOCYTES # BLD AUTO: 0.7 10E9/L (ref 0–1.3)
MONOCYTES NFR BLD AUTO: 11.9 %
MUCOUS THREADS #/AREA URNS LPF: PRESENT /LPF
NEUTROPHILS # BLD AUTO: 3.9 10E9/L (ref 1.6–8.3)
NEUTROPHILS NFR BLD AUTO: 62.9 %
NITRATE UR QL: NEGATIVE
NRBC # BLD AUTO: 0 10*3/UL
NRBC BLD AUTO-RTO: 0 /100
PH UR STRIP: 7 PH (ref 5–7)
PLATELET # BLD AUTO: 290 10E9/L (ref 150–450)
POTASSIUM SERPL-SCNC: 4.5 MMOL/L (ref 3.4–5.3)
RBC # BLD AUTO: 4.08 10E12/L (ref 4.4–5.9)
RBC #/AREA URNS AUTO: 0 /HPF (ref 0–2)
SODIUM SERPL-SCNC: 134 MMOL/L (ref 133–144)
SOURCE: ABNORMAL
SP GR UR STRIP: 1.01 (ref 1–1.03)
UROBILINOGEN UR STRIP-MCNC: NORMAL MG/DL (ref 0–2)
WBC # BLD AUTO: 6.2 10E9/L (ref 4–11)
WBC #/AREA URNS AUTO: 0 /HPF (ref 0–5)

## 2019-03-06 PROCEDURE — 25000128 H RX IP 250 OP 636: Performed by: EMERGENCY MEDICINE

## 2019-03-06 PROCEDURE — 80048 BASIC METABOLIC PNL TOTAL CA: CPT | Performed by: EMERGENCY MEDICINE

## 2019-03-06 PROCEDURE — 96374 THER/PROPH/DIAG INJ IV PUSH: CPT

## 2019-03-06 PROCEDURE — 70450 CT HEAD/BRAIN W/O DYE: CPT

## 2019-03-06 PROCEDURE — 81001 URINALYSIS AUTO W/SCOPE: CPT | Performed by: EMERGENCY MEDICINE

## 2019-03-06 PROCEDURE — 25000132 ZZH RX MED GY IP 250 OP 250 PS 637: Performed by: EMERGENCY MEDICINE

## 2019-03-06 PROCEDURE — 99220 ZZC INITIAL OBSERVATION CARE,LEVL III: CPT | Performed by: PHYSICIAN ASSISTANT

## 2019-03-06 PROCEDURE — 25000132 ZZH RX MED GY IP 250 OP 250 PS 637: Performed by: PHYSICIAN ASSISTANT

## 2019-03-06 PROCEDURE — G0378 HOSPITAL OBSERVATION PER HR: HCPCS

## 2019-03-06 PROCEDURE — 85025 COMPLETE CBC W/AUTO DIFF WBC: CPT | Performed by: EMERGENCY MEDICINE

## 2019-03-06 PROCEDURE — 93005 ELECTROCARDIOGRAM TRACING: CPT

## 2019-03-06 PROCEDURE — 99285 EMERGENCY DEPT VISIT HI MDM: CPT | Mod: 25

## 2019-03-06 PROCEDURE — 71046 X-RAY EXAM CHEST 2 VIEWS: CPT

## 2019-03-06 RX ORDER — ZINC GLUCONATE 50 MG
50 TABLET ORAL DAILY
COMMUNITY

## 2019-03-06 RX ORDER — AMOXICILLIN 250 MG
1 CAPSULE ORAL 2 TIMES DAILY PRN
Status: DISCONTINUED | OUTPATIENT
Start: 2019-03-06 | End: 2019-03-12 | Stop reason: HOSPADM

## 2019-03-06 RX ORDER — MULTIVIT-MIN/IRON/FOLIC ACID/K 18-600-40
1 CAPSULE ORAL DAILY
COMMUNITY

## 2019-03-06 RX ORDER — ASCORBIC ACID 500 MG
500 TABLET ORAL DAILY
COMMUNITY

## 2019-03-06 RX ORDER — LANOLIN ALCOHOL/MO/W.PET/CERES
6 CREAM (GRAM) TOPICAL AT BEDTIME
COMMUNITY

## 2019-03-06 RX ORDER — ACETAMINOPHEN 650 MG/1
650 SUPPOSITORY RECTAL EVERY 4 HOURS PRN
Status: DISCONTINUED | OUTPATIENT
Start: 2019-03-06 | End: 2019-03-12 | Stop reason: HOSPADM

## 2019-03-06 RX ORDER — LORAZEPAM 0.5 MG/1
.25-.5 TABLET ORAL DAILY PRN
Status: ON HOLD | COMMUNITY
End: 2019-03-12

## 2019-03-06 RX ORDER — AMOXICILLIN 250 MG
2 CAPSULE ORAL 2 TIMES DAILY PRN
Status: DISCONTINUED | OUTPATIENT
Start: 2019-03-06 | End: 2019-03-12 | Stop reason: HOSPADM

## 2019-03-06 RX ORDER — ACETAMINOPHEN 325 MG/1
650 TABLET ORAL EVERY 4 HOURS PRN
Status: DISCONTINUED | OUTPATIENT
Start: 2019-03-06 | End: 2019-03-12 | Stop reason: HOSPADM

## 2019-03-06 RX ORDER — OLANZAPINE 5 MG/1
5 TABLET, ORALLY DISINTEGRATING ORAL
Status: DISCONTINUED | OUTPATIENT
Start: 2019-03-06 | End: 2019-03-07

## 2019-03-06 RX ORDER — ONDANSETRON 4 MG/1
4 TABLET, ORALLY DISINTEGRATING ORAL EVERY 6 HOURS PRN
Status: DISCONTINUED | OUTPATIENT
Start: 2019-03-06 | End: 2019-03-12 | Stop reason: HOSPADM

## 2019-03-06 RX ORDER — ONDANSETRON 2 MG/ML
4 INJECTION INTRAMUSCULAR; INTRAVENOUS EVERY 6 HOURS PRN
Status: DISCONTINUED | OUTPATIENT
Start: 2019-03-06 | End: 2019-03-12 | Stop reason: HOSPADM

## 2019-03-06 RX ORDER — NALOXONE HYDROCHLORIDE 0.4 MG/ML
.1-.4 INJECTION, SOLUTION INTRAMUSCULAR; INTRAVENOUS; SUBCUTANEOUS
Status: DISCONTINUED | OUTPATIENT
Start: 2019-03-06 | End: 2019-03-12 | Stop reason: HOSPADM

## 2019-03-06 RX ORDER — LORAZEPAM 2 MG/ML
0.5 INJECTION INTRAMUSCULAR ONCE
Status: COMPLETED | OUTPATIENT
Start: 2019-03-06 | End: 2019-03-06

## 2019-03-06 RX ORDER — LANOLIN ALCOHOL/MO/W.PET/CERES
6 CREAM (GRAM) TOPICAL AT BEDTIME
Status: DISCONTINUED | OUTPATIENT
Start: 2019-03-06 | End: 2019-03-12 | Stop reason: HOSPADM

## 2019-03-06 RX ORDER — CHLORAL HYDRATE 500 MG
1 CAPSULE ORAL DAILY
COMMUNITY

## 2019-03-06 RX ORDER — LORAZEPAM 2 MG/ML
0.5 INJECTION INTRAMUSCULAR
Status: DISCONTINUED | OUTPATIENT
Start: 2019-03-06 | End: 2019-03-06

## 2019-03-06 RX ORDER — OLANZAPINE 5 MG/1
5 TABLET, ORALLY DISINTEGRATING ORAL ONCE
Status: COMPLETED | OUTPATIENT
Start: 2019-03-06 | End: 2019-03-06

## 2019-03-06 RX ORDER — HYDRALAZINE HYDROCHLORIDE 20 MG/ML
10 INJECTION INTRAMUSCULAR; INTRAVENOUS EVERY 4 HOURS PRN
Status: DISCONTINUED | OUTPATIENT
Start: 2019-03-06 | End: 2019-03-12 | Stop reason: HOSPADM

## 2019-03-06 RX ADMIN — APIXABAN 5 MG: 5 TABLET, FILM COATED ORAL at 20:56

## 2019-03-06 RX ADMIN — LORAZEPAM 0.5 MG: 2 INJECTION INTRAMUSCULAR; INTRAVENOUS at 14:12

## 2019-03-06 RX ADMIN — DEXTRAN 70, AND HYPROMELLOSE 2910 1 DROP: 1; 3 SOLUTION/ DROPS OPHTHALMIC at 20:57

## 2019-03-06 RX ADMIN — OLANZAPINE 5 MG: 5 TABLET, ORALLY DISINTEGRATING ORAL at 14:39

## 2019-03-06 RX ADMIN — MELATONIN TAB 3 MG 6 MG: 3 TAB at 20:56

## 2019-03-06 ASSESSMENT — ENCOUNTER SYMPTOMS
FEVER: 0
CONFUSION: 1
FREQUENCY: 1
NAUSEA: 0
VOMITING: 0
DIARRHEA: 1
ABDOMINAL PAIN: 0

## 2019-03-06 NOTE — TELEPHONE ENCOUNTER
Received another two long voicemails from Velma basically reiterating what she's said on the previous messages about high blood pressures. She mentioned that Paul will be going to the ED to be worked up for an infection. SK

## 2019-03-06 NOTE — TELEPHONE ENCOUNTER
There is no consent to communicate on file for Velma Trujillo. Called home number on file. Pt's spouse answered- writer asked to speak with patient to receive verbal permission to speak with Dtr- Rec'd     S: Concerns for underlying dementia/sundowning/ Blood pressure concerns     Disorientation/confusion in AM/PM- last evening went into significant sundowning with elopement risks. Difficult to redirect. Unsure of time of day, etc. Symptoms starting at 1715.     Symptoms worsened after adding additional dose of 120 mg this weekend.     Blood pressure concerns:     3/3- stopped diltiazem- best nights sleep with pressures OK    Some blood pressure readings: 142/97, 152/97, 150/96    1100 3/4- 128/94    0900 3/5- 139/94  1245 3/5 145/86  1900 3/5 171/114 **upset,   (Diltiazem given- 1st dose since 3/3)    Last night was 12x's to go to the BR    Loose stools noted, no fevers, no pain    ADL's are going well, showering independently, dressing    Cognition and BP are the concerns     R: Advising clinic visit for UA/work-up. No appts available, PCP out of office and Velma does not believe that UC is the proper setting. If this is not possible d/t behaviors, call 911 and they may be able to help get patient to the hospital     Daughter agrees with plan.     Agatha KIM RN

## 2019-03-06 NOTE — ED PROVIDER NOTES
History     Chief Complaint:  Urinary frequency; confusion    History limited due to confusion and dementia and subsequently provided by daughter.    HPI   Paul Milligan is a 83 year old male with a history of chronic atrial fibrillation on Eliquis, recent pacemaker placed in February, and dementia at baseline who presents to the emergency department today via EMS for evaluation of urinary frequency. The patient's daughter reports the patient currently lives at a home with his wife and daughter. For the past month, he has been increasingly confused. He did have an aspiration pneumonia a couple of weeks ago and was on Levaquin at that time. 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 He has had trouble remembering that his daughter is his daughter, which is abnormal for him at baseline. Four days ago, his daughter stopped his diltiazem and called clinic who said this was fine, thinking that may be contributing to his increased confusion.  His confusion had been improving as well and seemed to be closer to baseline off the diltiazem. His blood pressure have been normal over the past four days as well until last night when they were noted to be 160s so last night, they started his diltiazem again. For the past day, he has had 12 episodes urinary frequency as well asn was up all night using the bathroom. He has also had loose stools over the past couple of days as well. They were concerned about his diarrhea and urinary frequency prompting a call to EMS and his arrival to the emergency department. En route, his blood pressure was 170s/120s. She denies nausea, vomiting, fever, and complaints of abdominal pain.    Allergies:  No Known Drug Allergies    Medications:    apixaban ANTICOAGULANT (ELIQUIS) 5 MG tablet  Ascorbic Acid (VITAMIN C PO)  diltiazem ER (DILT-XR) 120 MG 24 hr capsule  LORazepam (ATIVAN)  0.5 MG tablet  traZODone (DESYREL) 50 MG tablet     Past Medical History:    Chronic atrial fibrillation  Sick sinus syndrome  CHF  Dementia    Past Surgical History:    Perm Pacemaker  Orthopedic surgery    Family History:    History reviewed. No pertinent family history.     Social History:  The patient was accompanied to the ED by daughter.  Smoking Status: Never  Smokeless Tobacco: Never  Alcohol Use: No  Drug Use: No  Marital Status:      Review of Systems   Constitutional: Negative for fever.   Gastrointestinal: Positive for diarrhea. Negative for abdominal pain, nausea and vomiting.   Genitourinary: Positive for frequency.   Psychiatric/Behavioral: Positive for confusion.   All other systems reviewed and are negative.      Physical Exam     Patient Vitals for the past 24 hrs:   BP Temp Temp src Pulse Resp SpO2   03/06/19 1100 (!) 165/93 99.1  F (37.3  C) Oral 75 18 100 %         Physical Exam  General/Appearance: appears stated age, well-groomed, appears comfortable but irritable, confused, mildly agitated  Eyes: EOMI, no scleral injection, no icterus  ENT: MMM  Neck: supple, nl ROM, no stiffness  Cardiovascular: RRR, nl S1S2, no m/r/g, 2+ pulses in all 4 extremities, cap refill <2sec  Respiratory: CTAB, good air movement throughout, no wheezes/rhonchi/rales, no increased WOB, no retractions  Back: no lesions  GI: abd soft, non-distended, nttp,  no HSM, no rebound, no guarding, nl BS  MSK: FLANAGAN, good tone, no bony abnormality  Skin: warm and well-perfused, no rash, no edema, no ecchymosis, nl turgor  Neuro: GCS 15, alert, confused as to orientation/date/location/event, mildly agitated  Heme: no petechia, no purpura, no active bleeding    Emergency Department Course   ECG:  Indication: confusion  Completed at 1116.  Read at 1130.   Ventricular-paced rhythm  Abnormal ECG   Rate 70 bpm. AR interval *. QRS duration 158. QT/QTc 478/516. P-R-T axes *  -90  79.    Imaging:  Radiology findings were communicated  with the patient and family who voiced understanding of the findings.  XR Chest 2 views  IMPRESSION: No significant interval change. Single-lead cardiac device  right chest wall, with lead tip projected over the RV. Lungs clear. No  pneumothorax. No pleural effusions are identified, however the  posterior costophrenic angles are not entirely included on this exam.  Heart size appears stable. Pulmonary vascularity is within normal  limits. Calcified tortuous thoracic aorta.  Report per radiology     Head CT w/o Contrast  IMPRESSION:     1. No evidence of acute intracranial hemorrhage, mass, or herniation.  2. There is generalized atrophy of the brain. White matter changes are  present in the cerebral hemispheres that are consistent with small  vessel ischemic disease in this age patient.  Report per radiology     Laboratory:  Laboratory findings were communicated with the patient and family who voiced understanding of the findings.  CBC: WNL. (WBC 6.2, HGB 13.4, )   BMP: AWNL (Creatinine 0.99)  UA: slightly cloudy, yellow urine, mucous present, amorphous crystals few o/w WNL    Interventions:  1412 Ativan 0.5 mg IV    Emergency Department Course:  Nursing notes and vitals reviewed.  IV was inserted and blood was drawn for laboratory testing, results above.  The patient provided a urine sample here in the emergency department. This was sent for laboratory testing, findings above.  The patient was sent for a XR Chest 2 views and Head CT w/o Contrast while in the emergency department, results above.   1059: I performed an exam of the patient as documented above.   1145: Patient rechecked and updated.   1330: Patient rechecked and updated.   1422: I spoke with Dr. Jennings of the hospitalist service regarding patient's presentation, findings, and plan of care.  Findings and plan explained to the Patient and daughter who consents to admission. Discussed the patient with Dr. Jennings, who will admit the patient to a Obs  bed for further monitoring, evaluation, and treatment.  I personally reviewed the laboratory, imaging, and EKG results with the Patient and daughter and answered all related questions prior to admission.    Impression & Plan    Medical Decision Making:  This patient is an 83-year-old male with history of dementia who presents with increasing agitation and altered mental status.  There does not appear to be an infectious etiology.  He recently had a his diltiazem stopped which seemed to transiently improve his dementia however blood pressure increased so they had to restart it.  It is definitely possible that medications are making this worse.  Head CT was obtained which shows no bleed.  Ultimately this patient unfortunately no longer is safe at home as he is trying to leave the house when temperatures are very cold.  He will be coming into the hospitalist for further management and possible neuropsych workup.    Diagnosis:    ICD-10-CM    1. Altered mental status, unspecified altered mental status type R41.82        Disposition:  Admitted to [facility] bed with Dr. Dick Marquez Disclosure:  Bert CASAS, am serving as a scribe at 11:09 AM on 3/6/2019 to document services personally performed by Munira Buck MD based on my observations and the provider's statements to me.     3/6/2019    EMERGENCY DEPARTMENT       Munira Buck MD  03/06/19 8301

## 2019-03-06 NOTE — H&P
[facility]    History and Physical  Hospitalist       Date of Admission:  3/6/2019    Assessment & Plan   Paul Milligan is a 83 year old male with a past medical history significant for atrial fibrillation s/p recent ablation and pacemaker placement, cardiomyopathy, hypertension, history of aspiration pneumonia, and hypertension who presented to the Emergency Department with family for evaluation of confusion and urinary frequency.     Suspected dementia with behavioral disturbance. Patient with suspected dementia and decline overall since March 2018. Admitted here in December with atrial fibrillation and AMS and discharged to memory care TCU on scheduled Seroquel. Family reports experience was poor and felt patient was over-sedated even with low doses of Seroquel. He has not yet had formal Neuropsych testing or seen Neurology as he has significant anxiety attending clinic appointments. Currently lives with wife and daughter who report increased difficulty with managing patient as well as increased confusion over past week or so. WBC normal, patient afebrile with bland UA and CXR. No other infectious symptoms. Daughter concerned diltiazem may be affecting mental status we all. Family interested in discussion on placement, [facility]. Geripsych vs memory care TCU may be appropriate while awaiting long term placement.   --Psych consulted, appreciate assistance  --will order Neuropsych consult for formal testing though uncertain whether this will be available in the hospital   --zyprexa x1 given in ED and tolerated well. One additional prn dose available. Patient's family does not want to us Seroquel   --continue melatonin 6mg @hs, they report abdominal pain and diarrhea with trazodone use  --consider Remeron @hs, will discuss with Psychiatry tomorrow   --sitter tonight if needed   --SW consult for discharge planning     Urinary frequency. UA bland. ?retention  --bladder scan  and record PVR    Hypertension. Will stop PTA diltiazem as family concerned this is worsening mental status. Will watch BP off medications today and consider adding additional agent in the am. Prn hydralazine if needed     Paroxymal atrial fibrillation s/p ablation and pacemaker placement 2/13/19  EKG showed vpaced rhythm. Metoprolol and diltiazem stopped after procedure; diltiazem added back for elevated BP. Last ECHO 12/2018 with EF 45-50%, possibly tachycardia induced.   --continue PTA Eliquis     History of aspiration pneumonia. Followed by home SLP per daughter and on modified thickened liquid diet for a while. Now back on regular diet and daughter has not noticed any symptoms of aspiration recently. She declines speech consult here.   --aspiration precautions     DVT Prophylaxis: Ambulate every shift  Code Status: Full Code    Disposition: Expected discharge when safe dispo found, anticipate 2+ days     This patient was seen and examined with Dr. Jennings who agrees with the above plan.    Gypsy Guardado PA-C    Primary Care Physician   Ismael العلي    Chief Complaint   Confusion, urinary frequency     History is obtained primarily from the patient's daughter as well as chart review     History of Present Illness   Paul Milligan is a 83 year old male with a past medical history significant for atrial fibrillation s/p recent ablation and pacemaker placement, cardiomyopathy, hypertension, history of aspiration pneumonia, and hypertension who presented to the Emergency Department with family for evaluation of confusion and urinary frequency.  Patient's daughter provides most of the history due to the patient's dementia.  The patient currently resides at home with his wife and his daughter who is his primary caregiver.  The patient was admitted to [facility] back in December after presenting with altered mental status and new onset atrial fibrillation.  Prior to this visit the patient had  "not doctored in with the daughter believes was likely over 20 years.  During his admission he was followed by psychiatry and started on scheduled Seroquel and trazodone.  He was also evaluated by cardiology for his new onset atrial fibrillation which was managed medically with metoprolol and diltiazem, and anticoagulation.  The patient remained in the hospital for 7 days and ultimately discharged to [facility] TCU.  His daughter reports he had a poor experience at TCU and she felt that the patient was poorly tended to and over sedated with Seroquel.  They remain there for approximately 5 weeks before returning home.  The patient was having continued issues with rate control and ultimately underwent pacemaker placement on February 13.  After the procedure she was told to stop his metoprolol and diltiazem.  She reports she monitors his blood pressure at home and found it to be persistently elevated and after speaking to the cardiology clinic restarted the diltiazem.  She reports particularly over the past week or so the patient seems to be increasingly confused.  Her and her mother had wondered whether it may be related to the diltiazem and stopped this for a brief time during which his blood pressure became significantly elevated.  She also notes that last night he got up to use the bathroom 8 times and has not been sleeping very well.  Ultimately she was concerned he may have an infection.  She also notes that she herself has lost a significant amount of weight and has had difficulty caring for the patient 24/7.  The patient's wife and daughter are therefore interested in discussing possible placement options.  She is not believe that her father has had recent fevers or chills.  She has not noticed any cough or obvious shortness of breath.  She notes he has been sore since his procedure and occasionally has reported \"not feeling well \".  Patient is presently evaluated in the observation unit.  He has no complaints " at this time.  He denies any pain.  He denies nausea, vomiting, dizziness.  He is oriented to self only at the time of my exam.      Past Medical History    Past Medical History:   Diagnosis Date     Atrial fibrillation (H)      Dementia      Pacemaker        Past Surgical History   Past Surgical History:   Procedure Laterality Date     CARDIAC SURGERY       EP PERM PACER SINGLE LEAD N/A 2/13/2019    Procedure: EP Perm Pacer Single Lead;  Surgeon: Eder Che MD;  Location:  [facility] CARDIAC CATH LAB     ORTHOPEDIC SURGERY           Prior to Admission Medications   Prior to Admission Medications   Prescriptions Last Dose Informant Patient Reported? Taking?   COENZYME Q10 PO 3/6/2019 at am Daughter Yes Yes   Sig: Take 1 tablet by mouth daily   LORazepam (ATIVAN) 0.5 MG tablet 3/6/2019 at 0900 Daughter Yes Yes   Sig: Take 0.25-0.5 mg by mouth daily as needed for anxiety   Magnesium Oxide 250 MG TABS 3/5/2019 at pm Daughter Yes Yes   Sig: Take 1 tablet by mouth daily   VITAMIN E PO 3/6/2019 at am Daughter Yes Yes   Sig: Take 1 tablet by mouth daily   Vitamin D, Cholecalciferol, 1000 units TABS 3/5/2019 at pm Daughter Yes Yes   Sig: Take 1 tablet by mouth daily   apixaban ANTICOAGULANT (ELIQUIS) 5 MG tablet 3/6/2019 at am Daughter No Yes   Sig: Take 1 tablet (5 mg) by mouth 2 times daily   calcium carbonate-vitamin D (OS-LILIANA) 500-400 MG-UNIT tablet 3/5/2019 at pm Daughter Yes Yes   Sig: Take 1 tablet by mouth daily   diltiazem ER (DILT-XR) 120 MG 24 hr capsule 3/5/2019 at 1900 Daughter No Yes   Sig: Take 1 capsule (120 mg) by mouth daily   fish oil-omega-3 fatty acids 1000 MG capsule 3/6/2019 at am Daughter Yes Yes   Sig: Take 1 g by mouth daily   melatonin 3 MG tablet 3/5/2019 at hs Daughter Yes Yes   Sig: Take 6 mg by mouth At Bedtime   vitamin C (ASCORBIC ACID) 500 MG tablet 3/6/2019 at am Daughter Yes Yes   Sig: Take 500 mg by mouth daily   zinc gluconate 50 MG tablet 3/6/2019 at am Daughter Yes Yes   Sig: Take  50 mg by mouth daily      Facility-Administered Medications: None     Allergies   Allergies   Allergen Reactions     No Known Allergies        Social History   No smoking history. No alcohol use. Currently lives with wife and daughter. Does not use assistive device at baseline.     Family History   Family history reviewed with patient and is noncontributory.    Review of Systems   The 10 point Review of Systems is negative other than noted in the HPI or here.     Physical Exam   Temp: 96.3  F (35.7  C) Temp src: Axillary BP: 131/85 Pulse: 72   Resp: 18 SpO2: 98 % O2 Device: None (Room air)    Vital Signs with Ranges  Temp:  [96.3  F (35.7  C)-99.1  F (37.3  C)] 96.3  F (35.7  C)  Pulse:  [72-75] 72  Resp:  [18] 18  BP: (131-165)/(85-93) 131/85  SpO2:  [98 %-100 %] 98 %  0 lbs 0 oz    Temp: 96.3  F (35.7  C) Temp src: Axillary BP: 131/85 Pulse: 72   Resp: 18 SpO2: 98 % O2 Device: None (Room air)    There were no vitals filed for this visit.  Vital Signs with Ranges  Temp:  [96.3  F (35.7  C)-99.1  F (37.3  C)] 96.3  F (35.7  C)  Pulse:  [72-75] 72  Resp:  [18] 18  BP: (131-165)/(85-93) 131/85  SpO2:  [98 %-100 %] 98 %  No intake/output data recorded.    Constitutional: Alert, sitting up in bed. Oriented to self only at this time. Appears comfortable. Answers questions, follows most commands.   ENT: normal cephalic, dry mucous membranes  Eyes:  Pupils are equal and reactive to light, EOMI  Respiratory: Lungs clear to auscultation bilaterally, no increased work of breathing or wheezing   Cardiovascular: Regular rate and rhythm, no murmur, no LE edema, distal pulses +2/4  GI: active bowel sounds, abdomen soft, non-tender  Skin/Integumen: warm, dry. No rash  MSK:  Moves all four extremities. No gross deformity  Neuro:  Face symmetric. Cranial nerves 2-12 grossly intact. No focal deficits. strength is +5/5 and equal bilaterally. Follows commands.       Data   Data reviewed today:  I personally reviewed no images or EKG's  today.  Recent Labs   Lab 03/06/19  1130   WBC 6.2   HGB 13.4   MCV 97         POTASSIUM 4.5   CHLORIDE 100   CO2 27   BUN 15   CR 0.99   ANIONGAP 7   LILIANA 9.1   GLC 99       Recent Results (from the past 24 hour(s))   XR Chest 2 Views    Narrative    CHEST TWO VIEWS  3/6/2019 11:49 AM     HISTORY:  Altered mental status.    COMPARISON: 2/4/2019.      Impression    IMPRESSION: No significant interval change. Single-lead cardiac device  right chest wall, with lead tip projected over the RV. Lungs clear. No  pneumothorax. No pleural effusions are identified, however the  posterior costophrenic angles are not entirely included on this exam.  Heart size appears stable. Pulmonary vascularity is within normal  limits. Calcified tortuous thoracic aorta.    DESI BURGESS MD   Head CT w/o contrast    Narrative    CT SCAN OF THE HEAD WITHOUT CONTRAST   3/6/2019 1:31 PM     HISTORY: Altered level of consciousness.    TECHNIQUE:  Axial images of the head and coronal reformations without  IV contrast material. Radiation dose for this scan was reduced using  automated exposure control, adjustment of the mA and/or kV according  to patient size, or iterative reconstruction technique.    COMPARISON: Head CT 12/27/2018.    FINDINGS: There is no evidence of intracranial hemorrhage, mass, acute  infarct or anomaly. There is generalized atrophy of the brain. There  is low attenuation in the white matter of the cerebral hemispheres  consistent with sequelae of small vessel ischemic disease. Ventricular  size is within normal limits without evidence of hydrocephalus.     The visualized portions of the sinuses and mastoids appear normal. The  bony calvarium and bones of the skull base appear intact.       Impression    IMPRESSION:     1. No evidence of acute intracranial hemorrhage, mass, or herniation.  2. There is generalized atrophy of the brain. White matter changes are  present in the cerebral hemispheres that are  consistent with small  vessel ischemic disease in this age patient.    SUNNY SR MD

## 2019-03-06 NOTE — ED NOTES
Bed: ED04  Expected date:   Expected time:   Means of arrival:   Comments:  sweta - 5185 - 83 M diarrhea eta 1058

## 2019-03-06 NOTE — PHARMACY-ADMISSION MEDICATION HISTORY
Admission medication history interview status for the 3/6/2019  admission is complete. See EPIC admission navigator for prior to admission medications     Medication history source reliability:Good    Actions taken by pharmacist (provider contacted, etc): Spoke with patient's daughter. She had the medication bottles and a med list with her     Additional medication history information not noted on PTA med list :None    Medication reconciliation/reorder completed by provider prior to medication history? No    Time spent in this activity: 30 mins    Prior to Admission medications    Medication Sig Last Dose Taking? Auth Provider   apixaban ANTICOAGULANT (ELIQUIS) 5 MG tablet Take 1 tablet (5 mg) by mouth 2 times daily 3/6/2019 at am Yes Eder Che MD   calcium carbonate-vitamin D (OS-LILIANA) 500-400 MG-UNIT tablet Take 1 tablet by mouth daily 3/5/2019 at pm Yes Unknown, Entered By History   COENZYME Q10 PO Take 1 tablet by mouth daily 3/6/2019 at am Yes Unknown, Entered By History   diltiazem ER (DILT-XR) 120 MG 24 hr capsule Take 1 capsule (120 mg) by mouth daily 3/5/2019 at 1900 Yes Eder Che MD   fish oil-omega-3 fatty acids 1000 MG capsule Take 1 g by mouth daily 3/6/2019 at am Yes Unknown, Entered By History   LORazepam (ATIVAN) 0.5 MG tablet Take 0.25-0.5 mg by mouth daily as needed for anxiety 3/6/2019 at 0900 Yes Unknown, Entered By History   Magnesium Oxide 250 MG TABS Take 1 tablet by mouth daily 3/5/2019 at pm Yes Unknown, Entered By History   melatonin 3 MG tablet Take 6 mg by mouth At Bedtime 3/5/2019 at hs Yes Unknown, Entered By History   vitamin C (ASCORBIC ACID) 500 MG tablet Take 500 mg by mouth daily 3/6/2019 at am Yes Unknown, Entered By History   Vitamin D, Cholecalciferol, 1000 units TABS Take 1 tablet by mouth daily 3/5/2019 at pm Yes Unknown, Entered By History   VITAMIN E PO Take 1 tablet by mouth daily 3/6/2019 at am Yes Unknown, Entered By History   zinc gluconate 50 MG tablet Take  50 mg by mouth daily 3/6/2019 at am Yes Unknown, Entered By History

## 2019-03-06 NOTE — TELEPHONE ENCOUNTER
Daughter called and said father was up 12 times last night on the medication but then the when she tried a night without it he was only up 3 times and she is not sure what to do.    She wants to know what she can do and she is concerned about his Dementia he wanted to go out last night and she is concerned about his safety also. He is 6 FT and his wife cant stop him.  He was dressed and ready to go milk the cows [place].  When he first wakes up now he is super confused and when he takes 2 of the Diltiazem he is confused all day.  The daughter is Primarily responsible for him and she is over whelmed.  She wants him to get some additional testing and or medication to help or suggestions of what she can do to help protect him.    [phone] Velma

## 2019-03-06 NOTE — ED NOTES
"[facility]  ED Nurse Handoff Report    ED Chief complaint: Urinary Frequency (pt to ED from home by EMS for diarrhea, urinary frequency. also pacemaker placed in February, increased confusion since February.)      ED Diagnosis:   Final diagnoses:   Altered mental status, unspecified altered mental status type       Code Status: Full Code    Allergies:   Allergies   Allergen Reactions     No Known Allergies        Activity level - Baseline/Home:  Stand with Assist    Activity Level - Current:   Stand with Assist     Needed?: No    Isolation: No  Infection: Not Applicable  Bariatric?: No    Vital Signs:   Vitals:    03/06/19 1100   BP: (!) 165/93   Pulse: 75   Resp: 18   Temp: 99.1  F (37.3  C)   TempSrc: Oral   SpO2: 100%       Cardiac Rhythm: ,           Pain level: 0-10 Pain Scale: 0    Is this patient confused?: Yes   Does this patient have a guardian?  No         If yes, is there guardianship documents in the Epic \"Code/ACP\" activity?  N/A         Guardian Notified?  N/A  [place] - Suicide Severity Rating Scale Completed?  Yes  If yes, what color did the patient score?  White    Patient Report: Initial Complaint: Urinary frequency , confusion, irritability.  Focused Assessment: Pt lives at home with wife and daughter has been staying the past few weeks after pt received a pacemaker. Pt given 0.5 Ativan to calm him down. Pt trying to leave the room and pulling out IV access. Pt thinking he is in penitentiary though daughter at bedside is lending great support  Tests Performed: Labs, imaging  Abnormal Results: see results  Treatments provided: Zyprexa and Ativan    Family Comments: Daughter at bedside offering more history pt unable t    OBS brochure/video discussed/provided to patient/family: Yes              Name of person given brochure if not patient: Daughter              Relationship to patient: Daughter    ED Medications:   Medications   LORazepam (ATIVAN) injection 0.5 mg (0.5 mg " Intravenous Given 3/6/19 1412)       Drips infusing?:  No    For the majority of the shift this patient was Yellow.   Interventions performed were Zyprexa and Ativan.    Severe Sepsis OR Septic Shock Diagnosis Present: No    To be done/followed up on inpatient unit:  Continue to monitor confusion    ED NURSE PHONE NUMBER: [phone]

## 2019-03-07 LAB
ANION GAP SERPL CALCULATED.3IONS-SCNC: 4 MMOL/L (ref 3–14)
BUN SERPL-MCNC: 11 MG/DL (ref 7–30)
CALCIUM SERPL-MCNC: 8.7 MG/DL (ref 8.5–10.1)
CHLORIDE SERPL-SCNC: 103 MMOL/L (ref 94–109)
CO2 SERPL-SCNC: 29 MMOL/L (ref 20–32)
CREAT SERPL-MCNC: 1.05 MG/DL (ref 0.66–1.25)
GFR SERPL CREATININE-BSD FRML MDRD: 66 ML/MIN/{1.73_M2}
GLUCOSE SERPL-MCNC: 83 MG/DL (ref 70–99)
POTASSIUM SERPL-SCNC: 4 MMOL/L (ref 3.4–5.3)
SODIUM SERPL-SCNC: 136 MMOL/L (ref 133–144)

## 2019-03-07 PROCEDURE — 99207 ZZC CDG-CODE CATEGORY CHANGED: CPT | Performed by: PHYSICIAN ASSISTANT

## 2019-03-07 PROCEDURE — 25000132 ZZH RX MED GY IP 250 OP 250 PS 637: Performed by: PHYSICIAN ASSISTANT

## 2019-03-07 PROCEDURE — 25000132 ZZH RX MED GY IP 250 OP 250 PS 637: Performed by: PSYCHIATRY & NEUROLOGY

## 2019-03-07 PROCEDURE — G0378 HOSPITAL OBSERVATION PER HR: HCPCS

## 2019-03-07 PROCEDURE — 99214 OFFICE O/P EST MOD 30 MIN: CPT | Performed by: PSYCHIATRY & NEUROLOGY

## 2019-03-07 PROCEDURE — 36415 COLL VENOUS BLD VENIPUNCTURE: CPT | Performed by: PHYSICIAN ASSISTANT

## 2019-03-07 PROCEDURE — 99226 ZZC SUBSEQUENT OBSERVATION CARE,LEVEL III: CPT | Performed by: PHYSICIAN ASSISTANT

## 2019-03-07 PROCEDURE — 80048 BASIC METABOLIC PNL TOTAL CA: CPT | Performed by: PHYSICIAN ASSISTANT

## 2019-03-07 RX ADMIN — APIXABAN 5 MG: 5 TABLET, FILM COATED ORAL at 08:17

## 2019-03-07 RX ADMIN — OLANZAPINE 2.5 MG: 5 TABLET, ORALLY DISINTEGRATING ORAL at 19:38

## 2019-03-07 RX ADMIN — OLANZAPINE 2.5 MG: 5 TABLET, ORALLY DISINTEGRATING ORAL at 16:05

## 2019-03-07 RX ADMIN — APIXABAN 5 MG: 5 TABLET, FILM COATED ORAL at 19:38

## 2019-03-07 NOTE — PLAN OF CARE
AVSS; pt alert to self only; calm and cooperative during the night;  denied pain; up with 1 assist and a walker; voiding with PVR s < 100 ml; pt slept most of the night; sitter at bedside; on aspiration precautions; psych, neuropsych, and social work consulted.

## 2019-03-07 NOTE — PROGRESS NOTES
Observation goals PRIOR TO DISCHARGE    Comments:  Safe Dispo : Not met     Psych consult : Met

## 2019-03-07 NOTE — PROGRESS NOTES
Observation goals PRIOR TO DISCHARGE    Comments:  Safe Dispo : Not met    Psych consult : Not met

## 2019-03-07 NOTE — PROGRESS NOTES
[facility]    Hospitalist Progress Note      Assessment & Plan   Suspected dementia with behavioral disturbance. Patient with suspected dementia and decline overall since March 2018. Admitted here in December with atrial fibrillation and AMS and discharged to memory care TCU on scheduled Seroquel. Family reports experience was poor and felt patient was over-sedated even with low doses of Seroquel. He has not yet had formal Neuropsych testing or seen Neurology as he has significant anxiety attending clinic appointments. Currently lives with wife and daughter who report increased difficulty with managing patient as well as increased confusion over past week or so. WBC normal, patient afebrile with bland UA and CXR. No other infectious symptoms. Daughter concerned diltiazem may be affecting mental status we all. Family interested in discussion on placement, [facility]. LTC vs memory care TCU may be appropriate while [facility].  --Psych consulted, appreciate assistance. Start zyprexa 2.5mg @hs with prn dosing bid for agitation. Family does not want Seroquel to be used.   --will order Neuropsych consult for formal testing though uncertain whether this will be available in the hospital   --continue melatonin 6mg @hs, they report abdominal pain and diarrhea with trazodone use  --sitter tonight if needed   --SW consult for discharge planning     Urinary frequency. UA bland. ?retention. Thus far only one notable PVR of 240.   --continue bladder scan and record PVR     Hypertension. PTA diltiazem stopped on admission as family concerned this is worsening mental status. Will watch BP off medications today and consider adding additional agent if needed. Prn hydralazine if needed   --BP stable thus far      Paroxymal atrial fibrillation s/p ablation and pacemaker placement 2/13/19  EKG showed vpaced rhythm. Metoprolol and diltiazem stopped after  procedure; diltiazem added back for elevated BP. Last ECHO 12/2018 with EF 45-50%, possibly tachycardia induced.   --continue PTA Eliquis      History of aspiration pneumonia. Followed by home SLP per daughter and on modified thickened liquid diet for a while. Now back on regular diet and daughter has not noticed any symptoms of aspiration recently. She declines speech consult here.   --aspiration precautions     DVT Prophylaxis: Eliquis   Code Status: Full Code    Disposition: Expected discharge pending placement. May be several days. Family meeting with  this afternoon     This patient was seen and examined with Dr. Ibrahim who agrees with the above plan.    Gypsy Guardado PA-C    Interval History   Patient oriented to self and daughter only today. Calm and cooperative at time of my visit. Denies pain, SOB, nausea. Tolerated good breakfast and slept well per staff. No prns needed today thus far.     -Data reviewed today: I reviewed all new labs and imaging results over the last 24 hours. I personally reviewed no images or EKG's today.    Physical Exam   Temp: 96  F (35.6  C) Temp src: Oral BP: 124/87 Pulse: 70   Resp: 18 SpO2: 100 % O2 Device: None (Room air)    There were no vitals filed for this visit.  Vital Signs with Ranges  Temp:  [96  F (35.6  C)-96.3  F (35.7  C)] 96  F (35.6  C)  Pulse:  [70-75] 70  Resp:  [18] 18  BP: (124-131)/(85-88) 124/87  SpO2:  [98 %-100 %] 100 %  I/O last 3 completed shifts:  In: 1280 [P.O.:1280]  Out: -     Constitutional: Alert, sitting up in chair. Oriented to self only at this time though says daughter was here today. Appears comfortable. Answers questions, follows most commands.   ENT: normal cephalic, moist mucous membranes  Eyes:  Pupils are equal and reactive to light, EOMI  Respiratory: Lungs clear to auscultation bilaterally, no increased work of breathing or wheezing   Cardiovascular: Regular rate and rhythm, no murmur, no LE edema, distal pulses +2/4  GI: active  bowel sounds, abdomen soft, non-tender  Skin/Integumen: warm, dry. No rash  MSK:  Moves all four extremities. No gross deformity  Neuro:  Face symmetric. Cranial nerves 2-12 grossly intact. No focal deficits. strength is +5/5 and equal bilaterally. Follows commands.         Medications       apixaban ANTICOAGULANT  5 mg Oral BID     melatonin  6 mg Oral At Bedtime     OLANZapine zydis  2.5 mg Oral At Bedtime       Data   Recent Labs   Lab 03/07/19  0635 03/06/19  1130   WBC  --  6.2   HGB  --  13.4   MCV  --  97   PLT  --  290    134   POTASSIUM 4.0 4.5   CHLORIDE 103 100   CO2 29 27   BUN 11 15   CR 1.05 0.99   ANIONGAP 4 7   LILIANA 8.7 9.1   GLC 83 99       No results found for this or any previous visit (from the past 24 hour(s)).

## 2019-03-07 NOTE — CONSULTS
Pt seen for initial psychiatric evaluation, please see my dictation for details and recommendations. Zyprexa Zydis 2.5 mg qhs and 2.5-5 mg bid prn. Recommend memory care referral based on collateral data from daughter.

## 2019-03-07 NOTE — PROGRESS NOTES
MD Notification    Notified Person: MD    Notified Person Name: Dr. Miah Zaldivar     Notification Date/Time: 3/7/2019 @ 11am    Notification Interaction: Phone    Purpose of Notification: Psych consult since yesterday. Is the pt on the schedule to be seen today?    Orders Received:  Dr. Zaldivar will see the pt today, no scheduled time.     Comments:

## 2019-03-07 NOTE — PROGRESS NOTES
Observation goals PRIOR TO DISCHARGE    Comments:  Safe Dispo : Not met     Psych consult : Not met

## 2019-03-07 NOTE — PLAN OF CARE
Alert to place and self. Saline locked. Regular diet, aspiration precautions. A+1, unsteady when ambulating. Urinary frequency during shift, unable to measure o/p, PVRs <100mL. Daughter living w/ pt and pt wife, here during part of shift, pt needs sitter if daughter not present, has been calm during shift, doesn't call appropriately. Good appetite, drinking fluids, no sign of aspiration. Plan for Neuropsych, psych and SW consults tomorrow. Continue to monitor.

## 2019-03-07 NOTE — PLAN OF CARE
"PRIMARY DIAGNOSIS: \"GENERIC\" NURSING  OUTPATIENT/OBSERVATION GOALS TO BE MET BEFORE DISCHARGE:  ADLs back to baseline: No    Activity and level of assistance: Up with standby assistance.    Pain status: Pain free.    Return to near baseline physical activity: No    5. Psych/Neuropsych/SW consults completed: No     Discharge Planner Nurse   Safe discharge environment identified: No  Barriers to discharge: Yes       Entered by: Norah Ellis 03/06/2019 7:56 PM     Please review provider order for any additional goals.   Nurse to notify provider when observation goals have been met and patient is ready for discharge.  "

## 2019-03-07 NOTE — PLAN OF CARE
"PRIMARY DIAGNOSIS: \"GENERIC\" NURSING  OUTPATIENT/OBSERVATION GOALS TO BE MET BEFORE DISCHARGE:  1. ADLs back to baseline: No    2. Activity and level of assistance: A+1    3. Pain status: Pain free.    4. Return to near baseline physical activity: No    5. Psych/Neuropsych/SW consults completed: No     Discharge Planner Nurse   Safe discharge environment identified: No  Barriers to discharge: Yes       Entered by: Norah Ellis 03/06/2019 9:02 PM     Please review provider order for any additional goals.   Nurse to notify provider when observation goals have been met and patient is ready for discharge.  "

## 2019-03-08 PROCEDURE — 99207 ZZC CDG-CODE CATEGORY CHANGED: CPT | Performed by: PHYSICIAN ASSISTANT

## 2019-03-08 PROCEDURE — 99225 ZZC SUBSEQUENT OBSERVATION CARE,LEVEL II: CPT | Performed by: PHYSICIAN ASSISTANT

## 2019-03-08 PROCEDURE — 25000132 ZZH RX MED GY IP 250 OP 250 PS 637: Performed by: PSYCHIATRY & NEUROLOGY

## 2019-03-08 PROCEDURE — 99207 ZZC APP CREDIT; MD BILLING SHARED VISIT: CPT | Performed by: PHYSICIAN ASSISTANT

## 2019-03-08 PROCEDURE — 25000132 ZZH RX MED GY IP 250 OP 250 PS 637: Performed by: PHYSICIAN ASSISTANT

## 2019-03-08 PROCEDURE — G0378 HOSPITAL OBSERVATION PER HR: HCPCS

## 2019-03-08 RX ADMIN — APIXABAN 5 MG: 5 TABLET, FILM COATED ORAL at 08:17

## 2019-03-08 RX ADMIN — APIXABAN 5 MG: 5 TABLET, FILM COATED ORAL at 20:09

## 2019-03-08 RX ADMIN — MELATONIN TAB 3 MG 6 MG: 3 TAB at 20:08

## 2019-03-08 RX ADMIN — OLANZAPINE 2.5 MG: 5 TABLET, FILM COATED ORAL at 20:09

## 2019-03-08 RX ADMIN — OLANZAPINE 5 MG: 5 TABLET, ORALLY DISINTEGRATING ORAL at 15:20

## 2019-03-08 NOTE — CODE/RAPID RESPONSE
Code 21. Patient ambulating in hallway, refused to come back and wanting to go home. After multiple redirection from different staff, patient refused to sit in wheel chair. Code 21 called.

## 2019-03-08 NOTE — CONSULTS
Discussed case with nursing. Given level of cognitive difficulties being experienced neuropsych testing is not indicated and not likely to yield meaningful results unless pt is refusing discharge recommendations. Recommend testing be deferred.    Thank you for this kind referral.    León Galicia, PhD, LP

## 2019-03-08 NOTE — PLAN OF CARE
Observation goals PRIOR TO DISCHARGE    Comments:  Safe Dispo : Not met  Psych consult : Met    Alert to self only. VSS on RA. Setting off chair alarms frequently on eves, Long arm sitter. Zyprexa prn given x1, w/ relief overnight.Incontinent of bladder at times, PVR<150. Pulled out IV-ok to leave out per MD. Denies pain.Rex reg diet well. Ambulating in the hallway w/ A1+gaitbelt. Pysch evaluation done, recommend memory care. Awaiting TCU placement.

## 2019-03-08 NOTE — PLAN OF CARE
Alert to self, forgetful, confused. Pt aggressive and agitated this am, wanted to go home, stated he was walking home, could not redirect, Code 21 called, security able to get pt in w/c and back to room w/o interventions, see previous notes. Sitter @ bedside. PVRs 500+, and 419 today. Regular diet, good appetite. No IV site. SBA. SW following for discharge, plan is to discharge to memory care facility. Psych consult completed. Writer talked w/ Dr. Debi alberts about doing an assessment, per MD neuropsych assessment not needed bc pt orientation and mentation only A to self, needing memory care, assessment would only confirm this. Continue to monitor.

## 2019-03-08 NOTE — PLAN OF CARE
"PRIMARY DIAGNOSIS: \"GENERIC\" NURSING  OUTPATIENT/OBSERVATION GOALS TO BE MET BEFORE DISCHARGE:  1. ADLs back to baseline: No    2. Activity and level of assistance: A+1    3. Pain status: Pain free.    4. Return to near baseline physical activity: No    5. Psych/Neuropsych/SW consults completed: No     Discharge Planner Nurse   Safe discharge environment identified: No  Barriers to discharge: Yes       Entered by: Norah Ellis 03/08/2019 12:40 PM     Please review provider order for any additional goals.   Nurse to notify provider when observation goals have been met and patient is ready for discharge.  "

## 2019-03-08 NOTE — PROGRESS NOTES
MD Notification    Notified Person: MD    Notified Person Name: MARK Maxwell    Notification Date/Time: 3/7/19 2140    Notification Interaction: Paged    Purpose of Notification: Pt confused, pulled out IV. Not receiving anything IV. Do we need to replace IV?    Orders Received: Pending.     Comments: Will continue to monitor.     Repaged @ [phone] to Dr. Meyer.     Orders received: Ok to leave IV out.

## 2019-03-08 NOTE — CODE/RAPID RESPONSE
"[facility]    RRT Note:  CODE 21  3/8/2019   Time Called: 0847  Code Status: Full Code    RRT called for: Code 21    Called for patient trying to leave the observation unit and \"walk home\", declining re-direction from staff.    Assessment & Plan   IMPRESSION & PLAN:    Behavioral disturbance with suspected dementia, attempting to elope:   -- Accepted verbal de-escalation, distraction, re-direction to mostly cooperative.  -- No injury to pt or staff.   -- No restraints required    No restraints required, needs frequent re-direction and should continue with a sitter (from long arm to formal bedside sitter).    INTERVENTIONS:  - Verbal redirection with support of security staff, RN, and myself  - Offered meal, bathroom  - PRN Zyprexa ODT available but not necessary for de-escalation  - Escorted by code 21 support staff back to room where patient was calm and agreeable  - Increase supervision from long arm sitter to formal sitter  - I updated the daughter who arrived shortly after the code 21 as well as Hospitalist Gypsy MEJIA    Interval History   Paul Milligan is a 83 year old male who was admitted on 3/6/2019 for suspected dementia with behavioral disturbance.    Medical history significant for:   Past Medical History:   Diagnosis Date     Atrial fibrillation (H)      Dementia      Pacemaker      Past Surgical History:   Procedure Laterality Date     CARDIAC SURGERY       EP PERM PACER SINGLE LEAD N/A 2/13/2019    Procedure: EP Perm Pacer Single Lead;  Surgeon: Eder Che MD;  Location:  [facility]     ORTHOPEDIC SURGERY         Allergies   Allergies   Allergen Reactions     No Known Allergies        Physical Exam   Vital Signs with Ranges:  Temp:  [96.1  F (35.6  C)-97.4  F (36.3  C)] 96.1  F (35.6  C)  Pulse:  [70-74] 70  Resp:  [16-18] 16  BP: (119-127)/(72-78) 119/76  SpO2:  [99 %-100 %] 100 %  I/O last 3 completed shifts:  In: 960 [P.O.:960]  Out: 500 " [Urine:500]      Physical Exam   Constitutional: He appears well-developed.   HENT:   Head: Normocephalic and atraumatic.   Eyes: EOM are normal.   Pulmonary/Chest: Effort normal.   Neurological: He is alert.   Psychiatric: His mood appears anxious. His speech is delayed. He is agitated. Cognition and memory are impaired.       Data     IMAGING: (X-ray/CT/MRI)   No results found for this or any previous visit (from the past 24 hour(s)).    CBC with Diff:  Recent Labs   Lab Test 03/06/19  1130   WBC 6.2   HGB 13.4   MCV 97         No results found for: RETICABSCT  No results found for: RETP    Lactic Acid:    No results found for: LACT        Comprehensive Metabolic Panel:  Recent Labs   Lab 03/07/19  0635      POTASSIUM 4.0   CHLORIDE 103   CO2 29   ANIONGAP 4   GLC 83   BUN 11   CR 1.05   GFRESTIMATED 66   GFRESTBLACK 76   LILIANA 8.7       UA:  Recent Labs   Lab 03/06/19  1226   COLOR Yellow   APPEARANCE Slightly Cloudy   URINEGLC Negative   URINEBILI Negative   URINEKETONE Negative   SG 1.011   UBLD Negative   URINEPH 7.0   PROTEIN Negative   NITRITE Negative   LEUKEST Negative   RBCU 0   WBCU 0       Time Spent on this Encounter   I spent 10 minutes on the unit/floor managing the care of Paul Milligan. 100% of my time was spent counseling the patient and/or coordinating care regarding services listed in this note.    Yanely Sheffield PA-C  Hospitalist/House [facility]  Pager: [phone]

## 2019-03-08 NOTE — PROGRESS NOTES
[facility]    Hospitalist Progress Note      Assessment & Plan   Paul Milligan is a 83 year old male with a past medical history significant for atrial fibrillation s/p recent ablation and pacemaker placement, cardiomyopathy, hypertension, history of aspiration pneumonia, and hypertension who presented to the Emergency Department with family for evaluation of confusion and urinary frequency.     Suspected dementia with behavioral disturbance. Patient with suspected dementia and decline overall since March 2018. Admitted here in December with atrial fibrillation and AMS and discharged to memory care TCU on scheduled Seroquel. Family reports experience was poor and felt patient was over-sedated even with low doses of Seroquel. He has not yet had formal Neuropsych testing or seen Neurology as he has significant anxiety attending clinic appointments. Currently lives with wife and daughter who report increased difficulty with managing patient as well as increased confusion over past week or so. WBC normal, patient afebrile with bland UA and CXR. No other infectious symptoms. Daughter concerned diltiazem may be affecting mental status we all. Family interested in discussion on placement, [facility]. LTC vs memory care TCU may be appropriate while awaiting long [facility].  --Psych consulted, appreciate assistance. Start zyprexa 2.5mg @hs with prn dosing bid for agitation. Family does not want Seroquel to be used.   -- Neuropsych consult for formal testing, they are unable to see here in the hospital   --continue melatonin 6mg @hs, they report abdominal pain and diarrhea with trazodone use  --SW consult for discharge planning, plan for discharge to memory care when placement available      Urinary frequency. UA bland. ?retention. Thus far notable PVR of 240, 500 this am.   --continue bladder scan and record PVR   --consider flomax if persistent elevated  PVR    Hypertension. PTA diltiazem stopped on admission as family concerned this is worsening mental status. Will watch BP off medications today and consider adding additional agent if needed. Prn hydralazine if needed   --BP stable thus far off meds     Paroxymal atrial fibrillation s/p ablation and pacemaker placement 2/13/19  EKG showed vpaced rhythm. Metoprolol and diltiazem stopped after procedure; diltiazem added back for elevated BP. Last ECHO 12/2018 with EF 45-50%, possibly tachycardia induced.   --continue PTA Eliquis      History of aspiration pneumonia. Followed by home SLP per daughter and on modified thickened liquid diet for a while. Now back on regular diet and daughter has not noticed any symptoms of aspiration recently. She declines speech consult here.   --aspiration precautions     DVT Prophylaxis: Ambulate every shift  Code Status: Full Code    Disposition: Expected discharge once placement is found    This patient was seen and examined with Dr. Ibrahim who agrees with the above plan.    Gypsy Guardado PA-C    Interval History   Code 21 this am though patient redirectable and did not require medication. He is calm and cooperative at time of my visit. Denies pain, nausea. Ate is whole breakfast.     Discussed plan with daughter today for 30 minutes.     -Data reviewed today: I reviewed all new labs and imaging results over the last 24 hours. I personally reviewed no images or EKG's today.    Physical Exam   Temp: 96.9  F (36.1  C) Temp src: Oral BP: 103/64 Pulse: 89   Resp: 16 SpO2: 100 % O2 Device: None (Room air)    Vitals:    03/08/19 0652   Weight: 61.8 kg (136 lb 4.8 oz)     Vital Signs with Ranges  Temp:  [96.1  F (35.6  C)-97.4  F (36.3  C)] 96.9  F (36.1  C)  Pulse:  [70-89] 89  Resp:  [16-18] 16  BP: (103-127)/(64-78) 103/64  SpO2:  [99 %-100 %] 100 %  I/O last 3 completed shifts:  In: 960 [P.O.:960]  Out: 500 [Urine:500]    Constitutional: Alert, sitting up in bed. Oriented to self  only at this time though says daughter was here today. Appears comfortable. Answers questions, follows most commands.   ENT: normal cephalic, moist mucous membranes  Eyes:  Pupils are equal and reactive to light, EOMI  Respiratory: Lungs clear to auscultation bilaterally, no increased work of breathing or wheezing   Cardiovascular: Regular rate and rhythm, no murmur, no LE edema, distal pulses +2/4  GI: active bowel sounds, abdomen soft, non-tender  Skin/Integumen: warm, dry. No rash  MSK:  Moves all four extremities. No gross deformity. Moves bilateral LE equally   Neuro:  Face symmetric. Cranial nerves 2-12 grossly intact. No focal deficits. strength is +5/5 and equal bilaterally. Follows commands.       Medications       apixaban ANTICOAGULANT  5 mg Oral BID     melatonin  6 mg Oral At Bedtime     OLANZapine zydis  2.5 mg Oral At Bedtime       Data   Recent Labs   Lab 03/07/19  0635 03/06/19  1130   WBC  --  6.2   HGB  --  13.4   MCV  --  97   PLT  --  290    134   POTASSIUM 4.0 4.5   CHLORIDE 103 100   CO2 29 27   BUN 11 15   CR 1.05 0.99   ANIONGAP 4 7   LILIANA 8.7 9.1   GLC 83 99       No results found for this or any previous visit (from the past 24 hour(s)).

## 2019-03-08 NOTE — PROGRESS NOTES
Observation goals PRIOR TO DISCHARGE    Comments:   Safe Dispo: Not met    Psych consult : Met

## 2019-03-08 NOTE — PLAN OF CARE
Alert to self only. Setting off chair alarms frequently, Long arm utilized. Zyprexa given x1, w/ some relief.Incontinent of bladder at times, PVR<250. Denies pain.Rex reg diet well. Ambulating in the hallway w/ A1+gaitbelt. Pysch evaluation done, recommend memory care.

## 2019-03-08 NOTE — PROGRESS NOTES
Called Neuropsych, Dr. Galicia  And left a V.mail to call back and confirm if they are coming to see patient before discharge. Will continue to monitor.

## 2019-03-08 NOTE — PROGRESS NOTES
Notified by social work and primary provider that family interested in getting a [facility]. I called [facility] and obtained [facility] primary care establish care appointment for the patient for Friday, March 22nd at 9 am with Dr. Ware. A welcome packet will be mailed to the home.

## 2019-03-08 NOTE — PLAN OF CARE
"PRIMARY DIAGNOSIS: \"GENERIC\" NURSING  OUTPATIENT/OBSERVATION GOALS TO BE MET BEFORE DISCHARGE:  1. ADLs back to baseline: No    2. Activity and level of assistance: A+1    3. Pain status: Pain free.    4. Return to near baseline physical activity: No    5. Psych/Neuropsych/SW consults completed: No     Discharge Planner Nurse   Safe discharge environment identified: No  Barriers to discharge: Yes       Entered by: Norah Ellis 03/08/2019 10:58 AM     Please review provider order for any additional goals.   Nurse to notify provider when observation goals have been met and patient is ready for discharge.  "

## 2019-03-08 NOTE — CONSULTS
"Consult Date:  03/07/2019      REASON FOR CONSULTATION:  Dementia with behavioral disturbance, did poorly with Seroquel per family.  Consider geriatric psychiatry.        REQUESTING PROVIDER:  Gypsy Guardado PA-C.      PRIMARY CARE PHYSICIAN:  Ismael العلي MD      IDENTIFYING DATA:  Paul Milligan is an 83-year-old man who is  and has 2 daughters.  He normally resides with his wife in [place] and is a retired , who presented to [facility] ED on 03/06/2019 via EMS for evaluation of urinary frequency.  The patient's daughter has been residing with him and his wife for the last month on account of his declining cognition.  Psychiatry was consulted to help render an opinion on his behavioral disturbance and confusion.  Information was gathered through direct patient contact, chart review as well as collateral data provided by the patient's daughter, Velma Garrett.      CHIEF COMPLAINT:  \"I am supposed to be meeting my cousin here and I'm trying to find a place to make a phone call.\"      HISTORY OF PRESENT ILLNESS:  Paul Milligan was seen by my colleague Han Martinez DO in December on account of similar symptoms.  Information provided by his daughter suggests that he had been in his usual state of health up until 03/2018, when the family noticed that he was getting intermittently confused.  This reportedly occurred in the setting of taking his vehicle to the tire shop for a change of tires and while they were there, he reportedly became confused, stating he did not know why they were there.  Since then, the patient's daughter reports that she has observed a somewhat waxing and waning pattern in his disorientation and confusion.  She states she can probably count as many as 20+ times when he has had bouts of disorientation and confusion.  She states over time she had noticed that he had also quit driving to visit with her in [place], and he had stopped driving " as far as [place] which was typical for him.  She states it got to the point where he was only driving to the local called pharmacy and was essentially content being at home.  Prior to that, he was very meticulous about his personal care and care for his home and yard.  She states she had observed that he had become increasingly confused and forgetful.  The patient's daughter reports that her mother does not drive and so it had become increasingly challenging for the two of them to have limited mobility.  His daughter reports that earlier in the winter, the patient was still able to shovel snow and normally would walk about 2 miles per day when the weather was good, but since winter came, he has declined significantly, but for the most part, he is still able to take care of his ADLs.        She states she observed a marked increase in his confusion, after he was started on diltiazem and the dose was doubled.  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  At some point, he had trouble recognizing his daughter, which was abnormal for him.  Five days ago, his daughter stopped his diltiazem and called his clinic because she was concerned that this was increasing his confusion.  Per her report, his confusion abated, and they did not notice any significant worsening of his cardiac status.  About 2 days ago, they noticed that he had had increase in urinary frequency to the point that he was up all night using the bathroom.  He reportedly had also had loose stools over the past couple days, which was very concerning to the patient's daughter on account of which she called EMS.        Since admission to the observation unit, the patient has displayed confusion suggesting the development of delirium.  His daughter reports that this is not atypical for him, as this has happened in the past.  The patient was recently placed in the TCU  following his last hospitalization, and he reportedly did poorly on Seroquel.  He also was assessed as having aspiration pneumonia on account of which he was placed on antibiotics, which reportedly improved his functioning.  However, his daughter reports that he probably had not seen a physician for more than 20 years.  The patient's daughter reports that he had also not had a good experience on trazodone, as he continued to experience significant insomnia.  At this point, there is significant concern about the patient's ability to return to independent functioning on account of his forgetfulness and significant disorientation.      With respect to mental health symptoms, the patient has no history of mental illness.  He does not have a formal diagnosis of dementia and he has never been through neuropsychological assessment for diagnostic clarification.  He is not able to respond to queries regarding neurovegetative symptoms of depression.  He does make fair eye contact, but he is quite disoriented at the time of this assessment.  He is currently only oriented to self.      PAST PSYCHIATRIC HISTORY:  None given.      CHEMICAL USE HISTORY:  None reported.      PAST MEDICAL HISTORY:  Atrial fibrillation, pacemaker placement, history of dementia.      PAST SURGICAL HISTORY:  Orthopedic surgery, single lead permanent pacer insertion, right shoulder surgery.      MEDICATIONS PRIOR TO ADMISSION:   1.  Coenzyme Q10 one p.o. daily.   2.  Lorazepam 0.5 mg half to 1 p.o. daily p.r.n. anxiety.   3.  Magnesium oxide 250 mg p.o. daily.   4.  Vitamin E 1 p.o. daily.   5.  Vitamin D 1000 units p.o. daily.   6.  Eliquis 5 mg p.o. b.i.d.    7.  Os-Ryne 500/400 one p.o. daily.   8.  Diltiazem  mg p.o. daily.   9.  Fish oil Omega-3 fatty acids 1000 mg p.o. daily.   10.  Melatonin 3 mg 2 p.o. each day at bedtime.   11.  Vitamin C 500 mg p.o. daily.   11.  Zinc gluconate 50 mg p.o. daily.      ALLERGIES:  NO KNOWN DRUG ALLERGIES.       FAMILY PSYCHIATRIC HISTORY:  None reported.  However, there is history of cognitive impairment in his mother.      SOCIAL HISTORY:  The patient is  and resides in [place] with his wife.  He has two adult daughters.  He is retired from working as a  for [employer].  There is no history of  or criminal activity.      REVIEW OF SYSTEMS:  I refer the reader to the 10-point review of systems documented by Gypsy Guardado PA-C on 03/06/2019 at 5:36 p.m.      VITAL SIGNS:  Blood pressure 124/87, pulse 70, respirations 18, temperature 96, weight 61.8 kg.      MENTAL STATUS EXAMINATION:  This is an elderly man who appears his stated age.  He is seen at his bedside, where he is sitting out of bed in a chair with a chair alarm.  He is listening to a program on TV, but he appears confused.  His speech is soft, but clear and coherent.  His mood is described as anxious, and his affect is tense with heightened intensity.  His thought process is difficult to assess on account of his cognitive difficulties.  He is only oriented to self.  He keeps repeating that he in this room to meet his cousin and gives the maiden name of his wife per his daughter.  His attention and concentration are limited.  His recall of recent and remote events are impaired.  Risk assessment at this time is considered high on account of his impaired cognition.  His gait and station are not assessed as he is currently chair bound.      DIAGNOSTIC IMPRESSION:  Paul Milligan is an 83-year-old man with no known past psychiatric history who, for the most part, had been physically healthy with recent onset of memory difficulties beginning in 03/2018, with progressive decline over the past year, to the point that he has become significantly impaired.  There is concern by the family about his ability to continue to reside independently.  So far in the hospital, he has required a couple doses of Zyprexa Zydis on account  of his agitation, but he reportedly has not been aggressive toward anyone.  The benefits of referring him to a geriatric psychiatric unit, will be limited on account of his absence of a prior psychiatric illness, as his current presentation is completely related to his cognitive decline.  His situation was discussed with his daughter and his treating provider, and the plan at this time will be for him to undergo neuropsychological testing when able and possibly transition to a memory care unit by a transitional care facility.      DIAGNOSES:   1.  Delirium, most likely due to underlying major neurocognitive disorder due to Alzheimer's disease/urinary tract infection.   2.  Major neurocognitive disorder, most likely due to Alzheimer's disease.   3.  Hypertension.   4.  Paroxysmal atrial fibrillation, status post ablation and pacemaker placement, 2019.   5.  Recent aspiration pneumonia.      RECOMMENDATIONS:   1.  Medical management as you are.   2.  The patient will benefit from consistent activities to orient him.  He would also benefit from low dose olanzapine at 2.5 mg at bedtime to facilitate sleep and also address his agitation, and p.r.n.   Zyprexa Zydis will also be offered in the course of the day at 2.5 to 5 mg b.i.d. p.r.n. to address breakthrough agitation and possibly psychosis.  I would not recommend referring the patient to geriatric psychiatry at this point, but would rather strongly support placement of the patient in a memory care facility once his physical health problems have been addressed.  Psychiatry may be reconsulted as needed.      Thanks for the consult.         MIC WHITAKER MD             D: 2019   T: 2019   MT: KEIRA      Name:     MARCE BOO   MRN:      [id number]        Account:       [id number]   :      1935           Consult Date:  2019      Document: [id number]       cc: Ismael LOONEY PA-C

## 2019-03-08 NOTE — PROGRESS NOTES
SW: IRMA spoke to Tank at [facility] re:  TCU referral. Tank indicated his co-worker is reviewing and will call this writer back this morning. Return call pending.    1100: IRMA spoke to dtr Velma re: other placement options than TCU as patient clearly is not displaying a skilled need. Discussed other options as LTC, Residential Homes, Memory Care [facility], [facility]. Velma shared that she spoke to her mother this morning who wants him to stay in the [place] area and stated understanding that an interim placement will be necessary while the family is researching and obtaining correction facility, preferably with an Independent Living attached where his wife could move to to be near.    Dtr agreeable to correction/LTC  referrals to:  1-[facility]- no [facility] beds avail  [facility]  3-[facility]- openings in  correction but no LTC  beds avail. Return call from Jesse in [facility] re: Stay-by-the-Day room availability. Later informed that they have no stay-by-the day rooms avail.  4-[facility]  5-[facility]    IRMA to inquire about potential respite options as well. BLESSING for Janell in adm's at [facility] for respite apt availability.    SW to continue to follow and assist with discharge planning.

## 2019-03-08 NOTE — CODE/RAPID RESPONSE
"Writer heard pt yelling in room \"I am leaving and going home, I'll just walk home\". Writer and NA attempted multiple times to redirect pt back to pt room, unsuccessful. Pt continued to walk down hallway stating he was going home and would walk home. Pt was agitated, angry but not violent. Code 21 was called, security was able to talk pt into sitting in w/c and going back to room. Pt now calm and cooperative.  "

## 2019-03-09 PROCEDURE — 99207 ZZC CDG-MDM COMPONENT: MEETS LOW - DOWN CODED: CPT | Performed by: PHYSICIAN ASSISTANT

## 2019-03-09 PROCEDURE — 99225 ZZC SUBSEQUENT OBSERVATION CARE,LEVEL II: CPT | Performed by: PHYSICIAN ASSISTANT

## 2019-03-09 PROCEDURE — 25000132 ZZH RX MED GY IP 250 OP 250 PS 637: Performed by: PHYSICIAN ASSISTANT

## 2019-03-09 PROCEDURE — G0378 HOSPITAL OBSERVATION PER HR: HCPCS

## 2019-03-09 PROCEDURE — 25000132 ZZH RX MED GY IP 250 OP 250 PS 637: Performed by: PSYCHIATRY & NEUROLOGY

## 2019-03-09 RX ORDER — TAMSULOSIN HYDROCHLORIDE 0.4 MG/1
0.4 CAPSULE ORAL DAILY
Status: DISCONTINUED | OUTPATIENT
Start: 2019-03-09 | End: 2019-03-09

## 2019-03-09 RX ADMIN — OLANZAPINE 2.5 MG: 5 TABLET, FILM COATED ORAL at 20:05

## 2019-03-09 RX ADMIN — APIXABAN 5 MG: 5 TABLET, FILM COATED ORAL at 20:05

## 2019-03-09 RX ADMIN — MELATONIN TAB 3 MG 6 MG: 3 TAB at 20:04

## 2019-03-09 RX ADMIN — APIXABAN 5 MG: 5 TABLET, FILM COATED ORAL at 09:31

## 2019-03-09 NOTE — PROGRESS NOTES
[facility]    Medicine Progress Note - Hospitalist Service       Date of Admission:  3/6/2019  Assessment & Plan   Paul Milligan is a 83 year old male with a past medical history significant for atrial fibrillation s/p recent ablation and pacemaker placement, cardiomyopathy, hypertension, history of aspiration pneumonia, and hypertension who presented to the Emergency Department with family for evaluation of confusion and urinary frequency.      Suspected dementia with behavioral disturbance.   Suspected dementia and decline overall since March 2018. Admitted Dec 2018 w/ Afib, AMS and discharged to memory care TCU on scheduled Seroquel. Family reports experience was poor and felt pt was over-sedated even with low doses of Seroquel. He has not yet had formal Neuropsych testing or seen Neurology as he has significant anxiety attending clinic appointments. Currently lives with wife and daughter who report increased difficulty with managing pt as well as increased confusion over past week or so. WBC wnl, afebrile, neg UA and CXR. No other infectious symptoms. Daughter concerned diltiazem may be affecting mental status therefore it was stopped. Family interested in discussion on placement, hoping for [facility] facility if possible. LTC vs memory care TCU may be appropriate while awaiting long term placement through the V    - Psych consulted, appreciate assistance. Started on zyprexa 2.5mg QHS with prn dosing bid for agitation. Family does not want Seroquel to be used.   - Neuropsych referral on discharge for formal testing, they are unable to see here in the hospital   - Continue melatonin 6mg Q HS, they report abdominal pain and diarrhea with trazodone use  -  consult for discharge planning, plan for discharge to memory care when placement available   - Sitter discontinued     Urinary frequency, improved  UA bland. ?retention.   - continue bladder scan and record PVR, ranging 150s-500s, this AM   mL  - Consider flomax if continued suspicion for possible BPH     Hypertension.   PTA diltiazem stopped on admission as family concerned this is worsening mental status.   - Will watch BP off medications today and consider adding additional agent if needed.   - Prn hydralazine if needed   - BP stable thus far off meds - BPs running 100s-140s/60s-80s     Paroxymal atrial fibrillation s/p ablation and pacemaker placement 2/13/19  EKG showed V-paced rhythm. Metoprolol and diltiazem stopped after procedure; diltiazem added back for elevated BP but now discontinued as above. Last ECHO 12/2018 with EF 45-50%, possibly tachycardia induced.   - Continue PTA Eliquis      History of aspiration pneumonia.   Followed by home SLP per daughter and on modified thickened liquid diet for a while. Now back on regular diet and daughter has not noticed any symptoms of aspiration recently. She declines speech consult here.   - aspiration precautions     Diet: Regular Diet Adult    DVT Prophylaxis: Eliquis  Petersen Catheter: not present  Code Status: Full Code      Disposition Plan   Expected discharge: medically ready, hopeful for discharge in 1-2 days, recommended to Memory care TCU vs LTC once safe disposition plan/ TCU bed available.  Entered: Yanely Sheffield PA-C 03/09/2019, 7:31 AM       The patient's care was discussed with the Attending Physician, Dr. Ibrahim, Bedside Nurse, Patient and Patient's Family.    Yanely Sheffield PA-C  Hospitalist Service  [facility]    ______________________________________________________________________    Interval History   BPs stable. Saw with daughter at bedside who is very involved and pleasant. Says pt is more alert today, asking for bathroom and other cares appropriately. No current c/o. Pt commented on the weather today, able to answer some orientation questions. I answered all of the daughters questions.    Data reviewed today: I reviewed all medications, new labs and  "imaging results over the last 24 hours. I personally reviewed no images or EKG's today.    Physical Exam   Vital Signs: Temp: 96.5  F (35.8  C) Temp src: Oral BP: 139/89 Pulse: 75 Heart Rate: 75 Resp: 18 SpO2: 95 % O2 Device: None (Room air)    Weight: 136 lbs 4.8 oz    General: Awake, alert, thin elderly man who appears stated age. Looks comfortable sitting in chair. No acute distress.  HEENT: Normocephalic, atraumatic. Extraocular movements intact.   Respiratory: Clear to auscultation bilaterally, no rales, wheezing, or rhonchi.  Cardiovascular: Regular rate and rhythm, +S1 and S2, no murmur auscultated. No peripheral edema.   Gastrointestinal: Soft, non-tender, non-distended. Bowel sounds present.  Skin: Warm, dry. No obvious rashes or lesions on exposed skin. Dorsalis pedis pulses palpable bilaterally.  Musculoskeletal: No joint swelling, erythema or tenderness. Moves all extremities equally.  Neurologic: AAO x1-2. Cranial nerves 2-12 grossly intact, normal strength and sensation. States year is 2015, season is fall, and refers to president as \"Dump Trump\"  Psychiatric: Appropriate mood and somewhat flat affect. Soft spoken, hard of hearing. No obvious anxiety or depression.    Data   Recent Labs   Lab 03/07/19  0635 03/06/19  1130   WBC  --  6.2   HGB  --  13.4   MCV  --  97   PLT  --  290    134   POTASSIUM 4.0 4.5   CHLORIDE 103 100   CO2 29 27   BUN 11 15   CR 1.05 0.99   ANIONGAP 4 7   LILIANA 8.7 9.1   GLC 83 99     No results found for this or any previous visit (from the past 24 hour(s)).  Medications       apixaban ANTICOAGULANT  5 mg Oral BID     melatonin  6 mg Oral At Bedtime     OLANZapine zydis  2.5 mg Oral At Bedtime     "

## 2019-03-09 NOTE — PLAN OF CARE
"PRIMARY DIAGNOSIS: \"GENERIC\" NURSING  OUTPATIENT/OBSERVATION GOALS TO BE MET BEFORE DISCHARGE:  ADLs back to baseline: No    Activity and level of assistance: SBA    Pain status: Pain free.    Return to near baseline physical activity: Yes     Discharge Planner Nurse   Safe discharge environment identified: No  Barriers to discharge: Yes, Placement       Entered by: Mike Crews 03/09/2019 11:27 AM     Please review provider order for any additional goals.   Nurse to notify provider when observation goals have been met and patient is ready for discharge.  "

## 2019-03-09 NOTE — PLAN OF CARE
Pt is A&O to self . VSS on RA, no sign of pain noted. Up to bathroom and voided and had a BM. Rex reg diet well. Ambulating in room w/ A1 and gait bet. Awaiting [facility]/LTC memory care placement per psych consult suggestion, SW sent out referrals, waiting for response. Daughter at bedside. See PA note about PRN zyprexa.

## 2019-03-09 NOTE — PROGRESS NOTES
Observation goals PRIOR TO DISCHARGE    Comments:   Safe Dispo: Not met     Psych consult : Met

## 2019-03-09 NOTE — PROGRESS NOTES
Social Work Services Progress Note    Hospital Day: 3  Date of Initial Social Work Evaluation: 3/8/19  Collaborated with: Pt's daughter Velma, TCU facilities    Data: Pt is 83 year old male admitted due to dementia. Family is looking to TCU/LTC placement until they can facilitated placement in a memory AL  Intervention: SW spoke with the pt's daughter Velma regarding status of placement. Discussed that no facility at this time has accepted the pt for admission. Multiple referrals have been send for admission and Velma is hesitant to consider other facilities without knowing or seeing them. SW explained that with all the current denials we may need to try facilities that they would not normally consider knowing that this is only temperately as the pt works to get into an AL [facility], where the family is touring and putting a deposit down for. Velma does lives in [place] and we discussed that they are assessing the pt, but would require a $6000 deposit upon admission. Velma will try and tour tonight. SW also discussed options in the [facility] and others in [place] that they could consider as well. Sitter was removed 3/9/19  [facility]- Declined no  beds avail  [facility]- Declined  [facility]-Declined. No openings  [facility]-  to check status  [facility]- No appropriate beds  [facility]- Willing to assess if family is ok with $6000 deposit. Requested facility review pt  [facility]- Family refuses to return to facility.  [facility]- Declined  [facility]- Send 3/9 to check bed status  Assessment: pt is alert, confused  Plan:    Discharge Plans in Progress: discharge to LTC/ Respite     Barriers to d/c plan: Pt's behaviors, need for sitter, no facilities willing to accept the pt    Follow up Plan: SW will continue to follow for discharge planning.    DANYELLE Stein, [facility]  Phone [phone]

## 2019-03-09 NOTE — PLAN OF CARE
Alert to self only. VSS on RA. Scheduled and PRN Zyprexa given for agitation w/ relief. Sitter at bedside. PVR < 250. Rex reg diet well. Ambulating in the hallway w/ A1 and gait bet. Neuropsych testing is deferred d/t cognitive difficulties. Awaiting [facility]TC memory care placement.

## 2019-03-09 NOTE — PROGRESS NOTES
Observation goals PRIOR TO DISCHARGE    Comments:     -Safe Dispo: Not met     -Psych consult : Met

## 2019-03-09 NOTE — PLAN OF CARE
Pt is A&O to self . VSS on RA, no sign of pain noted.Sitter at bedside, patient slept most of the night. Was incontinent and voided a little in urinal and bladder scan was 490, later got up to bathroom and voided quite a bit and had a BM and PVR afterwards was 193. Rex reg diet well. Ambulating in room w/ A1 and gait bet. Awaiting [facility]TC memory care placement per psych consult suggestion, SW sent out referrals, waiting for response.

## 2019-03-09 NOTE — PLAN OF CARE
"PRIMARY DIAGNOSIS: \"GENERIC\" NURSING  OUTPATIENT/OBSERVATION GOALS TO BE MET BEFORE DISCHARGE:  ADLs back to baseline: No     Activity and level of assistance: SBA     Pain status: Pain free.     Return to near baseline physical activity: Yes          Discharge Planner Nurse   Safe discharge environment identified: No  Barriers to discharge: Yes, Placement    "

## 2019-03-09 NOTE — PROGRESS NOTES
Observation goals PRIOR TO DISCHARGE    Comments:   Safe Dispo : Not met    Psych consult : Met

## 2019-03-09 NOTE — PROGRESS NOTES
CM    I:  IRMA was asked to assist in finding placement for patient. IRMA reviewed notes which appear to indicate a need for placement in a secured Memory Care setting.  SW placed call to [facility] who stated they do not have a LTC bed in their Memory Care, but would consider placing patient into their Memory Care TCU. [facility] is aware patient will be ready for discharge tomorrow morning, after being free of a  for 24 hours.  Private pay down payment would be $6,000.  IRMA was asked to place referral thru [facility], which was done. Will await call back and update family accordingly.    P: Continue to assist as needed.    TOR Allred     UPDATE@[id number]: IRMA also reviewed openings for Residential Care/Memory Care in the [place] area on the [facility]. IRMA placed call to [facility] who stated they do offer short term placements, however their opening was just filled this week.

## 2019-03-10 PROCEDURE — 25000132 ZZH RX MED GY IP 250 OP 250 PS 637: Performed by: PHYSICIAN ASSISTANT

## 2019-03-10 PROCEDURE — G0378 HOSPITAL OBSERVATION PER HR: HCPCS

## 2019-03-10 PROCEDURE — 99225 ZZC SUBSEQUENT OBSERVATION CARE,LEVEL II: CPT | Performed by: PHYSICIAN ASSISTANT

## 2019-03-10 PROCEDURE — 25000132 ZZH RX MED GY IP 250 OP 250 PS 637: Performed by: PSYCHIATRY & NEUROLOGY

## 2019-03-10 PROCEDURE — 99207 ZZC CDG-MDM COMPONENT: MEETS LOW - DOWN CODED: CPT | Performed by: PHYSICIAN ASSISTANT

## 2019-03-10 RX ADMIN — APIXABAN 5 MG: 5 TABLET, FILM COATED ORAL at 10:24

## 2019-03-10 RX ADMIN — OLANZAPINE 2.5 MG: 5 TABLET, FILM COATED ORAL at 20:26

## 2019-03-10 RX ADMIN — APIXABAN 5 MG: 5 TABLET, FILM COATED ORAL at 20:27

## 2019-03-10 RX ADMIN — MELATONIN TAB 3 MG 6 MG: 3 TAB at 20:26

## 2019-03-10 NOTE — PLAN OF CARE
A&O only to self. Confused, agitated at times. Walking in halls, hasn't slept well. Voiding, checking PVR's. Reg diet. Awaiting placement.     Observation goals:     Safe dispo: Not met  Psych consult: met

## 2019-03-10 NOTE — PLAN OF CARE
Alert to self only. VSS on RA. Long arm Sitter utilized. Wandering a lot in the hallway this shift, needs constant redirection from staff. PVR < 499ml.Rex reg diet well. Ambulating in the hallway w/ A1. Scheduled Zyprexa given.Awaiting LTC/resipite placement.

## 2019-03-10 NOTE — PROGRESS NOTES
Observation goals PRIOR TO DISCHARGE    Comments:   Safe Dispo : Not met     Psych consult : Met

## 2019-03-10 NOTE — PLAN OF CARE
Pt is A&O to self . VSS on RA, denies pain.  Independent. Rex reg diet.  Ambulating on unit SBA. Awaiting [facility] memory care placement. SW sent out referrals, waiting for response. Continue to monitor.

## 2019-03-10 NOTE — PLAN OF CARE
Observation goals PRIOR TO DISCHARGE  Safe Dispo: not met  Psych consult: met  Nurse to notify provider when observation goals have been met and patient is ready for discharge.

## 2019-03-10 NOTE — PROGRESS NOTES
[facility]    Medicine Progress Note - Hospitalist Service       Date of Admission:  3/6/2019  Assessment & Plan   Paul Milligan is a 83 year old male with a past medical history significant for atrial fibrillation s/p recent ablation and pacemaker placement, cardiomyopathy, hypertension, history of aspiration pneumonia, and hypertension who presented to the Emergency Department with family for evaluation of confusion and urinary frequency.      Suspected dementia with behavioral disturbance.   Suspected dementia and decline overall since March 2018. Admitted Dec 2018 w/ Afib, AMS and discharged to memory care TCU on scheduled Seroquel. Family reports experience was poor and felt pt was over-sedated even with low doses of Seroquel. He has not yet had formal Neuropsych testing or seen Neurology as he has significant anxiety attending clinic appointments. Currently lives with wife and daughter who report increased difficulty with managing pt as well as increased confusion over past week or so. WBC wnl, afebrile, neg UA and CXR. No other infectious symptoms. Daughter concerned diltiazem may be affecting mental status therefore it was stopped. Family interested in discussion on placement, hoping for [facility] facility if possible. LTC vs memory care TCU may be appropriate while awaiting long [facility] the [facility].      - Psych consulted, appreciate assistance. Started on zyprexa 2.5mg QHS with prn dosing bid for agitation.   - Family does not want Seroquel to be used.   - Neuropsych referral on discharge for formal testing, they are unable to see here in the hospital   - Continue melatonin 6mg Q HS, they report abdominal pain and diarrhea with trazodone use  -  consult for discharge planning, plan for discharge to memory care TCU when placement available, multiple referrals sent without acceptance as of yet, see  for details  - Sitter discontinued 3/9/19 AM     Urinary frequency, improved  UA  lavon. Questioned retention on admission.   - Has been bladder scaning and recording PVR, ranging 150s-400s, no current need for Flomax, urinating appropriately but requires some prompting  - Consider flomax if continued suspicion for possible BPH     Hypertension.   PTA diltiazem stopped on admission as family concerned this is worsening mental status.   - Will watch BP off medications and consider adding additional agent if needed.   - Prn hydralazine if needed   - BP stable thus far off meds - BPs running 100s-140s/60s-80s, hesitate to start any new meds in light of social situation and family felt pt had difficulty with ditiazem  - F/u with PCP for BP checks once he is in a stable living environment     Paroxymal atrial fibrillation s/p ablation and pacemaker placement 2/13/19  EKG showed V-paced rhythm. Metoprolol and diltiazem stopped after procedure; diltiazem added back for elevated BP but now discontinued as above.   ECHO 12/2018: EF 45-50%, possibly tachycardia induced.   - Continue PTA Eliquis      History of aspiration pneumonia.   Followed by home SLP per daughter and on modified thickened liquid diet for a while. Now back on regular diet and daughter has not noticed any symptoms of aspiration recently. She declines speech consult here.   - aspiration precautions     Diet: Regular Diet Adult    DVT Prophylaxis: Eliquis  Petersen Catheter: not present  Code Status: Full Code      Disposition Plan   Expected discharge: as soon as able, medically stable, recommended to memory care transitional care unit once safe disposition plan/ TCU bed available.  Entered: Yanely Sheffield PA-C 03/10/2019, 3:09 PM       The patient's care was discussed with the Attending Physician, Dr. Ibrahim, Bedside Nurse, Care Coordinator/ and Patient.    Yanely Sheffield PA-C  Hospitalist Service  [facility]    ______________________________________________________________________    Interval History    Feeling well, NAD. Ate bfast. AAOx1. No N/V. Calm during my assessment, cooperative.    Data reviewed today: I reviewed all medications, new labs and imaging results over the last 24 hours. I personally reviewed no images or EKG's today.    Physical Exam   Vital Signs: Temp: 96.9  F (36.1  C) Temp src: Oral BP: 141/86 Pulse: 78 Heart Rate: 88 Resp: 16 SpO2: 100 % O2 Device: None (Room air)    Weight: 136 lbs 4.8 oz    General: Awake, alert, pleasantly confused elderly gentleman who appears stated age. Looks comfortable laying on guest bed. No acute distress.  HEENT: Normocephalic, atraumatic. Extraocular movements intact.   Respiratory: Clear to auscultation bilaterally, no rales, wheezing, or rhonchi.  Cardiovascular: Regular rate and rhythm, +S1 and S2, no murmur auscultated. No peripheral edema.   Gastrointestinal: Soft, non-tender, non-distended. Bowel sounds present.  Skin: Warm, dry. No obvious rashes or lesions on exposed skin. Dorsalis pedis pulses palpable bilaterally.  Musculoskeletal: No joint swelling, erythema or tenderness. Moves all extremities equally.  Neurologic: AAO x1-2. Cranial nerves 2-12 grossly intact, normal strength and sensation.  Psychiatric: Flat affect, calm, cooperative. No obvious anxiety.    Data   Recent Labs   Lab 03/07/19  0635 03/06/19  1130   WBC  --  6.2   HGB  --  13.4   MCV  --  97   PLT  --  290    134   POTASSIUM 4.0 4.5   CHLORIDE 103 100   CO2 29 27   BUN 11 15   CR 1.05 0.99   ANIONGAP 4 7   LILIANA 8.7 9.1   GLC 83 99     No results found for this or any previous visit (from the past 24 hour(s)).  Medications       apixaban ANTICOAGULANT  5 mg Oral BID     melatonin  6 mg Oral At Bedtime     OLANZapine zydis  2.5 mg Oral At Bedtime

## 2019-03-10 NOTE — PROGRESS NOTES
Social Work Services Progress Note    Hospital Day: 4 under OBS care  Date of Initial Social Work Evaluation: 3/8/19  Collaborated with: Pt's daughter Velma    Data: Pt is 83 year old male admitted due to dementia. Family is looking to TCU/LTC placement until they can facilitated placement in a memory AL.  Intervention: Velma called back this Am to indicated she did tour [facility] and although she was not thrilled with the location she was willing to have the pt discharge there if they can take the pt. Velma would still like to know if [facility] can take the pt and SW left another message for admissions. This IRMA and Velma spend some time discussing how hard it is for change in dementia patients and that no location for will be perfect for the pt. Discussed ways to help better support the pt until he is able to move into a more comfortable environment.Velma acknowledges that this has been a very hard time for her emotionally and wanting to  Make sure her father is well cared for. Call placed to [facility] to see about bed status. SW did inform Velma that a new SW'er was working with the pt today and Velma was planning to reach out to her as well to discuss discharge plans.  Assessment: Pt is alert, confused  Plan:    Discharge Plans in Progress: To LTC    Barriers to d/c plan: Bed availability    Follow up Plan: SW will continue to follow for discharge planning.       DANYELLE Stein, [facility]  Phone [phone]

## 2019-03-11 ENCOUNTER — MEDICAL CORRESPONDENCE (OUTPATIENT)
Dept: HEALTH INFORMATION MANAGEMENT | Facility: CLINIC | Age: 84
End: 2019-03-11

## 2019-03-11 LAB
CREAT SERPL-MCNC: 1.12 MG/DL (ref 0.66–1.25)
GFR SERPL CREATININE-BSD FRML MDRD: 61 ML/MIN/{1.73_M2}
MDC_IDC_LEAD_IMPLANT_DT: NORMAL
MDC_IDC_LEAD_LOCATION: NORMAL
MDC_IDC_LEAD_LOCATION_DETAIL_1: NORMAL
MDC_IDC_LEAD_MFG: NORMAL
MDC_IDC_LEAD_MODEL: NORMAL
MDC_IDC_LEAD_POLARITY_TYPE: NORMAL
MDC_IDC_LEAD_SERIAL: NORMAL
MDC_IDC_MSMT_BATTERY_REMAINING_LONGEVITY: 120 MO
MDC_IDC_MSMT_BATTERY_STATUS: NORMAL
MDC_IDC_MSMT_LEADCHNL_RV_IMPEDANCE_VALUE: 728 OHM
MDC_IDC_MSMT_LEADCHNL_RV_PACING_THRESHOLD_AMPLITUDE: 0.8 V
MDC_IDC_MSMT_LEADCHNL_RV_PACING_THRESHOLD_PULSEWIDTH: 0.4 MS
MDC_IDC_MSMT_LEADCHNL_RV_SENSING_INTR_AMPL: 16.2 MV
MDC_IDC_PG_IMPLANT_DTM: NORMAL
MDC_IDC_PG_MFG: NORMAL
MDC_IDC_PG_MODEL: NORMAL
MDC_IDC_PG_SERIAL: NORMAL
MDC_IDC_PG_TYPE: NORMAL
MDC_IDC_SESS_CLINIC_NAME: NORMAL
MDC_IDC_SESS_DTM: NORMAL
MDC_IDC_SESS_TYPE: NORMAL
MDC_IDC_SET_BRADY_LOWRATE: 70 {BEATS}/MIN
MDC_IDC_SET_BRADY_MAX_SENSOR_RATE: 120 {BEATS}/MIN
MDC_IDC_SET_BRADY_MODE: NORMAL
MDC_IDC_SET_LEADCHNL_RA_SENSING_ADAPTATION_MODE: NORMAL
MDC_IDC_SET_LEADCHNL_RA_SENSING_ANODE_ELECTRODE_1: NORMAL
MDC_IDC_SET_LEADCHNL_RA_SENSING_ANODE_LOCATION_1: NORMAL
MDC_IDC_SET_LEADCHNL_RA_SENSING_CATHODE_ELECTRODE_1: NORMAL
MDC_IDC_SET_LEADCHNL_RA_SENSING_CATHODE_LOCATION_1: NORMAL
MDC_IDC_SET_LEADCHNL_RA_SENSING_POLARITY: NORMAL
MDC_IDC_SET_LEADCHNL_RA_SENSING_SENSITIVITY: 0.75 MV
MDC_IDC_SET_LEADCHNL_RV_PACING_AMPLITUDE: 3.5 V
MDC_IDC_SET_LEADCHNL_RV_PACING_ANODE_ELECTRODE_1: NORMAL
MDC_IDC_SET_LEADCHNL_RV_PACING_ANODE_LOCATION_1: NORMAL
MDC_IDC_SET_LEADCHNL_RV_PACING_CAPTURE_MODE: NORMAL
MDC_IDC_SET_LEADCHNL_RV_PACING_CATHODE_ELECTRODE_1: NORMAL
MDC_IDC_SET_LEADCHNL_RV_PACING_CATHODE_LOCATION_1: NORMAL
MDC_IDC_SET_LEADCHNL_RV_PACING_POLARITY: NORMAL
MDC_IDC_SET_LEADCHNL_RV_PACING_PULSEWIDTH: 0.4 MS
MDC_IDC_SET_LEADCHNL_RV_SENSING_ADAPTATION_MODE: NORMAL
MDC_IDC_SET_LEADCHNL_RV_SENSING_ANODE_ELECTRODE_1: NORMAL
MDC_IDC_SET_LEADCHNL_RV_SENSING_ANODE_LOCATION_1: NORMAL
MDC_IDC_SET_LEADCHNL_RV_SENSING_CATHODE_ELECTRODE_1: NORMAL
MDC_IDC_SET_LEADCHNL_RV_SENSING_CATHODE_LOCATION_1: NORMAL
MDC_IDC_SET_LEADCHNL_RV_SENSING_POLARITY: NORMAL
MDC_IDC_SET_LEADCHNL_RV_SENSING_SENSITIVITY: 2.5 MV
MDC_IDC_SET_ZONE_DETECTION_INTERVAL: 375 MS
MDC_IDC_SET_ZONE_TYPE: NORMAL
MDC_IDC_SET_ZONE_VENDOR_TYPE: NORMAL
MDC_IDC_STAT_EPISODE_RECENT_COUNT: 0
MDC_IDC_STAT_EPISODE_RECENT_COUNT_DTM_END: NORMAL
MDC_IDC_STAT_EPISODE_RECENT_COUNT_DTM_START: NORMAL
MDC_IDC_STAT_EPISODE_TOTAL_COUNT: 0
MDC_IDC_STAT_EPISODE_TOTAL_COUNT_DTM_END: NORMAL
MDC_IDC_STAT_EPISODE_TYPE: NORMAL
MDC_IDC_STAT_EPISODE_TYPE: NORMAL
MDC_IDC_STAT_EPISODE_VENDOR_TYPE: NORMAL
MDC_IDC_STAT_EPISODE_VENDOR_TYPE: NORMAL

## 2019-03-11 PROCEDURE — 25000132 ZZH RX MED GY IP 250 OP 250 PS 637: Performed by: PSYCHIATRY & NEUROLOGY

## 2019-03-11 PROCEDURE — 82565 ASSAY OF CREATININE: CPT | Performed by: INTERNAL MEDICINE

## 2019-03-11 PROCEDURE — 36415 COLL VENOUS BLD VENIPUNCTURE: CPT | Performed by: INTERNAL MEDICINE

## 2019-03-11 PROCEDURE — 25000132 ZZH RX MED GY IP 250 OP 250 PS 637: Performed by: PHYSICIAN ASSISTANT

## 2019-03-11 PROCEDURE — 99224 ZZC SUBSEQUENT OBSERVATION CARE,LEVEL I: CPT | Performed by: PHYSICIAN ASSISTANT

## 2019-03-11 PROCEDURE — G0378 HOSPITAL OBSERVATION PER HR: HCPCS

## 2019-03-11 RX ADMIN — OLANZAPINE 2.5 MG: 5 TABLET, FILM COATED ORAL at 22:57

## 2019-03-11 RX ADMIN — MELATONIN TAB 3 MG 6 MG: 3 TAB at 22:53

## 2019-03-11 RX ADMIN — APIXABAN 5 MG: 5 TABLET, FILM COATED ORAL at 09:26

## 2019-03-11 RX ADMIN — OLANZAPINE 2.5 MG: 5 TABLET, ORALLY DISINTEGRATING ORAL at 19:51

## 2019-03-11 RX ADMIN — APIXABAN 5 MG: 5 TABLET, FILM COATED ORAL at 19:51

## 2019-03-11 NOTE — PLAN OF CARE
Observation goals PRIOR TO DISCHARGE     Comments: Safe Dispo - not met  Psych consult - met  Nurse to notify provider when observation goals have been met and patient is ready for discharge.

## 2019-03-11 NOTE — PROGRESS NOTES
Alert to self, VSS on RA, awaiting LTC placement, denies pain, incontinent, no IV site, continue to monitor

## 2019-03-11 NOTE — PLAN OF CARE
"PRIMARY DIAGNOSIS: \"GENERIC\" NURSING  OUTPATIENT/OBSERVATION GOALS TO BE MET BEFORE DISCHARGE:  ADLs back to baseline: Yes    Activity and level of assistance: Up with standby assistance.    Pain status: Pain free.    Return to near baseline physical activity: Yes     Discharge Planner Nurse   Safe discharge environment identified: No  Barriers to discharge: Yes       Entered by: Perla Coombs 03/11/2019 6:27 PM     Please review provider order for any additional goals.   Nurse to notify provider when observation goals have been met and patient is ready for discharge.  "

## 2019-03-11 NOTE — PROGRESS NOTES
[facility]    Medicine Progress Note - Hospitalist Service       Date of Admission:  3/6/2019    Assessment & Plan   Paul Milligan is a 83 year old male with a past medical history significant for atrial fibrillation s/p recent ablation and pacemaker placement, cardiomyopathy, hypertension, history of aspiration pneumonia, and hypertension who presented to the Emergency Department with family for evaluation of confusion and urinary frequency.      Suspected dementia with behavioral disturbance.   Suspected dementia and decline overall since March 2018. Admitted Dec 2018 w/ Afib, AMS and discharged to memory care TCU on scheduled Seroquel. Family reports experience was poor and felt pt was over-sedated even with low doses of Seroquel. He has not yet had formal Neuropsych testing or seen Neurology as he has significant anxiety attending clinic appointments. Currently lives with wife and daughter who report increased difficulty with managing pt as well as increased confusion over 1-2 weeks PTA. WBC wnl, afebrile, neg UA and CXR. No other infectious symptoms. Daughter concerned diltiazem may be affecting mental status therefore it was stopped. Family interested in discussion on placement, [facility]. LTC vs memory care TCU may be appropriate while awaiting long [facility] the [facility].   - Psych consulted, appreciate assistance. Started on zyprexa 2.5mg QHS with prn dosing bid for agitation.   - Family does not want Seroquel to be used.   - Neuropsych referral on discharge for formal testing, they are unable to see here in the hospital   - Continue melatonin 6mg Q HS, they report abdominal pain and diarrhea with trazodone use  -  consult for discharge planning, plan for discharge to memory care TCU when placement available, multiple referrals sent without acceptance as of yet, see  for details  - Sitter discontinued 3/9/19 AM     Urinary frequency, improved  UA bland.  Questioned retention on admission.   - Has been bladder scaning and recording PVR, ranging 150s-400s, no current need for Flomax, urinating appropriately but requires some prompting  - Consider flomax if continued suspicion for possible BPH     Hypertension.   PTA diltiazem stopped on admission as family concerned this is worsening mental status.   - BPs remain controlled while off medication, continue to monitor  - Prn hydralazine if needed   - F/u with PCP for BP checks once he is in a stable living environment     Paroxymal atrial fibrillation s/p ablation and pacemaker placement 2/13/19  Metoprolol and diltiazem stopped after procedure; diltiazem added back for elevated BP but now discontinued as above. TTE 12/2018: EF 45-50%, possibly tachycardia induced.   - Continue PTA Eliquis      History of aspiration pneumonia.   Followed by home SLP per daughter and on modified thickened liquid diet for a while. Now back on regular diet and daughter has not noticed any symptoms of aspiration recently. She declines speech consult here.   - aspiration precautions     Diet: Regular Diet Adult    DVT Prophylaxis: Ambulate every shift  Petersen Catheter: not present  Code Status: Full Code      Disposition Plan   Expected discharge: Today or Tomorrow, recommended to transitional care unit once safe disposition plan/ TCU bed available.    Entered: Pablo Maxwell PA-C 03/11/2019, 2:29 PM       The patient's care was discussed with the Attending Physician, Dr. Singh.    Pablo Maxwell PA-C  Hospitalist Service  [facility]    ______________________________________________________________________    Interval History   Pt seen & evaluated with daughters, walking around unit without difficulty. No cp, sob. No fever. Awaiting placement.    Data reviewed today: I reviewed all medications, new labs and imaging results over the last 24 hours. I personally reviewed no images or EKG's today.    Physical Exam   Vital Signs:  Temp: 96.4  F (35.8  C) Temp src: Oral BP: 128/76   Heart Rate: 75 Resp: 16 SpO2: 97 % O2 Device: None (Room air)    Weight: 136 lbs 4.8 oz  Constitutional: awake, alert, cooperative, no apparent distress, and appears stated age  Eyes: Lids and lashes normal, pupils equal, round and reactive to light, extra ocular muscles intact, sclera clear, conjunctiva normal  Respiratory: No increased work of breathing  Skin: no bruising or bleeding, normal skin color, texture, turgor and no redness, warmth, or swelling  Musculoskeletal: There is no redness, warmth, or swelling of the joints.  Full range of motion noted.  Motor strength is 5 out of 5 all extremities bilaterally.  Tone is normal.    Data   Recent Labs   Lab 03/11/19  1011 03/07/19  0635 03/06/19  1130   WBC  --   --  6.2   HGB  --   --  13.4   MCV  --   --  97   PLT  --   --  290   NA  --  136 134   POTASSIUM  --  4.0 4.5   CHLORIDE  --  103 100   CO2  --  29 27   BUN  --  11 15   CR 1.12 1.05 0.99   ANIONGAP  --  4 7   LILIANA  --  8.7 9.1   GLC  --  83 99

## 2019-03-11 NOTE — PLAN OF CARE
Alert to self only. VSS on RA.Rex reg diet well. PVR< 350.Ambulating in the hallway w/ SBA. Scheduled Zyprexa given.Awaiting LTC/resipite placement.

## 2019-03-11 NOTE — PLAN OF CARE
Alert to self only. Refused vital signs. No s/s pain. Ambulating in hallway SBA. Daughters at bedside. Door alarm to be activated if no family in room. Awaiting LTC placement, SW helping w/ discharge. Continue to monitor.

## 2019-03-11 NOTE — PROGRESS NOTES
IRMA:    0850: IRMA spoke to Vivi at [facility] who reports they have a male bed avail in their memory care unit. Vivi to review and get back to this writer. Vivi later called to inform that they do not feel that they can meet patient needs. The room available is near the door which is not secured.  0940: IRMA spoke to floyd Carreon and Kirti, providing updates on referral status.   0950: SW left VM for Jonatan in admissions at [facility]- Jonatan later called to inform that they do not have a secured bed avail.  1020: SW returned call to Nancie at [facility] who inform that they have no open beds. SW provided the name and ph # of dtr Velma for the facility to call once bed is avail.  1025: IRMA revieced call from dtr Kirti who shared that she became informed about a new [facility] in [place], of which is not yet open although has respite rooms avail. LM for Elaina in adm's at: [phone].  1530: IRMA called again and directed to Urban in administration at [facility] in [place] who stated their respite bed is not available for another 60 days.  1535: SW spoke to Camelia at '[facility]' who indicates they have a male bed at [facility] and will review patient referral. -Camelia later called to say the bed she thought was available no longer is.  1600: IRMA spoke to admissions at [facility] who informs they have a private and semi private in their  unit and will assess. Private room fee=$47 which family states would be their preference.   1700: Additional referrals have been sent to:   -[facility]  -[facility]

## 2019-03-12 VITALS
HEART RATE: 76 BPM | SYSTOLIC BLOOD PRESSURE: 132 MMHG | OXYGEN SATURATION: 100 % | DIASTOLIC BLOOD PRESSURE: 76 MMHG | WEIGHT: 136.3 LBS | BODY MASS INDEX: 17.5 KG/M2 | RESPIRATION RATE: 16 BRPM | TEMPERATURE: 96.3 F

## 2019-03-12 PROCEDURE — 25000132 ZZH RX MED GY IP 250 OP 250 PS 637: Performed by: PHYSICIAN ASSISTANT

## 2019-03-12 PROCEDURE — G0378 HOSPITAL OBSERVATION PER HR: HCPCS

## 2019-03-12 PROCEDURE — 99217 ZZC OBSERVATION CARE DISCHARGE: CPT | Performed by: PHYSICIAN ASSISTANT

## 2019-03-12 RX ORDER — OLANZAPINE 5 MG/1
TABLET, ORALLY DISINTEGRATING ORAL
Qty: 3 TABLET | Refills: 0 | Status: SHIPPED | OUTPATIENT
Start: 2019-03-12

## 2019-03-12 RX ORDER — TAMSULOSIN HYDROCHLORIDE 0.4 MG/1
0.4 CAPSULE ORAL DAILY
DISCHARGE
Start: 2019-03-13

## 2019-03-12 RX ORDER — ACETAMINOPHEN 325 MG/1
650 TABLET ORAL EVERY 4 HOURS PRN
DISCHARGE
Start: 2019-03-12

## 2019-03-12 RX ORDER — OLANZAPINE 5 MG/1
TABLET, ORALLY DISINTEGRATING ORAL
Qty: 5 TABLET | Refills: 0 | Status: SHIPPED | OUTPATIENT
Start: 2019-03-12

## 2019-03-12 RX ORDER — TAMSULOSIN HYDROCHLORIDE 0.4 MG/1
0.4 CAPSULE ORAL DAILY
Status: DISCONTINUED | OUTPATIENT
Start: 2019-03-12 | End: 2019-03-12 | Stop reason: HOSPADM

## 2019-03-12 RX ADMIN — TAMSULOSIN HYDROCHLORIDE 0.4 MG: 0.4 CAPSULE ORAL at 10:39

## 2019-03-12 RX ADMIN — APIXABAN 5 MG: 5 TABLET, FILM COATED ORAL at 08:55

## 2019-03-12 NOTE — PLAN OF CARE
Pt oriented to self only. VSS on RA. No s/s pain. Rex reg diet. Ambulates often in halls. Daughter at bedside. Pt discharging via HE to [facility]. Care plan reviewed w/ daughter who voices no further questions/concerns. Report given to HE drivers.

## 2019-03-12 NOTE — DISCHARGE SUMMARY
[facility]    Discharge Summary  Hospitalist    Date of Admission:  3/6/2019  Date of Discharge:  3/12/2019  Discharging Provider: Pablo Maxwell PA-C    Discharge Diagnoses   Altered mental status, unspecified altered mental status type    History of Present Illness   Paul Milligan is an 83 year old male who presented with confusion and urinary frequency    HPI from admission H&P:  Paul Milligan is a 83 year old male with a past medical history significant for atrial fibrillation s/p recent ablation and pacemaker placement, cardiomyopathy, hypertension, history of aspiration pneumonia, and hypertension who presented to the Emergency Department with family for evaluation of confusion and urinary frequency.  Patient's daughter provides most of the history due to the patient's dementia.  The patient currently resides at home with his wife and his daughter who is his primary caregiver.  The patient was admitted to [facility] back in December after presenting with altered mental status and new onset atrial fibrillation.  Prior to this visit the patient had not doctored in with the daughter believes was likely over 20 years.  During his admission he was followed by psychiatry and started on scheduled Seroquel and trazodone.  He was also evaluated by cardiology for his new onset atrial fibrillation which was managed medically with metoprolol and diltiazem, and anticoagulation.  The patient remained in the hospital for 7 days and ultimately discharged to memory care TCU.  His daughter reports he had a poor experience at TCU and she felt that the patient was poorly tended to and over sedated with Seroquel.  They remain there for approximately 5 weeks before returning home.  The patient was having continued issues with rate control and ultimately underwent pacemaker placement on February 13.  After the procedure she was told to stop his metoprolol and diltiazem.  She reports she monitors  "his blood pressure at home and found it to be persistently elevated and after speaking to the cardiology clinic restarted the diltiazem.  She reports particularly over the past week or so the patient seems to be increasingly confused.  Her and her mother had wondered whether it may be related to the diltiazem and stopped this for a brief time during which his blood pressure became significantly elevated.  She also notes that last night he got up to use the bathroom 8 times and has not been sleeping very well.  Ultimately she was concerned he may have an infection.  She also notes that she herself has lost a significant amount of weight and has had difficulty caring for the patient 24/7.  The patient's wife and daughter are therefore interested in discussing possible placement options.  She is not believe that her father has had recent fevers or chills.  She has not noticed any cough or obvious shortness of breath.  She notes he has been sore since his procedure and occasionally has reported \"not feeling well \".  Patient is presently evaluated in the observation unit.  He has no complaints at this time.  He denies any pain.  He denies nausea, vomiting, dizziness.  He is oriented to self only at the time of my exam.    Hospital Course   Paul Milligan was admitted on 3/6/2019.  The following problems were addressed during his hospitalization:     Suspected dementia with behavioral disturbance.   Suspected dementia and decline overall since March 2018. Admitted Dec 2018 w/ Afib, AMS and discharged to memory care TCU on scheduled Seroquel. Family reports experience was poor and felt pt was over-sedated even with low doses of Seroquel. He has not yet had formal Neuropsych testing or seen Neurology as he has significant anxiety attending clinic appointments. Currently lives with wife and daughter who report increased difficulty with managing pt as well as increased confusion over 1-2 weeks PTA. WBC wnl, afebrile, neg UA " and CXR. No other infectious symptoms. Daughter concerned diltiazem may be affecting mental status therefore it was stopped.   - Psych consulted, appreciate assistance. Started on zyprexa 2.5mg QHS with prn dosing bid for agitation.   - Family does not want Seroquel to be used.   - Neuropsych referral on discharge for formal testing, they are unable to see here in the hospital   - Continue melatonin 6mg Q HS, they report abdominal pain and diarrhea with trazodone use  - SW assisted in placement; family has procured memory care facility for patient where he will be able to move in 3/25, until that time he will require TCU placement     Urinary frequency, improved  UA bland. Questioned retention on admission. Has been bladder scaning and recording PVR, ranging 150s-400s, no current need for Flomax, urinating appropriately but requires some prompting  - Started flomax 0.4mg with improvement     Hypertension.   PTA diltiazem stopped on admission as family concerned this is worsening mental status.   - BPs remain controlled while off medication, continue to monitor  - Prn hydralazine if needed   - F/u with PCP for BP checks once he is in a stable living environment     Paroxymal atrial fibrillation s/p ablation and pacemaker placement 2/13/19  Metoprolol and diltiazem stopped after procedure; diltiazem added back for elevated BP but now discontinued as above. TTE 12/2018: EF 45-50%, possibly tachycardia induced.   - Continue PTA Eliquis      History of aspiration pneumonia.   Followed by home SLP per daughter and on modified thickened liquid diet for a while. Now back on regular diet and daughter has not noticed any symptoms of aspiration recently. She declines speech consult here.   - aspiration precautions     Pablo Maxwell PA-C    Significant Results and Procedures   As noted    Pending Results   These results will be followed up by n/a  Unresulted Labs Ordered in the Past 30 Days of this Admission     No orders  found from 1/5/2019 to 3/7/2019.          Code Status   DNR / DNI       Primary Care Physician   Ismael العلي    Physical Exam   Temp: 96.3  F (35.7  C) Temp src: Axillary BP: 132/76 Pulse: 76 Heart Rate: 83 Resp: 16 SpO2: 100 % O2 Device: None (Room air)    Vitals:    03/08/19 0652   Weight: 61.8 kg (136 lb 4.8 oz)     Vital Signs with Ranges  Temp:  [96.1  F (35.6  C)-97.4  F (36.3  C)] 96.3  F (35.7  C)  Pulse:  [76] 76  Heart Rate:  [77-83] 83  Resp:  [16] 16  BP: (116-148)/(70-86) 132/76  SpO2:  [97 %-100 %] 100 %  I/O last 3 completed shifts:  In: 570 [P.O.:570]  Out: 200 [Urine:200]    GEN: well-developed, thin elderly male, appears comfortable  PULM: respirations nonlabored, adequate air movement  SKIN: warm & dry without rash, wound, or pedal edema, no bruising or bleeding    Discharge Disposition   Discharged to rehabilitation facility  Condition at discharge: Stable    Consultations This Hospital Stay   PSYCHIATRY IP CONSULT  SOCIAL WORK IP CONSULT  NEUROPSYCHOLOGY IP CONSULT    Time Spent on this Encounter   I, Pablo Maxwell, personally saw the patient today and spent less than or equal to 30 minutes discharging this patient.    Discharge Orders      Additional Discharge Instructions    [facility] [place] [facility] [place] [address]) has been arranged for you to establish care if you would like. The appointment is for Friday, March 22nd at 9 am with Dr. Ware. A welcome information packet will be mailed to your home for your review.     Discharge Medications   Current Discharge Medication List      CONTINUE these medications which have NOT CHANGED    Details   apixaban ANTICOAGULANT (ELIQUIS) 5 MG tablet Take 1 tablet (5 mg) by mouth 2 times daily  Qty: 180 tablet, Refills: 3    Associated Diagnoses: Atrial fibrillation (H)      calcium carbonate-vitamin D (OS-LILIANA) 500-400 MG-UNIT tablet Take 1 tablet by mouth daily      COENZYME Q10 PO Take 1 tablet by mouth daily       diltiazem ER (DILT-XR) 120 MG 24 hr capsule Take 1 capsule (120 mg) by mouth daily  Qty: 90 capsule, Refills: 3    Associated Diagnoses: Atrial fibrillation (H)      fish oil-omega-3 fatty acids 1000 MG capsule Take 1 g by mouth daily      LORazepam (ATIVAN) 0.5 MG tablet Take 0.25-0.5 mg by mouth daily as needed for anxiety      Magnesium Oxide 250 MG TABS Take 1 tablet by mouth daily      melatonin 3 MG tablet Take 6 mg by mouth At Bedtime      vitamin C (ASCORBIC ACID) 500 MG tablet Take 500 mg by mouth daily      Vitamin D, Cholecalciferol, 1000 units TABS Take 1 tablet by mouth daily      VITAMIN E PO Take 1 tablet by mouth daily      zinc gluconate 50 MG tablet Take 50 mg by mouth daily           Allergies   Allergies   Allergen Reactions     No Known Allergies      Data   Most Recent 3 CBC's:  Recent Labs   Lab Test 03/06/19  1130 02/13/19  0715 01/02/19   WBC 6.2 5.2 4.9   HGB 13.4 13.6 12.4*   MCV 97 98 101*    251 179      Most Recent 3 BMP's:  Recent Labs   Lab Test 03/11/19  1011 03/07/19  0635 03/06/19  1130 02/13/19  0715   NA  --  136 134 136   POTASSIUM  --  4.0 4.5 4.1   CHLORIDE  --  103 100 102   CO2  --  29 27 29   BUN  --  11 15 12   CR 1.12 1.05 0.99 1.06   ANIONGAP  --  4 7 5   LILIANA  --  8.7 9.1 9.2   GLC  --  83 99 80     Most Recent 2 LFT's:No lab results found.  Most Recent INR's and Anticoagulation Dosing History:  Anticoagulation Dose History     There is no flowsheet data to display.        Most Recent 3 Troponin's:  Recent Labs   Lab Test 12/21/18  2255 12/21/18  1644 12/21/18  1050   TROPI 0.015 0.018 0.016     Most Recent Cholesterol Panel:No lab results found.  Most Recent 6 Bacteria Isolates From Any Culture (See EPIC Reports for Culture Details):No lab results found.  Most Recent TSH, T4 and A1c Labs:  Recent Labs   Lab Test 12/21/18  1644   TSH 2.78     Results for orders placed or performed during the hospital encounter of 03/06/19   XR Chest 2 Views    Narrative     CHEST TWO VIEWS  3/6/2019 11:49 AM     HISTORY:  Altered mental status.    COMPARISON: 2/4/2019.      Impression    IMPRESSION: No significant interval change. Single-lead cardiac device  right chest wall, with lead tip projected over the RV. Lungs clear. No  pneumothorax. No pleural effusions are identified, however the  posterior costophrenic angles are not entirely included on this exam.  Heart size appears stable. Pulmonary vascularity is within normal  limits. Calcified tortuous thoracic aorta.    DESI BURGESS MD   Head CT w/o contrast    Narrative    CT SCAN OF THE HEAD WITHOUT CONTRAST   3/6/2019 1:31 PM     HISTORY: Altered level of consciousness.    TECHNIQUE:  Axial images of the head and coronal reformations without  IV contrast material. Radiation dose for this scan was reduced using  automated exposure control, adjustment of the mA and/or kV according  to patient size, or iterative reconstruction technique.    COMPARISON: Head CT 12/27/2018.    FINDINGS: There is no evidence of intracranial hemorrhage, mass, acute  infarct or anomaly. There is generalized atrophy of the brain. There  is low attenuation in the white matter of the cerebral hemispheres  consistent with sequelae of small vessel ischemic disease. Ventricular  size is within normal limits without evidence of hydrocephalus.     The visualized portions of the sinuses and mastoids appear normal. The  bony calvarium and bones of the skull base appear intact.       Impression    IMPRESSION:     1. No evidence of acute intracranial hemorrhage, mass, or herniation.  2. There is generalized atrophy of the brain. White matter changes are  present in the cerebral hemispheres that are consistent with small  vessel ischemic disease in this age patient.    SUNNY SR MD

## 2019-03-12 NOTE — PROGRESS NOTES
Updated today with LTC referrals.  9:31 AM  03/12/19    ADDENDUM:   1030: [facility] returned call, indicating they do not provide respite care.  1032: Additional respite referrals sent.  12:29:   -Patient accepted at [facility] into a private room.  -Family agrees to pay $3500 down and $47/day for private room fee.   -IRMA provided dtr Velma with printed out directions to facility  -IRMA informed Elaina at [facility] that patient may require a bed extender due to his height.  -Per family choice, [facility] arranged ride via [employer] at 1700.    DC orders:  Scripts: sent via [facility]  Trans: [employer] @ 1700    IRMA to continue to follow and assist with discharge planning.

## 2019-03-12 NOTE — PLAN OF CARE
Alert, oriented to self, SBA, long arm sit present, incontinent at times, VSS, door alarm in place, denies pain, zyprexa given for agitation, pt declining PVR, SW following for placement.

## 2019-03-12 NOTE — PLAN OF CARE
"PRIMARY DIAGNOSIS: \"GENERIC\" NURSING  OUTPATIENT/OBSERVATION GOALS TO BE MET BEFORE DISCHARGE:  1. ADLs back to baseline: Met    2. Activity and level of assistance: Up with standby assist.    3. Pain status: Denies pain.    4. Return to near baseline physical activity: Yes     Discharge Planner Nurse   Safe discharge environment identified: No  Barriers to discharge: Yes       Entered by: Dean Dennis 03/12/2019 12:26 AM     Please review provider order for any additional goals.   Nurse to notify provider when observation goals have been met and patient is ready for discharge.  "

## 2019-03-12 NOTE — PLAN OF CARE
"PRIMARY DIAGNOSIS: \"GENERIC\" NURSING  OUTPATIENT/OBSERVATION GOALS TO BE MET BEFORE DISCHARGE:  1. ADLs back to baseline: Met    2. Activity and level of assistance: Up with standby assist.    3. Pain status: Denies pain.    4. Return to near baseline physical activity: Yes     Discharge Planner Nurse   Safe discharge environment identified: No  Barriers to discharge: Yes       Entered by: Dean Dennis 03/12/2019 4:01 AM     Please review provider order for any additional goals.   Nurse to notify provider when observation goals have been met and patient is ready for discharge.  "

## 2019-03-12 NOTE — PLAN OF CARE
Pt is alert and oriented to self. VSS on RA, denied any pain. Up with SBA, ambulating on the olivares x2 this shift and getting agitated and screaming that he wants to get out, gets agitated easily, long arm sitter continue to follow him around on the unit.Attempt to give PRN Zyprexa  at 0630 was unsuccessful and pt was left alone when he started getting more agitated and upset. Door alarm on but no bed alarm because of bed extension that is in place.Refusing bladder scan.SW still working on getting placement, have sent several referrals but pt declined at several facilities, still waiting for response from others.

## 2019-03-12 NOTE — PLAN OF CARE
"PRIMARY DIAGNOSIS: \"GENERIC\" NURSING  OUTPATIENT/OBSERVATION GOALS TO BE MET BEFORE DISCHARGE:  ADLs back to baseline: No    Activity and level of assistance: Up with standby assistance.    Pain status: Pain free.    Return to near baseline physical activity: Yes     Discharge Planner Nurse   Safe discharge environment identified: No  Barriers to discharge: Yes       Entered by: Perla Coombs 03/11/2019 10:04 PM     Please review provider order for any additional goals.   Nurse to notify provider when observation goals have been met and patient is ready for discharge.  "

## 2019-03-14 ENCOUNTER — DOCUMENTATION ONLY (OUTPATIENT)
Dept: OTHER | Facility: CLINIC | Age: 84
End: 2019-03-14

## 2019-03-18 ENCOUNTER — MEDICAL CORRESPONDENCE (OUTPATIENT)
Dept: HEALTH INFORMATION MANAGEMENT | Facility: CLINIC | Age: 84
End: 2019-03-18

## 2019-03-27 ENCOUNTER — MEDICAL CORRESPONDENCE (OUTPATIENT)
Dept: HEALTH INFORMATION MANAGEMENT | Facility: CLINIC | Age: 84
End: 2019-03-27

## 2019-04-11 ENCOUNTER — MEDICAL CORRESPONDENCE (OUTPATIENT)
Dept: HEALTH INFORMATION MANAGEMENT | Facility: CLINIC | Age: 84
End: 2019-04-11

## 2019-05-31 ENCOUNTER — ANCILLARY PROCEDURE (OUTPATIENT)
Dept: CARDIOLOGY | Facility: CLINIC | Age: 84
End: 2019-05-31
Attending: INTERNAL MEDICINE
Payer: COMMERCIAL

## 2019-05-31 ENCOUNTER — DOCUMENTATION ONLY (OUTPATIENT)
Dept: CARDIOLOGY | Facility: CLINIC | Age: 84
End: 2019-05-31

## 2019-05-31 DIAGNOSIS — I49.5 SSS (SICK SINUS SYNDROME) (H): Primary | ICD-10-CM

## 2019-05-31 DIAGNOSIS — Z95.0 CARDIAC PACEMAKER IN SITU: ICD-10-CM

## 2019-05-31 PROCEDURE — 93279 PRGRMG DEV EVAL PM/LDLS PM: CPT | Performed by: INTERNAL MEDICINE

## 2019-05-31 NOTE — TELEPHONE ENCOUNTER
Pt is s/p AVNA and PPM on 2/13/19.  He came into clinic for his device check.   has decreased from 94% on 2/21/19 to 62% today.  2 NSVT episodes were  logged between 0452 and 0453 on 5/4/19.  EGM's show irregular VS rhythm suggestive of RVR in the 160-170's.  Pt was unable to recall any symptoms at time of events.  Overall histogram show HR between 70-90bpm over 90% of the time.  Pt is scheduled for a remote follow-up on 9/9/19 and is overdue to see an USAMA in clinic for post-PPM follow-up.  Pt has hx of dementia and pt's daughter, Kirti, was present for visit.  She stated pt is DNR/DNI at this time and would prefer to limit follow-up visits as much as possible and will call scheduling if she decides to pursue follow-up.      Will route to Dr. Che for review and further recommendations.    VEDA You

## 2019-06-03 LAB
MDC_IDC_LEAD_IMPLANT_DT: NORMAL
MDC_IDC_LEAD_LOCATION: NORMAL
MDC_IDC_LEAD_LOCATION_DETAIL_1: NORMAL
MDC_IDC_LEAD_MFG: NORMAL
MDC_IDC_LEAD_MODEL: NORMAL
MDC_IDC_LEAD_POLARITY_TYPE: NORMAL
MDC_IDC_LEAD_SERIAL: NORMAL
MDC_IDC_MSMT_BATTERY_STATUS: NORMAL
MDC_IDC_MSMT_LEADCHNL_RV_IMPEDANCE_VALUE: 740 OHM
MDC_IDC_MSMT_LEADCHNL_RV_PACING_THRESHOLD_AMPLITUDE: 0.5 V
MDC_IDC_MSMT_LEADCHNL_RV_PACING_THRESHOLD_PULSEWIDTH: 0.4 MS
MDC_IDC_MSMT_LEADCHNL_RV_SENSING_INTR_AMPL: 13.5 MV
MDC_IDC_PG_IMPLANT_DTM: NORMAL
MDC_IDC_PG_MFG: NORMAL
MDC_IDC_PG_MODEL: NORMAL
MDC_IDC_PG_SERIAL: NORMAL
MDC_IDC_PG_TYPE: NORMAL
MDC_IDC_SESS_CLINIC_NAME: NORMAL
MDC_IDC_SESS_DTM: NORMAL
MDC_IDC_SESS_TYPE: NORMAL
MDC_IDC_SET_BRADY_LOWRATE: 70 {BEATS}/MIN
MDC_IDC_SET_BRADY_MAX_SENSOR_RATE: 120 {BEATS}/MIN
MDC_IDC_SET_BRADY_MODE: NORMAL
MDC_IDC_SET_LEADCHNL_RA_SENSING_ADAPTATION_MODE: NORMAL
MDC_IDC_SET_LEADCHNL_RA_SENSING_ANODE_ELECTRODE_1: NORMAL
MDC_IDC_SET_LEADCHNL_RA_SENSING_ANODE_LOCATION_1: NORMAL
MDC_IDC_SET_LEADCHNL_RA_SENSING_CATHODE_ELECTRODE_1: NORMAL
MDC_IDC_SET_LEADCHNL_RA_SENSING_CATHODE_LOCATION_1: NORMAL
MDC_IDC_SET_LEADCHNL_RA_SENSING_POLARITY: NORMAL
MDC_IDC_SET_LEADCHNL_RA_SENSING_SENSITIVITY: 0.75 MV
MDC_IDC_SET_LEADCHNL_RV_PACING_AMPLITUDE: 1.1 V
MDC_IDC_SET_LEADCHNL_RV_PACING_ANODE_ELECTRODE_1: NORMAL
MDC_IDC_SET_LEADCHNL_RV_PACING_ANODE_LOCATION_1: NORMAL
MDC_IDC_SET_LEADCHNL_RV_PACING_CAPTURE_MODE: NORMAL
MDC_IDC_SET_LEADCHNL_RV_PACING_CATHODE_ELECTRODE_1: NORMAL
MDC_IDC_SET_LEADCHNL_RV_PACING_CATHODE_LOCATION_1: NORMAL
MDC_IDC_SET_LEADCHNL_RV_PACING_POLARITY: NORMAL
MDC_IDC_SET_LEADCHNL_RV_PACING_PULSEWIDTH: 0.4 MS
MDC_IDC_SET_LEADCHNL_RV_SENSING_ADAPTATION_MODE: NORMAL
MDC_IDC_SET_LEADCHNL_RV_SENSING_ANODE_ELECTRODE_1: NORMAL
MDC_IDC_SET_LEADCHNL_RV_SENSING_ANODE_LOCATION_1: NORMAL
MDC_IDC_SET_LEADCHNL_RV_SENSING_CATHODE_ELECTRODE_1: NORMAL
MDC_IDC_SET_LEADCHNL_RV_SENSING_CATHODE_LOCATION_1: NORMAL
MDC_IDC_SET_LEADCHNL_RV_SENSING_POLARITY: NORMAL
MDC_IDC_SET_LEADCHNL_RV_SENSING_SENSITIVITY: 2.5 MV
MDC_IDC_SET_ZONE_DETECTION_INTERVAL: 375 MS
MDC_IDC_SET_ZONE_TYPE: NORMAL
MDC_IDC_SET_ZONE_VENDOR_TYPE: NORMAL
MDC_IDC_STAT_EPISODE_RECENT_COUNT: 2
MDC_IDC_STAT_EPISODE_RECENT_COUNT_DTM_END: NORMAL
MDC_IDC_STAT_EPISODE_RECENT_COUNT_DTM_START: NORMAL
MDC_IDC_STAT_EPISODE_TOTAL_COUNT: 2
MDC_IDC_STAT_EPISODE_TOTAL_COUNT_DTM_END: NORMAL
MDC_IDC_STAT_EPISODE_TYPE: NORMAL
MDC_IDC_STAT_EPISODE_TYPE: NORMAL
MDC_IDC_STAT_EPISODE_VENDOR_TYPE: NORMAL
MDC_IDC_STAT_EPISODE_VENDOR_TYPE: NORMAL

## 2019-06-06 ENCOUNTER — DOCUMENTATION ONLY (OUTPATIENT)
Dept: OTHER | Facility: CLINIC | Age: 84
End: 2019-06-06

## 2019-06-06 ENCOUNTER — AMBULATORY - HEALTHEAST (OUTPATIENT)
Dept: OTHER | Facility: CLINIC | Age: 84
End: 2019-06-06

## 2019-06-07 ENCOUNTER — MEDICAL CORRESPONDENCE (OUTPATIENT)
Dept: HEALTH INFORMATION MANAGEMENT | Facility: CLINIC | Age: 84
End: 2019-06-07

## (undated) DEVICE — CATH EP 110CM 7FR BLZR2 HTD 2

## (undated) DEVICE — GUIDEWIRE VASC 0.035INX150CM INQWIRE J TIP IQ35F150J3F/A

## (undated) DEVICE — SHEATH PRELUDE SNAP 13CM 6FR

## (undated) DEVICE — DEFIB PRO-PADZ LVP LQD GEL ADULT 8900-2105-01

## (undated) DEVICE — PACK EP SRG PROC LF DISP SAN32EPFSR

## (undated) DEVICE — INTRO SFSHEATH WORLEY 40CM 9FR S CSGWORLEY109M

## (undated) DEVICE — PACK PCMKR PERM SRG PROC LF SAN32PC573

## (undated) DEVICE — INTRO CATH 12CM 8.5FR FST-CATH

## (undated) RX ORDER — HEPARIN SODIUM 1000 [USP'U]/ML
INJECTION, SOLUTION INTRAVENOUS; SUBCUTANEOUS
Status: DISPENSED
Start: 2019-02-13

## (undated) RX ORDER — BUPIVACAINE HYDROCHLORIDE 2.5 MG/ML
INJECTION, SOLUTION EPIDURAL; INFILTRATION; INTRACAUDAL
Status: DISPENSED
Start: 2019-02-13

## (undated) RX ORDER — LIDOCAINE HYDROCHLORIDE 10 MG/ML
INJECTION, SOLUTION EPIDURAL; INFILTRATION; INTRACAUDAL; PERINEURAL
Status: DISPENSED
Start: 2019-02-13

## (undated) RX ORDER — CEFAZOLIN SODIUM 2 G/100ML
INJECTION, SOLUTION INTRAVENOUS
Status: DISPENSED
Start: 2019-02-13

## (undated) RX ORDER — FENTANYL CITRATE 50 UG/ML
INJECTION, SOLUTION INTRAMUSCULAR; INTRAVENOUS
Status: DISPENSED
Start: 2019-02-13